# Patient Record
Sex: FEMALE | Race: WHITE | Employment: OTHER | URBAN - METROPOLITAN AREA
[De-identification: names, ages, dates, MRNs, and addresses within clinical notes are randomized per-mention and may not be internally consistent; named-entity substitution may affect disease eponyms.]

---

## 2017-01-16 ENCOUNTER — HOSPITAL ENCOUNTER (OUTPATIENT)
Dept: GENERAL RADIOLOGY | Facility: MEDICAL CENTER | Age: 57
Discharge: HOME | End: 2017-01-16
Attending: ORTHOPAEDIC SURGERY | Admitting: ORTHOPAEDIC SURGERY

## 2017-01-16 ENCOUNTER — OFFICE VISIT (OUTPATIENT)
Dept: ORTHOPEDIC SURGERY | Age: 57
End: 2017-01-16

## 2017-01-16 VITALS
BODY MASS INDEX: 25.83 KG/M2 | HEIGHT: 65 IN | SYSTOLIC BLOOD PRESSURE: 123 MMHG | DIASTOLIC BLOOD PRESSURE: 79 MMHG | WEIGHT: 155 LBS | HEART RATE: 76 BPM

## 2017-01-16 DIAGNOSIS — G89.29 CHRONIC PAIN OF RIGHT KNEE: ICD-10-CM

## 2017-01-16 DIAGNOSIS — M17.12 PRIMARY OSTEOARTHRITIS OF LEFT KNEE: ICD-10-CM

## 2017-01-16 DIAGNOSIS — G89.29 CHRONIC PAIN OF LEFT KNEE: ICD-10-CM

## 2017-01-16 DIAGNOSIS — M25.562 CHRONIC PAIN OF LEFT KNEE: ICD-10-CM

## 2017-01-16 DIAGNOSIS — M25.561 CHRONIC PAIN OF RIGHT KNEE: ICD-10-CM

## 2017-01-16 DIAGNOSIS — M25.562 CHRONIC PAIN OF LEFT KNEE: Primary | ICD-10-CM

## 2017-01-16 DIAGNOSIS — M17.11 PRIMARY OSTEOARTHRITIS OF RIGHT KNEE: ICD-10-CM

## 2017-01-16 DIAGNOSIS — G89.29 CHRONIC PAIN OF LEFT KNEE: Primary | ICD-10-CM

## 2017-01-16 DIAGNOSIS — Z98.890 S/P RIGHT KNEE ARTHROSCOPY: ICD-10-CM

## 2017-01-16 DIAGNOSIS — M22.41 CHONDROMALACIA PATELLAE OF RIGHT KNEE: ICD-10-CM

## 2017-01-16 DIAGNOSIS — M22.42 CHONDROMALACIA PATELLAE OF LEFT KNEE: ICD-10-CM

## 2017-01-16 PROCEDURE — 20610 DRAIN/INJ JOINT/BURSA W/O US: CPT | Performed by: PHYSICIAN ASSISTANT

## 2017-01-16 PROCEDURE — 99214 OFFICE O/P EST MOD 30 MIN: CPT | Performed by: PHYSICIAN ASSISTANT

## 2017-01-16 PROCEDURE — 73562 X-RAY EXAM OF KNEE 3: CPT | Performed by: PHYSICIAN ASSISTANT

## 2017-01-16 RX ORDER — PROPRANOLOL HYDROCHLORIDE 120 MG/1
120 CAPSULE, EXTENDED RELEASE ORAL NIGHTLY
COMMUNITY
End: 2019-04-12

## 2017-02-06 ENCOUNTER — OFFICE VISIT (OUTPATIENT)
Dept: ENDOCRINOLOGY | Age: 57
End: 2017-02-06

## 2017-02-06 VITALS
HEIGHT: 60 IN | HEART RATE: 75 BPM | RESPIRATION RATE: 14 BRPM | SYSTOLIC BLOOD PRESSURE: 113 MMHG | DIASTOLIC BLOOD PRESSURE: 81 MMHG | BODY MASS INDEX: 28.86 KG/M2 | WEIGHT: 147 LBS | OXYGEN SATURATION: 93 %

## 2017-02-06 DIAGNOSIS — E03.9 HYPOTHYROIDISM, UNSPECIFIED TYPE: Primary | ICD-10-CM

## 2017-02-06 PROCEDURE — 99213 OFFICE O/P EST LOW 20 MIN: CPT | Performed by: INTERNAL MEDICINE

## 2017-02-07 ENCOUNTER — HOSPITAL ENCOUNTER (OUTPATIENT)
Dept: GENERAL RADIOLOGY | Age: 57
Discharge: HOME | End: 2017-02-07
Attending: INTERNAL MEDICINE | Admitting: INTERNAL MEDICINE

## 2017-02-07 DIAGNOSIS — E03.9 HYPOTHYROIDISM, UNSPECIFIED TYPE: ICD-10-CM

## 2017-02-07 LAB
A/G RATIO: 1.8 (ref 1.1–2.2)
ALBUMIN SERPL-MCNC: 4.4 G/DL (ref 3.4–5)
ALP BLD-CCNC: 65 U/L (ref 40–129)
ALT SERPL-CCNC: 11 U/L (ref 10–40)
ANION GAP SERPL CALCULATED.3IONS-SCNC: 15 MMOL/L (ref 3–16)
AST SERPL-CCNC: 16 U/L (ref 15–37)
BILIRUB SERPL-MCNC: 0.3 MG/DL (ref 0–1)
BUN BLDV-MCNC: 16 MG/DL (ref 7–20)
CALCIUM SERPL-MCNC: 9.2 MG/DL (ref 8.3–10.6)
CHLORIDE BLD-SCNC: 100 MMOL/L (ref 99–110)
CO2: 22 MMOL/L (ref 21–32)
CREAT SERPL-MCNC: 1 MG/DL (ref 0.6–1.1)
GFR AFRICAN AMERICAN: >60
GFR NON-AFRICAN AMERICAN: 57
GLOBULIN: 2.4 G/DL
GLUCOSE BLD-MCNC: 88 MG/DL (ref 70–99)
HCT VFR BLD CALC: 40.8 % (ref 36–48)
HEMOGLOBIN: 13.6 G/DL (ref 12–16)
MCH RBC QN AUTO: 30 PG (ref 26–34)
MCHC RBC AUTO-ENTMCNC: 33.4 G/DL (ref 31–36)
MCV RBC AUTO: 90.1 FL (ref 80–100)
PDW BLD-RTO: 15.5 % (ref 12.4–15.4)
PLATELET # BLD: 183 K/UL (ref 135–450)
PMV BLD AUTO: 7.1 FL (ref 5–10.5)
POTASSIUM SERPL-SCNC: 4.1 MMOL/L (ref 3.5–5.1)
RBC # BLD: 4.53 M/UL (ref 4–5.2)
SODIUM BLD-SCNC: 137 MMOL/L (ref 136–145)
TOTAL PROTEIN: 6.8 G/DL (ref 6.4–8.2)
TSH SERPL DL<=0.05 MIU/L-ACNC: 5.28 UIU/ML (ref 0.27–4.2)
WBC # BLD: 6.5 K/UL (ref 4–11)

## 2017-02-07 RX ORDER — LEVOTHYROXINE SODIUM 137 UG/1
137 TABLET ORAL DAILY
Qty: 90 TABLET | Refills: 3 | Status: SHIPPED | OUTPATIENT
Start: 2017-02-07 | End: 2018-02-15 | Stop reason: SDUPTHER

## 2017-03-17 ENCOUNTER — OFFICE VISIT (OUTPATIENT)
Dept: ORTHOPEDIC SURGERY | Age: 57
End: 2017-03-17

## 2017-03-17 VITALS
SYSTOLIC BLOOD PRESSURE: 125 MMHG | BODY MASS INDEX: 25.83 KG/M2 | DIASTOLIC BLOOD PRESSURE: 78 MMHG | HEIGHT: 65 IN | WEIGHT: 155 LBS | HEART RATE: 82 BPM

## 2017-03-17 DIAGNOSIS — Z98.890 S/P RIGHT KNEE ARTHROSCOPY: ICD-10-CM

## 2017-03-17 DIAGNOSIS — M22.42 CHONDROMALACIA PATELLAE OF LEFT KNEE: ICD-10-CM

## 2017-03-17 DIAGNOSIS — M25.562 CHRONIC PAIN OF LEFT KNEE: Primary | ICD-10-CM

## 2017-03-17 DIAGNOSIS — M17.12 PRIMARY OSTEOARTHRITIS OF LEFT KNEE: ICD-10-CM

## 2017-03-17 DIAGNOSIS — M25.561 CHRONIC PAIN OF RIGHT KNEE: ICD-10-CM

## 2017-03-17 DIAGNOSIS — M22.41 CHONDROMALACIA PATELLAE OF RIGHT KNEE: ICD-10-CM

## 2017-03-17 DIAGNOSIS — G89.29 CHRONIC PAIN OF LEFT KNEE: Primary | ICD-10-CM

## 2017-03-17 DIAGNOSIS — G89.29 CHRONIC PAIN OF RIGHT KNEE: ICD-10-CM

## 2017-03-17 DIAGNOSIS — M17.11 PRIMARY OSTEOARTHRITIS OF RIGHT KNEE: ICD-10-CM

## 2017-03-17 PROCEDURE — 20610 DRAIN/INJ JOINT/BURSA W/O US: CPT | Performed by: PHYSICIAN ASSISTANT

## 2017-03-17 PROCEDURE — 99213 OFFICE O/P EST LOW 20 MIN: CPT | Performed by: PHYSICIAN ASSISTANT

## 2017-05-05 ENCOUNTER — OFFICE VISIT (OUTPATIENT)
Dept: ORTHOPEDIC SURGERY | Age: 57
End: 2017-05-05

## 2017-05-05 VITALS
SYSTOLIC BLOOD PRESSURE: 131 MMHG | BODY MASS INDEX: 25.83 KG/M2 | DIASTOLIC BLOOD PRESSURE: 77 MMHG | WEIGHT: 155 LBS | HEIGHT: 65 IN | HEART RATE: 68 BPM

## 2017-05-05 DIAGNOSIS — M22.42 CHONDROMALACIA PATELLAE OF LEFT KNEE: Primary | ICD-10-CM

## 2017-05-05 DIAGNOSIS — M17.12 PRIMARY OSTEOARTHRITIS OF LEFT KNEE: ICD-10-CM

## 2017-05-05 DIAGNOSIS — G89.29 CHRONIC PAIN OF RIGHT KNEE: ICD-10-CM

## 2017-05-05 DIAGNOSIS — G89.29 CHRONIC PAIN OF LEFT KNEE: ICD-10-CM

## 2017-05-05 DIAGNOSIS — M25.562 CHRONIC PAIN OF LEFT KNEE: ICD-10-CM

## 2017-05-05 DIAGNOSIS — M25.561 CHRONIC PAIN OF RIGHT KNEE: ICD-10-CM

## 2017-05-05 DIAGNOSIS — Z98.890 S/P RIGHT KNEE ARTHROSCOPY: ICD-10-CM

## 2017-05-05 DIAGNOSIS — M17.11 PRIMARY OSTEOARTHRITIS OF RIGHT KNEE: ICD-10-CM

## 2017-05-05 DIAGNOSIS — M22.41 CHONDROMALACIA PATELLAE OF RIGHT KNEE: ICD-10-CM

## 2017-05-05 PROCEDURE — 20610 DRAIN/INJ JOINT/BURSA W/O US: CPT | Performed by: PHYSICIAN ASSISTANT

## 2017-05-05 PROCEDURE — 99213 OFFICE O/P EST LOW 20 MIN: CPT | Performed by: PHYSICIAN ASSISTANT

## 2017-06-20 ENCOUNTER — TELEPHONE (OUTPATIENT)
Dept: ENDOCRINOLOGY | Age: 57
End: 2017-06-20

## 2017-06-20 RX ORDER — LEVOTHYROXINE SODIUM 0.12 MG/1
TABLET ORAL
Qty: 26 TABLET | Refills: 5 | Status: SHIPPED | OUTPATIENT
Start: 2017-06-20 | End: 2017-06-23

## 2017-06-29 ENCOUNTER — HOSPITAL ENCOUNTER (OUTPATIENT)
Dept: SURGERY | Age: 57
Discharge: HOME | End: 2017-06-29
Attending: INTERNAL MEDICINE | Admitting: INTERNAL MEDICINE

## 2017-06-29 VITALS
RESPIRATION RATE: 14 BRPM | WEIGHT: 162 LBS | SYSTOLIC BLOOD PRESSURE: 117 MMHG | HEART RATE: 73 BPM | TEMPERATURE: 98 F | HEIGHT: 60 IN | OXYGEN SATURATION: 99 % | BODY MASS INDEX: 31.8 KG/M2 | DIASTOLIC BLOOD PRESSURE: 67 MMHG

## 2017-06-29 DIAGNOSIS — R19.7 DIARRHEA: ICD-10-CM

## 2017-06-29 RX ORDER — SODIUM CHLORIDE, SODIUM LACTATE, POTASSIUM CHLORIDE, CALCIUM CHLORIDE 600; 310; 30; 20 MG/100ML; MG/100ML; MG/100ML; MG/100ML
INJECTION, SOLUTION INTRAVENOUS CONTINUOUS
Status: DISCONTINUED | OUTPATIENT
Start: 2017-06-29 | End: 2017-06-30 | Stop reason: HOSPADM

## 2017-06-29 RX ORDER — SODIUM CHLORIDE 0.9 % (FLUSH) 0.9 %
10 SYRINGE (ML) INJECTION EVERY 12 HOURS SCHEDULED
Status: DISCONTINUED | OUTPATIENT
Start: 2017-06-29 | End: 2017-06-30 | Stop reason: HOSPADM

## 2017-06-29 RX ORDER — MEPERIDINE HYDROCHLORIDE 50 MG/ML
12.5 INJECTION INTRAMUSCULAR; INTRAVENOUS; SUBCUTANEOUS EVERY 5 MIN PRN
Status: DISCONTINUED | OUTPATIENT
Start: 2017-06-29 | End: 2017-06-30 | Stop reason: HOSPADM

## 2017-06-29 RX ORDER — LABETALOL HYDROCHLORIDE 5 MG/ML
5 INJECTION, SOLUTION INTRAVENOUS EVERY 10 MIN PRN
Status: DISCONTINUED | OUTPATIENT
Start: 2017-06-29 | End: 2017-06-30 | Stop reason: HOSPADM

## 2017-06-29 RX ORDER — ONDANSETRON 2 MG/ML
4 INJECTION INTRAMUSCULAR; INTRAVENOUS PRN
Status: DISCONTINUED | OUTPATIENT
Start: 2017-06-29 | End: 2017-06-30 | Stop reason: HOSPADM

## 2017-06-29 RX ORDER — PROMETHAZINE HYDROCHLORIDE 25 MG/ML
6.25 INJECTION, SOLUTION INTRAMUSCULAR; INTRAVENOUS
Status: DISCONTINUED | OUTPATIENT
Start: 2017-06-29 | End: 2017-06-30 | Stop reason: HOSPADM

## 2017-06-29 RX ORDER — DIPHENHYDRAMINE HYDROCHLORIDE 50 MG/ML
12.5 INJECTION INTRAMUSCULAR; INTRAVENOUS
Status: ACTIVE | OUTPATIENT
Start: 2017-06-29 | End: 2017-06-29

## 2017-06-29 RX ORDER — MORPHINE SULFATE 2 MG/ML
2 INJECTION, SOLUTION INTRAMUSCULAR; INTRAVENOUS EVERY 5 MIN PRN
Status: DISCONTINUED | OUTPATIENT
Start: 2017-06-29 | End: 2017-06-30 | Stop reason: HOSPADM

## 2017-06-29 RX ORDER — LIDOCAINE HYDROCHLORIDE 10 MG/ML
0.1 INJECTION, SOLUTION INFILTRATION; PERINEURAL ONCE
Status: DISCONTINUED | OUTPATIENT
Start: 2017-06-29 | End: 2017-06-30 | Stop reason: HOSPADM

## 2017-06-29 RX ORDER — SODIUM CHLORIDE 0.9 % (FLUSH) 0.9 %
10 SYRINGE (ML) INJECTION PRN
Status: DISCONTINUED | OUTPATIENT
Start: 2017-06-29 | End: 2017-06-30 | Stop reason: HOSPADM

## 2017-06-29 RX ORDER — HYDRALAZINE HYDROCHLORIDE 20 MG/ML
5 INJECTION INTRAMUSCULAR; INTRAVENOUS EVERY 10 MIN PRN
Status: DISCONTINUED | OUTPATIENT
Start: 2017-06-29 | End: 2017-06-30 | Stop reason: HOSPADM

## 2017-06-29 RX ORDER — LIDOCAINE HYDROCHLORIDE 10 MG/ML
0.3 INJECTION, SOLUTION EPIDURAL; INFILTRATION; INTRACAUDAL; PERINEURAL
Status: ACTIVE | OUTPATIENT
Start: 2017-06-29 | End: 2017-06-29

## 2017-06-29 RX ORDER — MORPHINE SULFATE 2 MG/ML
1 INJECTION, SOLUTION INTRAMUSCULAR; INTRAVENOUS EVERY 5 MIN PRN
Status: DISCONTINUED | OUTPATIENT
Start: 2017-06-29 | End: 2017-06-30 | Stop reason: HOSPADM

## 2017-06-29 RX ORDER — OXYCODONE HYDROCHLORIDE AND ACETAMINOPHEN 5; 325 MG/1; MG/1
1 TABLET ORAL PRN
Status: ACTIVE | OUTPATIENT
Start: 2017-06-29 | End: 2017-06-29

## 2017-06-29 RX ORDER — OXYCODONE HYDROCHLORIDE AND ACETAMINOPHEN 5; 325 MG/1; MG/1
2 TABLET ORAL PRN
Status: ACTIVE | OUTPATIENT
Start: 2017-06-29 | End: 2017-06-29

## 2017-06-29 RX ADMIN — SODIUM CHLORIDE, SODIUM LACTATE, POTASSIUM CHLORIDE, CALCIUM CHLORIDE: 600; 310; 30; 20 INJECTION, SOLUTION INTRAVENOUS at 14:06

## 2017-06-29 ASSESSMENT — PAIN DESCRIPTION - LOCATION: LOCATION: ABDOMEN

## 2017-06-29 ASSESSMENT — PAIN SCALES - GENERAL
PAINLEVEL_OUTOF10: 0

## 2017-06-29 ASSESSMENT — PAIN - FUNCTIONAL ASSESSMENT: PAIN_FUNCTIONAL_ASSESSMENT: 0-10

## 2017-07-14 ENCOUNTER — OFFICE VISIT (OUTPATIENT)
Dept: ORTHOPEDIC SURGERY | Age: 57
End: 2017-07-14

## 2017-07-14 VITALS
HEART RATE: 70 BPM | DIASTOLIC BLOOD PRESSURE: 88 MMHG | HEIGHT: 60 IN | BODY MASS INDEX: 30.63 KG/M2 | WEIGHT: 156 LBS | SYSTOLIC BLOOD PRESSURE: 136 MMHG

## 2017-07-14 DIAGNOSIS — G89.29 CHRONIC PAIN OF RIGHT KNEE: ICD-10-CM

## 2017-07-14 DIAGNOSIS — M25.562 CHRONIC PAIN OF LEFT KNEE: Primary | ICD-10-CM

## 2017-07-14 DIAGNOSIS — M25.561 CHRONIC PAIN OF RIGHT KNEE: ICD-10-CM

## 2017-07-14 DIAGNOSIS — Z98.890 S/P RIGHT KNEE ARTHROSCOPY: ICD-10-CM

## 2017-07-14 DIAGNOSIS — M17.12 PRIMARY OSTEOARTHRITIS OF LEFT KNEE: ICD-10-CM

## 2017-07-14 DIAGNOSIS — M22.42 CHONDROMALACIA PATELLAE OF LEFT KNEE: ICD-10-CM

## 2017-07-14 DIAGNOSIS — M22.41 CHONDROMALACIA PATELLAE OF RIGHT KNEE: ICD-10-CM

## 2017-07-14 DIAGNOSIS — G89.29 CHRONIC PAIN OF LEFT KNEE: Primary | ICD-10-CM

## 2017-07-14 DIAGNOSIS — M17.11 PRIMARY OSTEOARTHRITIS OF RIGHT KNEE: ICD-10-CM

## 2017-07-14 PROCEDURE — 20610 DRAIN/INJ JOINT/BURSA W/O US: CPT | Performed by: PHYSICIAN ASSISTANT

## 2017-08-09 ENCOUNTER — OFFICE VISIT (OUTPATIENT)
Dept: ENDOCRINOLOGY | Age: 57
End: 2017-08-09

## 2017-08-09 ENCOUNTER — HOSPITAL ENCOUNTER (OUTPATIENT)
Dept: GENERAL RADIOLOGY | Age: 57
Discharge: HOME | End: 2017-08-09
Attending: INTERNAL MEDICINE | Admitting: INTERNAL MEDICINE

## 2017-08-09 VITALS
RESPIRATION RATE: 14 BRPM | HEIGHT: 60 IN | SYSTOLIC BLOOD PRESSURE: 121 MMHG | HEART RATE: 69 BPM | WEIGHT: 157 LBS | BODY MASS INDEX: 30.82 KG/M2 | OXYGEN SATURATION: 97 % | DIASTOLIC BLOOD PRESSURE: 83 MMHG

## 2017-08-09 DIAGNOSIS — E03.9 ACQUIRED HYPOTHYROIDISM: Primary | ICD-10-CM

## 2017-08-09 LAB
T4 FREE: 1.5 NG/DL (ref 0.9–1.8)
TSH SERPL DL<=0.05 MIU/L-ACNC: 0.3 UIU/ML (ref 0.27–4.2)

## 2017-08-09 PROCEDURE — 99214 OFFICE O/P EST MOD 30 MIN: CPT | Performed by: INTERNAL MEDICINE

## 2017-08-09 RX ORDER — LEVOTHYROXINE SODIUM 0.12 MG/1
125 TABLET ORAL DAILY
COMMUNITY
End: 2018-01-16

## 2017-08-25 ENCOUNTER — OFFICE VISIT (OUTPATIENT)
Dept: ORTHOPEDIC SURGERY | Age: 57
End: 2017-08-25

## 2017-08-25 VITALS
DIASTOLIC BLOOD PRESSURE: 78 MMHG | SYSTOLIC BLOOD PRESSURE: 113 MMHG | WEIGHT: 156 LBS | HEART RATE: 74 BPM | BODY MASS INDEX: 30.63 KG/M2 | HEIGHT: 60 IN

## 2017-08-25 DIAGNOSIS — M17.11 PRIMARY OSTEOARTHRITIS OF RIGHT KNEE: ICD-10-CM

## 2017-08-25 DIAGNOSIS — G89.29 CHRONIC PAIN OF LEFT KNEE: Primary | ICD-10-CM

## 2017-08-25 DIAGNOSIS — M25.561 CHRONIC PAIN OF RIGHT KNEE: ICD-10-CM

## 2017-08-25 DIAGNOSIS — M22.41 CHONDROMALACIA PATELLAE OF RIGHT KNEE: ICD-10-CM

## 2017-08-25 DIAGNOSIS — M22.42 CHONDROMALACIA PATELLAE OF LEFT KNEE: ICD-10-CM

## 2017-08-25 DIAGNOSIS — M25.562 CHRONIC PAIN OF LEFT KNEE: Primary | ICD-10-CM

## 2017-08-25 DIAGNOSIS — M17.12 PRIMARY OSTEOARTHRITIS OF LEFT KNEE: ICD-10-CM

## 2017-08-25 DIAGNOSIS — G89.29 CHRONIC PAIN OF RIGHT KNEE: ICD-10-CM

## 2017-08-25 DIAGNOSIS — Z98.890 S/P RIGHT KNEE ARTHROSCOPY: ICD-10-CM

## 2017-08-25 PROCEDURE — 20610 DRAIN/INJ JOINT/BURSA W/O US: CPT | Performed by: PHYSICIAN ASSISTANT

## 2017-08-25 PROCEDURE — 99999 PR OFFICE/OUTPT VISIT,PROCEDURE ONLY: CPT | Performed by: PHYSICIAN ASSISTANT

## 2017-08-25 RX ORDER — BUPROPION HYDROCHLORIDE 150 MG/1
150 TABLET ORAL EVERY MORNING
COMMUNITY
End: 2018-01-16

## 2017-10-27 ENCOUNTER — OFFICE VISIT (OUTPATIENT)
Dept: ORTHOPEDIC SURGERY | Age: 57
End: 2017-10-27

## 2017-10-27 VITALS
DIASTOLIC BLOOD PRESSURE: 72 MMHG | BODY MASS INDEX: 30.63 KG/M2 | SYSTOLIC BLOOD PRESSURE: 113 MMHG | HEIGHT: 60 IN | WEIGHT: 156 LBS | HEART RATE: 75 BPM

## 2017-10-27 DIAGNOSIS — M17.11 PRIMARY OSTEOARTHRITIS OF RIGHT KNEE: ICD-10-CM

## 2017-10-27 DIAGNOSIS — M22.42 CHONDROMALACIA PATELLAE OF LEFT KNEE: ICD-10-CM

## 2017-10-27 DIAGNOSIS — M25.561 CHRONIC PAIN OF RIGHT KNEE: ICD-10-CM

## 2017-10-27 DIAGNOSIS — G89.29 CHRONIC PAIN OF RIGHT KNEE: ICD-10-CM

## 2017-10-27 DIAGNOSIS — M17.12 PRIMARY OSTEOARTHRITIS OF LEFT KNEE: ICD-10-CM

## 2017-10-27 DIAGNOSIS — G89.29 CHRONIC PAIN OF LEFT KNEE: Primary | ICD-10-CM

## 2017-10-27 DIAGNOSIS — M25.562 CHRONIC PAIN OF LEFT KNEE: Primary | ICD-10-CM

## 2017-10-27 DIAGNOSIS — M22.41 CHONDROMALACIA PATELLAE OF RIGHT KNEE: ICD-10-CM

## 2017-10-27 DIAGNOSIS — Z98.890 S/P RIGHT KNEE ARTHROSCOPY: ICD-10-CM

## 2017-10-27 PROCEDURE — 20610 DRAIN/INJ JOINT/BURSA W/O US: CPT | Performed by: PHYSICIAN ASSISTANT

## 2017-10-27 NOTE — PATIENT INSTRUCTIONS
Alice Ross was instructed to apply ice to the injection area for 15 - 20 minutes several times a day to decrease pain and and swelling. Ice (\"ICE IS YOUR FRIEND\": try using a bag of frozen peas or corn) for 15  20 minutes 3 x day. Limit activities today. You may resume normal activities tomorrow if you have no pain. For severe pain:  If after hours, she is to go to Emergency Room. During office hours she must come in to the office. Alice Ross was instructed to call the office if there are any questions or concerns related to her condition. I have asked her to schedule a follow-up appointment for 6-8 weeks from now for re-evaluation and possible repeat injection. She is specifically instructed to contact the office between now & her scheduled appointment if she has concerns related to her condition. She is welcome to call for an appointment sooner if she has any additional concerns or questions. General Medication Instructions:  Any prescriptions must be used as directed. If you have any concerns, questions or require refills, please contact our office. If you experience any adverse reactions, stop the medication and call our office immediately. If you designate a preferred pharmacy, appropriate prescriptions will be sent to your preferred pharmacy for pickup to be use as directed. Patient Driving Instructions:  No driving if you are taking narcotic pain medications or muscle relaxers. PATIENT REMINDER:   Carry a list of your medications and allergies with you at all times. Call your pharmacy and our office at least 1 week in advance to refill prescriptions. Narcotic medications will not be refilled after hours or on weekends. ATTENTION    As of October 2, 2014, the [facility] ([facility]) has mandated that ALL PRESCRIPTION PAIN MEDICINE cannot be called into your pharmacy.     This includes:    Oxycodone (Percocet)  Hydrocodone (Vicodin, provided the following link that may be helpful for you if you would like more information on your Orthopaedic condition:    www. Orthoinfo. org         If you or someone you know struggles with weight issues and joint pain, please check out this important documentary:      \"Start Moving Start Living\" =  Http://startmovingstartliving.com       ([employer] video SMSL FULL SD)

## 2017-10-27 NOTE — LETTER
FAXED/SENT TO Dr Jairo Dickson MD  10/27/17, 12:06 PM        Kenan Bucio      Dear Dr Jairo Dickson MD,    Thank you very much for your referral of Ms. Vesta Salazar to me for evaluation and treatment. Attached below is my report and recommendations from Susan Espinosa most recent office visit. I appreciate your confidence in me and thank you for allowing me the opportunity to care for your patients. If I can be of any further assistance to you on this or any other patient, please do not hesitate to contact me. Sincerely,    Lalo Monet PA-C  Electronically signed by Lalo Monet PA-C on 10/27/2017 at 12:06 PM     Contact Information:  Olivia Roach,  &  Clinical Staff  [phone], [phone] [facility] [place]  Orthopaedic Surgery & Sports Medicine       [address] [facility] OFFICE  [address] [place] [address], 2nd Floor  [address] [facility] [place], [address] [place] [address] 30    [phone] Option 3   [phone] Option [phone] (fax)    [phone] (fax)       PATIENT: Vesta Salazar    61 y.o.  female  [facility]: 1960   MRN:  [id number]       Date of current encounter: 10/27/2017  This encounter is evaluated as a:        New Patient Visit     Established Patient Visit    Post-Op Visit      Consult: requested by          Worker's Comp       Patient's PCP is Dr. Jairo Dickson MD    Subjective:     Chief Complaint   Patient presents with    Knee Pain     ongoing evaluation and treatment of bilateral knees        HPI:  Vesta Salazar is a 61y.o. year old,  female complaining of bilateral knee pain. She states that since her last visit her knees have been relatively doing well. She has found a new walking partner and has been walking more.  They did start walking hills which has increased her knee pain. She plans on staying away from walking hills future. She would like an injection into both of her knees today. She rates her pain as a 0 out of 10 at rest and a 5 out of 10 with activity. She describes her pain as sharp in grinding. It occurs for minutes and is intermittent. Kneeling, squatting and prolonged walking aggravate her pain. She does feel that her knees are limiting her behavior some. Rest, ice and Tylenol help to relieve her pain. She does not have night pain. She does not have morning.     PAIN ASSESSMENT:   Pain Assessment  Location of Pain: Knee  Location Modifiers: Right, Left  Severity of Pain: 5  Quality of Pain: Sharp, Grinding  Duration of Pain: A few minutes  Frequency of Pain: Intermittent  Date Pain First Started:  (ongoing several years )  Aggravating Factors: Kneeling, Squatting, Walking  Limiting Behavior: Some  Relieving Factors: Rest, Ice, Other (Comment) (tylenol)  Result of Injury: No  Work-Related Injury: No  Are there other pain locations you wish to document?: No    Patient Active Problem List   Diagnosis    Headache    Hyperlipidemia    Cerebral artery occlusion with cerebral infarction ([address]    GERD (gastroesophageal reflux disease)    Hypothyroidism    Anxiety    Depression    H/O suicide attempt    Cerebral infarction ([address]    Disc disorder of cervical region    Fibromyalgia    Generalized osteoarthritis    Irritable bowel syndrome    Impingement syndrome of shoulder region    Patent foramen ovale    Spondyloarthropathy (HCC)    Menopause    History of total vaginal hysterectomy    Vaginal atrophy    S/P right knee arthroscopy, in 2012 by Dr. Markos Dueñas Primary osteoarthritis of left knee    Chondromalacia patellae of left knee    Decreased libido    Chondromalacia patellae of right knee    Primary osteoarthritis of right knee    Tear of medial meniscus of right knee    Knee effusion    Right knee pain    Left knee pain  Chronic pain of right knee    Chronic pain of left knee    Dyspareunia in female     Past Medical History:   Diagnosis Date    Anticoagulant long-term use     Anxiety     Arthritis     Bipolar disorder (HCC)     Cerebrovascular disease 2/2014    stroke    Depression     Fibromyalgia     Generalized osteoarthritis 3/6/2015    GERD (gastroesophageal reflux disease)     H/O suicide attempt     Headache(784.0)     Hyperlipidemia     Hypertension     Hypothyroidism     Irritable bowel syndrome     Obesity     Patent foramen ovale 2/27/2014    Unspecified cerebral artery occlusion with cerebral infarction      Past Surgical History:   Procedure Laterality Date    CHOLECYSTECTOMY  1990's    laparoscopic    COLONOSCOPY  2014    COLONOSCOPY  06/29/2017    random biopsies    HYSTERECTOMY  1980's    total vaginal hysterectomy, retains both ovaries, menorrhagia, fibroid tumors    KNEE CARTILAGE SURGERY Right     THYROIDECTOMY         Allergies:  Latex; Codeine; Ketorolac; Ketorolac tromethamine; Kiwi extract; Nsaids; Penicillins; and Tolmetin    Medications:  Prior to Visit Medications    Medication Sig Taking?  Authorizing Provider   buPROPion (WELLBUTRIN XL) 150 MG extended release tablet Take 150 mg by mouth every morning  Historical Provider, MD   levothyroxine (SYNTHROID) 125 MCG tablet Take 125 mcg by mouth Daily Take one tablet on Saturday and Sunday  Historical Provider, MD   lamoTRIgine (LAMICTAL) 150 MG tablet Take 150 mg by mouth daily  Historical Provider, MD   levothyroxine (SYNTHROID) 137 MCG tablet Take 1 tablet by mouth Daily  Patient taking differently: Take 137 mcg by mouth Daily Take one tablet Monday -Friday  Anupam Ruiz MD   propranolol (INDERAL LA) 120 MG extended release capsule Take 120 mg by mouth  Historical Provider, MD   DULoxetine (CYMBALTA) 60 MG capsule Take 2 capsules by mouth daily  Selma Basilio MD Cholecalciferol (VITAMIN D) 2000 UNITS CAPS capsule Take 1 capsule by mouth daily  Historical Provider, MD   clonazePAM (KLONOPIN) 0.5 MG tablet Take 1 tablet by mouth 2 times daily as needed  Haseeb Meza MD   omeprazole (PRILOSEC) 40 MG capsule TAKE 1 CAPSULE BY MOUTH DAILY  Haseeb Meza MD   topiramate (TOPAMAX) 100 MG tablet TAKE 1 TABLET BY MOUTH TWICE DAILY  Haseeb Meza MD   clopidogrel (PLAVIX) 75 MG tablet   Historical Provider, MD   fluticasone (FLONASE) 50 MCG/ACT nasal spray 2 sprays up each nostril daily. Haseeb Meza MD     Social History     Social History    Marital status:      Spouse name: N/A    Number of children: N/A    Years of education: N/A     Occupational History    retired      Social History Main Topics    Smoking status: Never Smoker    Smokeless tobacco: Never Used    Alcohol use 1.2 oz/week     2 Glasses of wine per week      Comment: socially    Drug use: No    Sexual activity: Not Currently     Birth control/ protection: Post-menopausal     Other Topics Concern    Not on file     Social History Narrative    No narrative on file     Family History   Problem Relation Age of Onset    Clotting Disorder Mother     Other Mother      lung disease    Cancer Father      colon, brain    Arthritis Father      polio    Heart Disease Father     Heart Disease Brother     Obesity Brother     Substance Abuse Brother        Review of Systems (ROS):    Performed. Rogerio Whipple's review of systems has been performed by intake and observation. The most recent ROS was scanned into the media tab of the chart on 7/14/2017. She has been instructed to contact her primary care physician regarding ROS issues if not already being addressed at this time. There are no recent changes.      Objective:   Physical Exam  Vital Signs:  /72   Pulse 75   Ht 5' (1.524 m)   Wt 156 lb (70.8 kg)   BMI 30.47 kg/m²      Constitution: Generally, Krystian Regalado is  alert,  appears stated age, and  in no distress. Her general body habitus is  Cachectic   Thin   Normal   Obese   Morbidly Obese    Head:  Normocephalic  Eyes:  Extra-occular muscles intact     Wears glasses  Left Ear:  External Ear normal   Right Ear:  External Ear normal   Nose:  Normal  Mouth:  Oral mucosa moist   No perioral lesions    Pulmonary:  Respirations unlabored and regular  Skin:  Warm  Well perfused     Psychiatric:    Good judgement and insight   Oriented to  person,  place, and  time. Mood appropriate for circumstances. Gait:   Gait is  Normal   Impaired slight limp  Assistive Device:  None   Knee Brace   Cane   Crutches    Walker    Wheelchair   Other     ORTHOPAEDIC KNEE EXAM: BILATERAL   Inspection:   Skin intact without abrasion or lacerations. Ecchymosis:   none   mild   moderate   severe   Atrophy:   none   mild   moderate   severe   Effusion:  none   mild   moderate   severe   Scar / Surgical incision(s): A-Scope Portals   Open Surgical Incision(s)    Alignment:   Normal   Varus  Valgus    Range of Motion:   Normal Knee ROM          Deferred: acute injury/post-surgery/pain    Limited ROM: 0-90° with crepitation    Palpation:    No Tenderness   Tenderness: Anterior and medial joint line, left greater than right  mild   moderate   severe    Patellofemoral Crepitation:   none   mild   moderate   severe    Motor Function:    No gross motor weakness   Motor weakness:   mild   moderate   severe     Neurologic:   Sensation to light touch intact    Coordination / proprioception intact    Circulation:   The limb is warm and well perfused   Capillary refill is intact.    Edema   none   mild   moderate   severe   Venous stasis changes   none   mild   moderate   severe    Bilateral Hip Exam:   Negative logroll     Data Reviewed:      No imaging studies were obtained today.      XRays:  (3 views: AP, lateral and patella views) of her right knee taken 1/16/17 showed mild degenerative changes in the patellofemoral compartment, moderate degenerative changes in the medial compartment and mild degenerative changes in the lateral compartment. There is joint space narrowing and osteophyte formation patellofemoral and medial compartment. There is neutral alignment.       (3 views: AP, lateral and patella views) of her left knee taken 1/16/17 showed very mild degenerative changes in the patellofemoral compartment and mild degenerative changes in the medial compartment. There is joint space narrowing and osteophyte formation medial compartment. There is neurtral alignment. PROCEDURE NOTE: BILATERAL KNEE INJECTIONS  10/27/2017 at 11:59 AM   Procedure: Injections  Verbal consent was obtained. Risks and benefits were explained. Questions were encouraged and answered. Timeout Verification Completed including:    Correct patient: Fiordaliza Yousif was identified    Correct procedure    Correct site & side    Correct equipment and supplies    Staff member: Deja Dewey PA-C     Assistant: Willie George     Injection Site: Superolateral  bilaterally    The injection sites were prepped with Chlora-Prep using aseptic technique and bilateral knee intra-articular injections were performed with ethyl chloride for anesthetic. Depomedrol 2 ml (40 mg/ml = 80 mg total) was injected into each knee. A sterile adhesive dressing was applied to each injection site. Post procedure:  Fiordaliza Yousif tolerated the treatment well. Instructions to patient:  Appropriate post injections instructions were given to Fiordaliza Yousif. Assessment (Medical Decision Making):     Fiordaliza Yousif is a 61y.o. year old female with the following diagnosis:    1. Chronic pain of left knee  DE ARTHROCENTESIS ASPIR&/INJ MAJOR JT/BURSA W/O US    DE METHYLPREDNISOLONE 40 MG INJ   2.  Primary osteoarthritis of left knee  DE ARTHROCENTESIS ASPIR&/INJ MAJOR JT/BURSA W/O US    DE METHYLPREDNISOLONE 40 MG INJ 3. Chondromalacia patellae of left knee  [place] ARTHROCENTESIS ASPIR&/INJ MAJOR JT/BURSA W/O US    [place] METHYLPREDNISOLONE 40 MG INJ   4. Chronic pain of right knee  [place] ARTHROCENTESIS ASPIR&/INJ MAJOR JT/BURSA W/O US    [place] METHYLPREDNISOLONE 40 MG INJ   5. Primary osteoarthritis of right knee  [place] ARTHROCENTESIS ASPIR&/INJ MAJOR JT/BURSA W/O US    [place] METHYLPREDNISOLONE 40 MG INJ   6. Chondromalacia patellae of right knee  [place] ARTHROCENTESIS ASPIR&/INJ MAJOR JT/BURSA W/O US    [place] METHYLPREDNISOLONE 40 MG INJ   7. S/P right knee arthroscopy, in 2012 by Dr. Laura Lindsay         Her overall course:         Responding adequately to ongoing treatment      Worsening despite conservative treatment      Unchanged despite conservative treatment    Plan (Medical Decision Making):      I discussed the diagnosis and the treatment options with Fiordaliza Yousif today. In Summary:  1). The various treatment options were outlined and discussed with Fiordaliza Yousif including:       Conservative care options:      physical therapy, ice, NSAIDs, bracing, and activity modification        The indications for therapeutic injections Steroids and Hyluronic Acid/Synvisc/Euflexxa/Supartz    2). After considering the various options discussed, Fiordaliza Yousif elected to pursue a course of treatment that includes the following:       MEDICATIONS/REFILLS prescribed today are listed below. No refills today. Physical Therapy/HEP Activities as tolerated        Script: 2 x per week x 4 weeks     Ice to affected area: 15-20 minutes 4 x day     Injection into both of her knees today     Return to Clinic/Follow - Up:  Fiordaliza Yousif was asked to make a follow-up appointment:     6-8 weeks if needed    Fiordaliza Yousif was instructed to call the office if her symptoms worsen or if new symptoms appear prior to the next scheduled visit.  She is specifically instructed to contact the office between now & her scheduled appointment if she has concerns related to her condition or if she needs assistance in scheduling the above tests. She is welcome to call for an appointment sooner if she has any additional concerns or questions. Patient Education Materials Provided:    Patient Instructions     Hali Pak was instructed to apply ice to the injection area for 15 - 20 minutes several times a day to decrease pain and and swelling. Ice (\"ICE IS YOUR FRIEND\": try using a bag of frozen peas or corn) for 15  20 minutes 3 x day. Limit activities today. You may resume normal activities tomorrow if you have no pain. For severe pain:  If after hours, she is to go to Emergency Room. During office hours she must come in to the office. Hali Pak was instructed to call the office if there are any questions or concerns related to her condition. I have asked her to schedule a follow-up appointment for 6-8 weeks from now for re-evaluation and possible repeat injection. She is specifically instructed to contact the office between now & her scheduled appointment if she has concerns related to her condition. She is welcome to call for an appointment sooner if she has any additional concerns or questions. General Medication Instructions:  Any prescriptions must be used as directed. If you have any concerns, questions or require refills, please contact our office. If you experience any adverse reactions, stop the medication and call our office immediately. If you designate a preferred pharmacy, appropriate prescriptions will be sent to your preferred pharmacy for pickup to be use as directed. Patient Driving Instructions:  No driving if you are taking narcotic pain medications or muscle relaxers. PATIENT REMINDER:   Carry a list of your medications and allergies with you at all times.   Call your pharmacy and our office at least 1 week in advance to refill Dispose of any unused medications properly. Do not flush the medications. Look for appropriate waste collection programs in your community and drug take back events. DO NOT drive while taking narcotic pain medication or muscle relaxers. FOR MORE INFORMATION, CHECK OUT THIS RESOURCE    For your convenience, Dr. Juliane Tineo has provided the following link that may be helpful for you if you would like more information on your Orthopaedic condition:    www. Orthoinfo. org         If you or someone you know struggles with weight issues and joint pain, please check out this important documentary:      \"Start Moving Start Living\" =  Http://[email or web address]       (YOUTUBE video SMSL FULL SD)                No orders of the defined types were placed in this encounter. Refills/New Prescriptions:  No orders of the defined types were placed in this encounter. Today's prescription medications will be e-scribed (when appropriate) to the Patient's Preferred Pharmacy:   [id number] [place], [address] [place] [address] [address]  [address]  Phone: [phone] Fax: [phone] [address] [facility], [address] [place] [address]  Phone: [phone] Fax: [phone]       Eh Taylor PA-C  Electronically signed by Eh Taylor PA-C on 10/27/2017 at 11:59 AM     Contact Information:  Caryl Waldrop, [address] [facility] Staff  [phone], [address]    This dictation was performed with a verbal recognition program (DRAGON) and it was checked for errors. It is possible that there are still dictated errors within this office note. If so, please bring any errors to my attention for an addendum. All efforts were made to ensure that this office note is accurate.      I have personally performed and/or participated in the history, physical exam and medical decision making and reviewed all pertinent clinical information unless otherwise noted.

## 2017-10-27 NOTE — PROGRESS NOTES
DEPO-MEDROL CORTISONE INJECTION  NDC# [address]-99  LOT# [id number]  EXP.  DATE: 4/2020  SITE: Bilateral knee

## 2017-10-27 NOTE — PROGRESS NOTES
Rodríguez Stahl PA-C  Orthopaedic Surgery & Sports Medicine      [x] [address] [facility]   [] [facility] OFF[facility]  [address]  [address] [address], [address] [address] [address]    [phone] Option 3   [phone] Option [phone] (fax)    [phone] (fax)       PATIENT: Jonathon Silver    61 y.o.  female  [facility] [place]: 1960   MRN:  [id number]       Date of current encounter: 10/27/2017  This encounter is evaluated as a:       [] New Patient Visit    [x] Established Patient Visit   [] Post-Op Visit     [] Consult: requested by         [] Worker's Comp       Patient's PCP is Dr. Shasha Castro MD    Subjective:     Chief Complaint   Patient presents with    Knee Pain     ongoing evaluation and treatment of bilateral knees        HPI:  Jonathon Silver is a 61y.o. year old,  female complaining of bilateral knee pain. She states that since her last visit her knees have been relatively doing well. She has found a new walking partner and has been walking more. They did start walking hills which has increased her knee pain. She plans on staying away from walking hills future. She would like an injection into both of her knees today. She rates her pain as a 0 out of 10 at rest and a 5 out of 10 with activity. She describes her pain as sharp in grinding. It occurs for minutes and is intermittent. Kneeling, squatting and prolonged walking aggravate her pain. She does feel that her knees are limiting her behavior some. Rest, ice and Tylenol help to relieve her pain. She does not have night pain. She does not have morning.     PAIN ASSESSMENT:   Pain Assessment  Location of Pain: Knee  Location Modifiers: Right, Left  Severity of Pain: 5  Quality of Pain: Sharp, Grinding  Duration of Pain: A few minutes  Frequency of Pain: Intermittent  Date Pain First Started:  (ongoing several years )  Aggravating Factors: Kneeling, Squatting, Walking  Limiting Behavior: Some  Relieving Factors: Rest, Ice, Other (Comment) (tylenol)  Result of Injury: No  Work-Related Injury: No  Are there other pain locations you wish to document?: No    Patient Active Problem List   Diagnosis    Headache    Hyperlipidemia    Cerebral artery occlusion with cerebral infarction ([address]    GERD (gastroesophageal reflux disease)    Hypothyroidism    Anxiety    Depression    H/O suicide attempt    Cerebral infarction ([address]    Disc disorder of cervical region    Fibromyalgia    Generalized osteoarthritis    Irritable bowel syndrome    Impingement syndrome of shoulder region    Patent foramen ovale    Spondyloarthropathy (HCC)    Menopause    History of total vaginal hysterectomy    Vaginal atrophy    S/P right knee arthroscopy, in 2012 by Dr. Elroy Mckenzie Primary osteoarthritis of left knee    Chondromalacia patellae of left knee    Decreased libido    Chondromalacia patellae of right knee    Primary osteoarthritis of right knee    Tear of medial meniscus of right knee    Knee effusion    Right knee pain    Left knee pain    Chronic pain of right knee    Chronic pain of left knee    Dyspareunia in female     Past Medical History:   Diagnosis Date    Anticoagulant long-term use     Anxiety     Arthritis     Bipolar disorder (HCC)     Cerebrovascular disease 2/2014    stroke    Depression     Fibromyalgia     Generalized osteoarthritis 3/6/2015    GERD (gastroesophageal reflux disease)     H/O suicide attempt     Headache(784.0)     Hyperlipidemia     Hypertension     Hypothyroidism     Irritable bowel syndrome     Obesity     Patent foramen ovale 2/27/2014    Unspecified cerebral artery occlusion with cerebral infarction      Past Surgical History:   Procedure Laterality Date    CHOLECYSTECTOMY  1990's    laparoscopic    COLONOSCOPY  2014    COLONOSCOPY  06/29/2017    random biopsies    HYSTERECTOMY  1980's    total vaginal hysterectomy, retains moderate  [] severe  [x] Atrophy:  [x] none  [] mild  [] moderate  [] severe  [x] Effusion: [x] none  [] mild  [] moderate  [] severe  [] Scar / Surgical incision(s): [] A-Scope Portals  [] Open Surgical Incision(s)    Alignment:  [x] Normal  [] Varus [] Valgus    Range of Motion:  [] Normal Knee ROM         [] Deferred: acute injury/post-surgery/pain   [x] Limited ROM: 0-90° with crepitation    Palpation:   [] No Tenderness  [x] Tenderness: Anterior and medial joint line, left greater than right [x] mild  [] moderate  [] severe   [x] Patellofemoral Crepitation:  [] none  [x] mild  [] moderate  [] severe    Motor Function:   [x] No gross motor weakness  [] Motor weakness:  [] mild  [] moderate  [] severe     Neurologic:  [x] Sensation to light touch intact   [x] Coordination / proprioception intact    Circulation:  [x] The limb is warm and well perfused  [x] Capillary refill is intact. [x] Edema  [x] none  [] mild  [] moderate  [] severe  [x] Venous stasis changes  [x] none  [] mild  [] moderate  [] severe    Bilateral Hip Exam:  [x] Negative logroll     Data Reviewed:      No imaging studies were obtained today.      XRays:  (3 views: AP, lateral and patella views) of her right knee taken 1/16/17 showed mild degenerative changes in the patellofemoral compartment, moderate degenerative changes in the medial compartment and mild degenerative changes in the lateral compartment. There is joint space narrowing and osteophyte formation patellofemoral and medial compartment. There is neutral alignment.       (3 views: AP, lateral and patella views) of her left knee taken 1/16/17 showed very mild degenerative changes in the patellofemoral compartment and mild degenerative changes in the medial compartment. There is joint space narrowing and osteophyte formation medial compartment. There is neurtral alignment.           PROCEDURE NOTE: BILATERAL KNEE INJECTIONS  10/27/2017 at 11:59 AM   Procedure: Injections  Verbal consent was obtained. Risks and benefits were explained. Questions were encouraged and answered. Timeout Verification Completed including:    Correct patient: Abdirahman Ray was identified    Correct procedure    Correct site & side    Correct equipment and supplies    Staff member: Darnell Goldberg PA-C     Assistant: Herman Parrish     Injection Site: Superolateral  bilaterally    The injection sites were prepped with Chlora-Prep using aseptic technique and bilateral knee intra-articular injections were performed with ethyl chloride for anesthetic. Depomedrol 2 ml (40 mg/ml = 80 mg total) was injected into each knee. A sterile adhesive dressing was applied to each injection site. Post procedure:  Abdirahman Ray tolerated the treatment well. Instructions to patient:  Appropriate post injections instructions were given to Abdirahman Ray. Assessment (Medical Decision Making):     Abdirahman Ray is a 61y.o. year old female with the following diagnosis:    1. Chronic pain of left knee  [place] ARTHROCENTESIS ASPIR&/INJ MAJOR JT/BURSA W/O US    [place] METHYLPREDNISOLONE 40 MG INJ   2. Primary osteoarthritis of left knee  [place] ARTHROCENTESIS ASPIR&/INJ MAJOR JT/BURSA W/O US    [place] METHYLPREDNISOLONE 40 MG INJ   3. Chondromalacia patellae of left knee  [place] ARTHROCENTESIS ASPIR&/INJ MAJOR JT/BURSA W/O US    [place] METHYLPREDNISOLONE 40 MG INJ   4. Chronic pain of right knee  [place] ARTHROCENTESIS ASPIR&/INJ MAJOR JT/BURSA W/O US    [place] METHYLPREDNISOLONE 40 MG INJ   5. Primary osteoarthritis of right knee  [place] ARTHROCENTESIS ASPIR&/INJ MAJOR JT/BURSA W/O US    [place] METHYLPREDNISOLONE 40 MG INJ   6. Chondromalacia patellae of right knee  [place] ARTHROCENTESIS ASPIR&/INJ MAJOR JT/BURSA W/O US    [place] METHYLPREDNISOLONE 40 MG INJ   7.  S/P right knee arthroscopy, in 2012 by Dr. Soledad Hays         Her overall course:       [x]  Responding adequately to ongoing treatment    []  Worsening despite conservative treatment    []  Unchanged despite conservative treatment    Plan (Medical Decision Making):      I discussed the diagnosis and the treatment options with Serenity Elias today. In Summary:  1). The various treatment options were outlined and discussed with Serenity Elias including:      [x] Conservative care options:      physical therapy, ice, NSAIDs, bracing, and activity modification       [x] The indications for therapeutic injections Steroids and Hyluronic Acid/Synvisc/Euflexxa/Supartz    2). After considering the various options discussed, Serenity Elias elected to pursue a course of treatment that includes the following:      [x] MEDICATIONS/REFILLS prescribed today are listed below. No refills today. [x] Physical Therapy/HEP Activities as tolerated       [] Script: 2 x per week x 4 weeks    [x] Ice to affected area: 15-20 minutes 4 x day    [x] Injection into both of her knees today     Return to Clinic/Follow - Up:  Serenity Elias was asked to make a follow-up appointment:    [x] 6-8 weeks if needed    Serenity Elias was instructed to call the office if her symptoms worsen or if new symptoms appear prior to the next scheduled visit. She is specifically instructed to contact the office between now & her scheduled appointment if she has concerns related to her condition or if she needs assistance in scheduling the above tests. She is welcome to call for an appointment sooner if she has any additional concerns or questions. Patient Education Materials Provided:    Patient Instructions     Serenity Elias was instructed to apply ice to the injection area for 15 - 20 minutes several times a day to decrease pain and and swelling. Ice (\"ICE IS YOUR FRIEND\": try using a bag of frozen peas or corn) for 15  20 minutes 3 x day. Limit activities today. You may resume normal activities tomorrow if you have no pain. For severe pain:  If after hours, she is to go to Emergency Room. During office hours she must come in to the office.   Serenity Elias was   [phone], Option 3         IMPORTANT NARCOTIC INFORMATION: PLEASE READ     [x] DO NOT share your prescription medication with anyone! Sharing is illegal.  The prescription dose is based on your age, weight, and health problems. Sharing your narcotic prescription can lead to accidental death of the individual for which the prescription was not prescribed. You may not know about his/her addiction problem. [x] Always use the same pharmacy when filling your narcotic prescriptions. [x] DO NOT mix your narcotic prescription with alcohol. Mixing the narcotic prescription medication with alcohol causes depressive effects including breathing problems, organ malfunction, and cardiac arrest.     [x] Always keep your narcotic medication in a locked, secured location. Keep your medication private. This is to avoid individuals from taking your medication without your knowledge. This medication is highly sought after and locking your prescriptions will protect you from being a target of crime. [x] DO NOT stock pile your medication. This also will protect you from being a target of crime. [x] Dispose of any unused medications properly. Do not flush the medications. Look for appropriate waste collection programs in your community and drug take back events. [x] DO NOT drive while taking narcotic pain medication or muscle relaxers. FOR MORE INFORMATION, CHECK OUT THIS RESOURCE    For your convenience, Dr. Patricia Burch has provided the following link that may be helpful for you if you would like more information on your Orthopaedic condition:    www. Orthoinfo. org         If you or someone you know struggles with weight issues and joint pain, please check out this important documentary:      \"Start Moving Start Living\" =  Http://[employer]NameMedia       (YOUTUBE video SMSL FULL SD)                No orders of the defined types were placed in this encounter.       Refills/New Prescriptions:  No orders of the defined types were placed in this encounter. Today's prescription medications will be e-scribed (when appropriate) to the Patient's Preferred Pharmacy:   [facility] [address] [place] [address]  Ayleen Germain  Phone: [phone] Fax: [phone] [address] [facility] [address], [address] [address]  Phone: [phone] Fax: [phone]       Deja Dewey PA-C  Electronically signed by Deja Dewey PA-C on 10/27/2017 at 11:59 AM     Contact Information:  Leonides Mobley, [facility] Staff  [phone], Option 3    This dictation was performed with a verbal recognition program (DRAGON) and it was checked for errors. It is possible that there are still dictated errors within this office note. If so, please bring any errors to my attention for an addendum. All efforts were made to ensure that this office note is accurate. I have personally performed and/or participated in the history, physical exam and medical decision making and reviewed all pertinent clinical information unless otherwise noted.

## 2017-12-06 ENCOUNTER — OFFICE VISIT (OUTPATIENT)
Dept: ENDOCRINOLOGY | Age: 57
End: 2017-12-06

## 2017-12-06 ENCOUNTER — HOSPITAL ENCOUNTER (OUTPATIENT)
Dept: GENERAL RADIOLOGY | Age: 57
Discharge: HOME | End: 2017-12-06
Attending: INTERNAL MEDICINE | Admitting: INTERNAL MEDICINE

## 2017-12-06 VITALS
RESPIRATION RATE: 14 BRPM | WEIGHT: 160.6 LBS | BODY MASS INDEX: 31.53 KG/M2 | SYSTOLIC BLOOD PRESSURE: 114 MMHG | HEART RATE: 72 BPM | HEIGHT: 60 IN | DIASTOLIC BLOOD PRESSURE: 81 MMHG | OXYGEN SATURATION: 99 %

## 2017-12-06 DIAGNOSIS — E03.9 ACQUIRED HYPOTHYROIDISM: ICD-10-CM

## 2017-12-06 DIAGNOSIS — E03.9 ACQUIRED HYPOTHYROIDISM: Primary | ICD-10-CM

## 2017-12-06 LAB
T4 FREE: 1.4 NG/DL (ref 0.9–1.8)
TSH SERPL DL<=0.05 MIU/L-ACNC: 3.15 UIU/ML (ref 0.27–4.2)

## 2017-12-06 PROCEDURE — 99213 OFFICE O/P EST LOW 20 MIN: CPT | Performed by: INTERNAL MEDICINE

## 2017-12-06 RX ORDER — DIVALPROEX SODIUM 125 MG/1
125 CAPSULE, COATED PELLETS ORAL DAILY
COMMUNITY
End: 2018-10-19 | Stop reason: ALTCHOICE

## 2017-12-06 NOTE — PROGRESS NOTES
Endocrinology  Suzanne Franz M.D. Phone: [phone]   FAX: [phone]       Vesta Salazar   YOB: 1960    Date of Visit:  12/6/2017    Allergies   Allergen Reactions    Latex Other (See Comments)     Turns skin really red    Codeine Hives    Ketorolac     Ketorolac Tromethamine     Kiwi Extract Itching    Nsaids Other (See Comments)     \"tear up my stomach\"    Penicillins Hives    Tolmetin      Outpatient Prescriptions Marked as Taking for the 12/6/17 encounter (Office Visit) with Sarah Marshall MD   Medication Sig Dispense Refill    divalproex (DEPAKOTE SPRINKLES) 125 MG capsule Take 125 mg by mouth 2 times daily      buPROPion (WELLBUTRIN XL) 150 MG extended release tablet Take 150 mg by mouth every morning      levothyroxine (SYNTHROID) 125 MCG tablet Take 125 mcg by mouth Daily Take one tablet on Saturday and Sunday      lamoTRIgine (LAMICTAL) 150 MG tablet Take 150 mg by mouth daily      levothyroxine (SYNTHROID) 137 MCG tablet Take 1 tablet by mouth Daily (Patient taking differently: Take 137 mcg by mouth Daily Take one tablet Monday -Friday) 90 tablet 3    propranolol (INDERAL LA) 120 MG extended release capsule Take 120 mg by mouth      DULoxetine (CYMBALTA) 60 MG capsule Take 2 capsules by mouth daily 120 capsule 3    Cholecalciferol (VITAMIN D) 2000 UNITS CAPS capsule Take 1 capsule by mouth daily      clonazePAM (KLONOPIN) 0.5 MG tablet Take 1 tablet by mouth 2 times daily as needed 60 tablet 0    omeprazole (PRILOSEC) 40 MG capsule TAKE 1 CAPSULE BY MOUTH DAILY 90 capsule 3    topiramate (TOPAMAX) 100 MG tablet TAKE 1 TABLET BY MOUTH TWICE DAILY 180 tablet 1    clopidogrel (PLAVIX) 75 MG tablet   6    fluticasone (FLONASE) 50 MCG/ACT nasal spray 2 sprays up each nostril daily.  3 Bottle 3         Vitals:    12/06/17 1026   BP: 114/81   Site: Left Arm   Position: Sitting   Cuff Size: Medium Adult   Pulse: 72   Resp: 14   SpO2: 99%   Weight: 160 lb 9.6 oz (72.8 kg)   Height: 5' (1.524 m)     Body mass index is 31.37 kg/m². Wt Readings from Last 3 Encounters:   12/06/17 160 lb 9.6 oz (72.8 kg)   10/27/17 156 lb (70.8 kg)   08/25/17 156 lb (70.8 kg)     BP Readings from Last 3 Encounters:   12/06/17 114/81   10/27/17 113/72   08/25/17 113/78        Past Medical History:   Diagnosis Date    Anticoagulant long-term use     Anxiety     Arthritis     Bipolar disorder (HCC)     Cerebrovascular disease 2/2014    stroke    Depression     Fibromyalgia     Generalized osteoarthritis 3/6/2015    GERD (gastroesophageal reflux disease)     H/O suicide attempt     Headache(784.0)     Hyperlipidemia     Hypertension     Hypothyroidism     Irritable bowel syndrome     Obesity     Patent foramen ovale 2/27/2014    Unspecified cerebral artery occlusion with cerebral infarction      Past Surgical History:   Procedure Laterality Date    CHOLECYSTECTOMY  1990's    laparoscopic    COLONOSCOPY  2014    COLONOSCOPY  06/29/2017    random biopsies    HYSTERECTOMY  1980's    total vaginal hysterectomy, retains both ovaries, menorrhagia, fibroid tumors    KNEE CARTILAGE SURGERY Right     THYROIDECTOMY       Family History   Problem Relation Age of Onset    Clotting Disorder Mother     Other Mother      lung disease    Cancer Father      colon, brain    Arthritis Father      polio    Heart Disease Father     Heart Disease Brother     Obesity Brother     Substance Abuse Brother      History   Smoking Status    Never Smoker   Smokeless Tobacco    Never Used      History   Alcohol Use    1.2 oz/week    2 Glasses of wine per week     Comment: akiko Coffman is a 61 y.o. female who is here for a follow-up visit for management of thyroid disease. PCP   Maximus Stringer MD     She has a PMH of hypertension, hyperlipidemia, GERD, anxiety, depression, fibromyalgia,obesity, OA, Stroke. Diagnosed with Hypothyroidism in 2005.    She had thyroidectomy for goiter and thyroid nodule in 2005. It was benign on path as per patient. Did not tolerate [place] thyroid. She is having diarrhea. GI is doing work-up. Current regimen : Levothyroxine 137 mcg M-F and  125 mcg on Saturday and Sunday  Takes it first thing in the morning on empty stomach- yes  Interfering medications including calcium, vitamin D , iron tablets, Proton pump inhibitors: no      FH of hypothyroidism: no  FH of thyroid cancer: no      She c/o fatigue, weight gain. C/o dry skin , hair loss. Review of Systems - Negative except flushing, fatigue, dry skin , lips. Physical Exam   Constitutional: She is oriented to person, place, and time. She appears well-developed. No distress. HENT:   Mouth/Throat: Oropharynx is clear and moist.   Eyes: EOM are normal.   Neck: No thyromegaly (Thyroid surgically absent) present. Cardiovascular: Normal rate and normal heart sounds. Pulmonary/Chest: Effort normal. No respiratory distress. She has no wheezes. Abdominal: Soft. Bowel sounds are normal. There is no tenderness. Musculoskeletal: She exhibits no edema. Neurological: She is alert and oriented to person, place, and time. Skin: Skin is warm and dry. She is not diaphoretic. Psychiatric: Her behavior is normal. Thought content normal.      No visits with results within 3 Month(s) from this visit. Latest known visit with results is:   Hospital Outpatient Visit on 08/09/2017   Component Date Value Ref Range Status    TSH 08/09/2017 0.30  0.27 - 4.20 uIU/mL Final    T4 Free 08/09/2017 1.5  0.9 - 1.8 ng/dL Final           Assessment/Plan    1. 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 She was having diarrhea. Labs by her GI showed a TSH of 0.316 in 06/17. Dose was adjusted. On  Levothyroxine 137 mcg M-F and  125 mcg on Saturday and Sunday. She is feeling more tired and fatigued. Has dry skin.      Continues to have diarrhea. Repeat labs today. 2. HTN/HLP/GERD as per Dr. Mena Vaca. 3. Stroke Follows with neurology at [facility]. On plavix. Had temporal lobe biopsy. Was on steroids. Now on depakote. 4. OA Follows with ortho. 5. Chronic Diarrhea.  As per GI

## 2017-12-15 ENCOUNTER — OFFICE VISIT (OUTPATIENT)
Dept: ORTHOPEDIC SURGERY | Age: 57
End: 2017-12-15

## 2017-12-15 ENCOUNTER — HOSPITAL ENCOUNTER (OUTPATIENT)
Dept: OTHER | Age: 57
Discharge: HOME | End: 2017-12-15
Attending: PHYSICIAN ASSISTANT | Admitting: PHYSICIAN ASSISTANT

## 2017-12-15 VITALS
HEART RATE: 74 BPM | BODY MASS INDEX: 30.63 KG/M2 | WEIGHT: 156 LBS | DIASTOLIC BLOOD PRESSURE: 93 MMHG | HEIGHT: 60 IN | SYSTOLIC BLOOD PRESSURE: 144 MMHG

## 2017-12-15 DIAGNOSIS — M17.12 PRIMARY OSTEOARTHRITIS OF LEFT KNEE: ICD-10-CM

## 2017-12-15 DIAGNOSIS — M25.561 CHRONIC PAIN OF RIGHT KNEE: ICD-10-CM

## 2017-12-15 DIAGNOSIS — M23.92 INTERNAL DERANGEMENT OF LEFT KNEE: ICD-10-CM

## 2017-12-15 DIAGNOSIS — G89.29 CHRONIC PAIN OF LEFT KNEE: ICD-10-CM

## 2017-12-15 DIAGNOSIS — M25.562 CHRONIC PAIN OF LEFT KNEE: ICD-10-CM

## 2017-12-15 DIAGNOSIS — M25.562 CHRONIC PAIN OF LEFT KNEE: Primary | ICD-10-CM

## 2017-12-15 DIAGNOSIS — M17.11 PRIMARY OSTEOARTHRITIS OF RIGHT KNEE: ICD-10-CM

## 2017-12-15 DIAGNOSIS — G89.29 CHRONIC PAIN OF LEFT KNEE: Primary | ICD-10-CM

## 2017-12-15 DIAGNOSIS — M22.41 CHONDROMALACIA PATELLAE OF RIGHT KNEE: ICD-10-CM

## 2017-12-15 DIAGNOSIS — Z98.890 S/P RIGHT KNEE ARTHROSCOPY: ICD-10-CM

## 2017-12-15 DIAGNOSIS — M22.42 CHONDROMALACIA PATELLAE OF LEFT KNEE: ICD-10-CM

## 2017-12-15 DIAGNOSIS — G89.29 CHRONIC PAIN OF RIGHT KNEE: ICD-10-CM
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
## 2017-12-15 PROCEDURE — 73562 X-RAY EXAM OF KNEE 3: CPT | Performed by: PHYSICIAN ASSISTANT

## 2017-12-15 PROCEDURE — 20610 DRAIN/INJ JOINT/BURSA W/O US: CPT | Performed by: PHYSICIAN ASSISTANT

## 2017-12-15 PROCEDURE — 99214 OFFICE O/P EST MOD 30 MIN: CPT | Performed by: PHYSICIAN ASSISTANT

## 2017-12-15 NOTE — PATIENT INSTRUCTIONS
Fiordaliza Yousif was instructed to apply ice to the injection area for 15 - 20 minutes several times a day to decrease pain and and swelling. Ice (\"ICE IS YOUR FRIEND\": try using a bag of frozen peas or corn) for 15  20 minutes 3 x day. Limit activities today. You may resume normal activities tomorrow if you have no pain. For severe pain:  If after hours, she is to go to Emergency Room. During office hours she must come in to the office. Fiordaliza Yousif was instructed to call the office if there are any questions or concerns related to her condition. I have asked her to schedule a follow-up appointment for 6-8 weeks from now for re-evaluation and possible repeat injection. She is specifically instructed to contact the office between now & her scheduled appointment if she has concerns related to her condition. She is welcome to call for an appointment sooner if she has any additional concerns or questions. General Medication Instructions:  Any prescriptions must be used as directed. If you have any concerns, questions or require refills, please contact our office. If you experience any adverse reactions, stop the medication and call our office immediately. If you designate a preferred pharmacy, appropriate prescriptions will be sent to your preferred pharmacy for pickup to be use as directed. Patient Driving Instructions:  No driving if you are taking narcotic pain medications or muscle relaxers. PATIENT REMINDER:   Carry a list of your medications and allergies with you at all times. Call your pharmacy and our office at least 1 week in advance to refill prescriptions. Narcotic medications will not be refilled after hours or on weekends. ATTENTION    As of October 2, 2014, the [facility]) has mandated that ALL PRESCRIPTION PAIN MEDICINE cannot be called into your pharmacy.     This includes:    Oxycodone (Percocet)  Hydrocodone (Vicodin, Norco)  Tramadol (Ultram)  Other    These medications must have prescriptions which are written and signed by your provider. This means that you must call ahead and come in to the office to  the paper prescription and take it to your pharmacy. We are sorry for any inconvenience but this is now the law. Patrick Huston PA-C  Physician Assistant, Orthopaedic Surgery    Contact Information:  Sally Nicole,  & Clinical Staff  [phone], [address]         IMPORTANT NARCOTIC INFORMATION: PLEASE READ     [x] DO NOT share your prescription medication with anyone! Sharing is illegal.  The prescription dose is based on your age, weight, and health problems. Sharing your narcotic prescription can lead to accidental death of the individual for which the prescription was not prescribed. You may not know about his/her addiction problem. [x] Always use the same pharmacy when filling your narcotic prescriptions. [x] DO NOT mix your narcotic prescription with alcohol. Mixing the narcotic prescription medication with alcohol causes depressive effects including breathing problems, organ malfunction, and cardiac arrest.     [x] Always keep your narcotic medication in a locked, secured location. Keep your medication private. This is to avoid individuals from taking your medication without your knowledge. This medication is highly sought after and locking your prescriptions will protect you from being a target of crime. [x] DO NOT stock pile your medication. This also will protect you from being a target of crime. [x] Dispose of any unused medications properly. Do not flush the medications. Look for appropriate waste collection programs in your community and drug take back events. [x] DO NOT drive while taking narcotic pain medication or muscle relaxers.          FOR MORE INFORMATION, CHECK OUT THIS RESOURCE    For your convenience, Dr. Mary Hutson has provided the following link that may be helpful for you if you would like more information on your Orthopaedic condition:    www. Orthoinfo. org         If you or someone you know struggles with weight issues and joint pain, please check out this important documentary:      \"Start Moving Start Living\" =  Http://[email or web address] Coveteurving.[email or web address]       (YOUTUBE video SMSL FULL SD)           FALL PREVENTION:  SIMPLE TIPS    Vitamin D3:  Over-the-counter supplement of Vitamin D3, (at least 2,000 IU daily). Vitamin D3 is widely available without a prescription at pharmacies and buying clubs ([facility], [facility]) and on-line at sites such as Amazon.com. It comes in a variety of formulations (tablets, gelcaps, liquid) and doses (1,000 IU, 2,000 IU, 4,000 IU, 5,000 IU and even higher). The right dose for most people is 2,000 IU per day but higher doses are sometimes needed. You can take your vitamin D3 at any time of the day, with or without food, with or without calcium. Because vitamin D is long acting, if you miss your vitamin D on one day you can double up the dose on a later day. Exercise: Low impact exercise programs such as Rangel Chi. Chair Raise Exercise. Yoga. Adult fitness classes at the  or community center. Physical Therapy: Formal physical therapy program to strengthen your lower extremities, improve your balance and gait. Home Assessment: Remove clutter and tripping hazards: loose/throw rugs, cords or cables. Install or placement of railings, grab bars around steps/stairs and in the bathroom (toilet, bath tub, sink). Improve lighting. Foot Wear:  Stable, well fitting. Avoid loose straps or ties, slippery soles. Vision/Eye Check Up: Have your vision checked yearly. Resources:  www.cdc.gov/injury/STEADI    1). STEADI: Stay Independent Self Risk Assessment    2). CDC Handouts:  How to prevent falls, Home Safety Fall Prevention         If you or someone you know struggles with weight issues and joint pain, please check out this important documentary:      \"Start Moving Start Living\" =  Http://startmovingstCroquetteLandliving.com       (YOUTUBE video SMSL FULL SD)

## 2017-12-15 NOTE — PROGRESS NOTES
Benjie Gowers PA-C  Orthopaedic Surgery & Sports Medicine      [x] [address] [facility]   [] [facility] OFFICE  [address]  [address] [address], [address] [place], [address]    [phone] [address]   [phone] Option [phone] (fax)    [phone] (fax)       PATIENT: Funmilayo Valenzuela    61 y.o.  female  [facility]: 1960   MRN:  [id number]       Date of current encounter: 12/15/2017  This encounter is evaluated as a:       [] New Patient Visit    [x] Established Patient Visit   [] Post-Op Visit     [] Consult: requested by         [] Worker's Comp       Patient's PCP is Dr. Juan F Fernandez MD    Subjective:     Chief Complaint   Patient presents with    Knee Pain     ongoing evaluation and treatment of bilateral knees        HPI:  Funmilayo Valenzuela is a 61y.o. year old,  female complaining of bilateral knee pain. The injection she received on her last visit did help. On Thanksgiving she fell up the stairs directly onto both of her knees. This did increase her pain. She has been icing and resting. She rates her pain as a 0 out of 10 at rest and a 10 out of 10 with activity. She describes her pain as sharp, dull, aching, grinding, popping and cracking. It occurs daily and is persistent and constant. I'll activity aggravates her knee pain. She does feel that her knees limit her behavior. Rest and ice help to relieve her pain. She does have night pain. She does have morning stiffness. PAIN ASSESSMENT:   Pain Assessment  Location of Pain: Knee  Location Modifiers: Right, Left  Severity of Pain: 10  Quality of Pain: Sharp, Dull, Aching, Grinding, Cracking, Popping  Duration of Pain: Persistent  Frequency of Pain: Constant  Date Pain First Started: 11/23/17  Aggravating Factors:  Other (Comment) (all)  Limiting Behavior: Yes  Relieving Factors: Rest, Ice  Result of Injury: Yes  Work-Related Injury: No  Are there other pain locations you wish tromethamine; Kiwi extract; Nsaids; Penicillins; and Tolmetin    Medications:  Prior to Visit Medications    Medication Sig Taking? Authorizing Provider   divalproex (DEPAKOTE SPRINKLES) 125 MG capsule Take 125 mg by mouth 2 times daily  Historical Provider, MD   buPROPion (WELLBUTRIN XL) 150 MG extended release tablet Take 150 mg by mouth every morning  Historical Provider, MD   levothyroxine (SYNTHROID) 125 MCG tablet Take 125 mcg by mouth Daily Take one tablet on Saturday and Sunday  Historical Provider, MD   lamoTRIgine (LAMICTAL) 150 MG tablet Take 150 mg by mouth daily  Historical Provider, MD   levothyroxine (SYNTHROID) 137 MCG tablet Take 1 tablet by mouth Daily  Patient taking differently: Take 137 mcg by mouth Daily Take one tablet Monday -Friday  Anupam Cox MD   propranolol (INDERAL LA) 120 MG extended release capsule Take 120 mg by mouth  Historical Provider, MD   DULoxetine (CYMBALTA) 60 MG capsule Take 2 capsules by mouth daily  Char Cuevas MD   Cholecalciferol (VITAMIN D) 2000 UNITS CAPS capsule Take 1 capsule by mouth daily  Historical Provider, MD   clonazePAM (KLONOPIN) 0.5 MG tablet Take 1 tablet by mouth 2 times daily as needed  Char Cuevas MD   omeprazole (PRILOSEC) 40 MG capsule TAKE 1 CAPSULE BY MOUTH DAILY  Char Cuevas MD   topiramate (TOPAMAX) 100 MG tablet TAKE 1 TABLET BY MOUTH TWICE DAILY  Char Cuevas MD   clopidogrel (PLAVIX) 75 MG tablet   Historical Provider, MD   fluticasone (FLONASE) 50 MCG/ACT nasal spray 2 sprays up each nostril daily.   Char Cuevas MD     Social History     Social History    Marital status:      Spouse name: N/A    Number of children: N/A    Years of education: N/A     Occupational History    retired      Social History Main Topics    Smoking status: Never Smoker    Smokeless tobacco: Never Used    Alcohol use 1.2 oz/week     2 Glasses of wine per week      Comment: socially    Drug use: No    Sexual activity: Not Currently     Birth control/ protection: Post-menopausal     Other Topics Concern    Not on file     Social History Narrative    No narrative on file     Family History   Problem Relation Age of Onset    Clotting Disorder Mother     Other Mother      lung disease    Cancer Father      colon, brain    Arthritis Father      polio    Heart Disease Father     Heart Disease Brother     Obesity Brother     Substance Abuse Brother        Review of Systems (ROS):    [x]Performed. August Whipple's review of systems has been performed by intake and observation. The most recent ROS was scanned into the media tab of the chart on 7/14/2017. She has been instructed to contact her primary care physician regarding ROS issues if not already being addressed at this time. There are recent changes which are listed in the HPI    Objective:   Physical Exam  Vital Signs:  BP (!) 144/93   Pulse 74   Ht 5' (1.524 m)   Wt 156 lb (70.8 kg)   BMI 30.47 kg/m²     Constitution:  Generally, Jonathon Silver is [x] alert, [x] appears stated age, and [x] in no distress. Her general body habitus is [] Cachectic  [] Thin  [] Normal  [x] Obese  [] Morbidly Obese    Head: [x] Normocephalic  Eyes: [x] Extra-occular muscles intact   [x]  Wears glasses  Left Ear: [x] External Ear normal   Right Ear: [x] External Ear normal   Nose: [x] Normal  Mouth: [x] Oral mucosa moist  [x] No perioral lesions    Pulmonary: [x] Respirations unlabored and regular  Skin: [x] Warm [x] Well perfused     Psychiatric:   [x] Good judgement and insight  [x] Oriented to [x] person, [x] place, and [x] time. [x] Mood appropriate for circumstances. Gait:   Gait is [] Normal  [x] Impaired limping  Assistive Device: [x] None  [] Knee Brace  [] Richard Cousin  [] Crutches   [] [place]   [] Wheelchair  [] Other     ORTHOPAEDIC KNEE EXAM: BILATERAL   Inspection:  [x] Right knee: pea sized scab infrapatellar. Left shin: 2 band-aids covering \"scrapes. \" There is no drainage or dislocation. (3 views: AP, lateral and patella views) of her left knee taken today 12/15/17 in the office and reviewed by me personally showed mild degenerative changes in the patellofemoral compartment, moderate to severe degenerative changes in the medial compartment and mild degenerative changes in the lateral compartment. There is joint space narrowing and osteophyte formation in the medial compartment. There is neutral alignment. There are no signs of acute fracture or dislocation. [x]  X-ray images were reviewed by me personally today 12/15/2017. PROCEDURE NOTE: BILATERAL KNEE INJECTIONS  12/15/2017 at 11:43 AM   Procedure: Injections  Verbal consent was obtained. Risks and benefits were explained. Questions were encouraged and answered. Timeout Verification Completed including:    Correct patient: Lauren Esipnoza was identified    Correct procedure    Correct site & side    Correct equipment and supplies    Staff member: Vee Rae PA-C     Assistant: Mariusz Yuan     Injection Site: Superolateral  bilaterally    The injection sites were prepped with Chlora-Prep using aseptic technique and bilateral knee intra-articular injections were performed with ethyl chloride for anesthetic. Depomedrol 2 ml (40 mg/ml = 80 mg total) was injected into each knee. A sterile adhesive dressing was applied to each injection site. Post procedure:  Lauren Espinoza tolerated the treatment well. Instructions to patient:  Appropriate post injections instructions were given to Lauren Espinoza. Assessment (Medical Decision Making):     Lauren Espinoza is a 61y.o. year old female with the following diagnosis:    1. Chronic pain of left knee  XR KNEE LEFT (3 VIEWS)    ID ARTHROCENTESIS ASPIR&/INJ MAJOR JT/BURSA W/O US    ID METHYLPREDNISOLONE 40 MG INJ   2. Primary osteoarthritis of left knee  ID ARTHROCENTESIS ASPIR&/INJ MAJOR JT/BURSA W/O US    ID METHYLPREDNISOLONE 40 MG INJ   3.  Chondromalacia patellae of left knee  OH ARTHROCENTESIS ASPIR&/INJ MAJOR JT/BURSA W/O US    OH METHYLPREDNISOLONE 40 MG INJ   4. Chronic pain of right knee  XR KNEE RIGHT (3 VIEWS)    OH ARTHROCENTESIS ASPIR&/INJ MAJOR JT/BURSA W/O US    OH METHYLPREDNISOLONE 40 MG INJ   5. Primary osteoarthritis of right knee  OH ARTHROCENTESIS ASPIR&/INJ MAJOR JT/BURSA W/O US    OH METHYLPREDNISOLONE 40 MG INJ   6. Chondromalacia patellae of right knee  OH ARTHROCENTESIS ASPIR&/INJ MAJOR JT/BURSA W/O US    OH METHYLPREDNISOLONE 40 MG INJ   7. S/P right knee arthroscopy, in 2012 by Dr. Betsy Manjarrez         Her overall course: Worsening since her fall on Thanksgiving. We did discuss the possibility of a meniscus tear in her left knee. An MRI would be diagnostic. She would like to try conservative treatment first (injections and physical therapy) since she has had success with this in the past.     Plan (Medical Decision Making):      I discussed the diagnosis and the treatment options with Anuja Parmar today. In Summary:  1). The various treatment options were outlined and discussed with Anuja Parmar including:      [x] Conservative care options:      physical therapy, ice, bracing, and activity modification       [x] The indications for therapeutic injections Steroids and Hyluronic Acid/Synvisc/Euflexxa/Supartz    2). After considering the various options discussed, Anuja Parmar elected to pursue a course of treatment that includes the following:      [x] MEDICATIONS/REFILLS prescribed today are listed below. No refills today. She cannot take NSAIDS. [x] Physical Therapy/HEP Formal physical therapy for both of her knees      [x] Script: 2 x per week x 4 weeks    [x] Ice to affected area: 15-20 minutes 4 x day    [x] Injection into both of her knees today    [x]  I did recommend she use a walker or cane until she can walk without a limp. Patient is agreeable.      Return to Clinic/Follow - Up:  Anuja Parmar was asked to make a follow-up appointment:    [x] 6-8 weeks if needed. If the pain in her left knee worsens or fails to improve after 3 weeks of physical therapy and after this injection, we will consider a MRI. Eben Guy was instructed to call the office if her symptoms worsen or if new symptoms appear prior to the next scheduled visit. She is specifically instructed to contact the office between now & her scheduled appointment if she has concerns related to her condition or if she needs assistance in scheduling the above tests. She is welcome to call for an appointment sooner if she has any additional concerns or questions. Patient Education Materials Provided:    Patient Instructions     Eben Guy was instructed to apply ice to the injection area for 15 - 20 minutes several times a day to decrease pain and and swelling. Ice (\"ICE IS YOUR FRIEND\": try using a bag of frozen peas or corn) for 15  20 minutes 3 x day. Limit activities today. You may resume normal activities tomorrow if you have no pain. For severe pain:  If after hours, she is to go to Emergency Room. During office hours she must come in to the office. Eben Guy was instructed to call the office if there are any questions or concerns related to her condition. I have asked her to schedule a follow-up appointment for 6-8 weeks from now for re-evaluation and possible repeat injection. She is specifically instructed to contact the office between now & her scheduled appointment if she has concerns related to her condition. She is welcome to call for an appointment sooner if she has any additional concerns or questions. General Medication Instructions:  Any prescriptions must be used as directed. If you have any concerns, questions or require refills, please contact our office. If you experience any adverse reactions, stop the medication and call our office immediately.     If you designate a preferred pharmacy, appropriate prescriptions will be sent to your preferred pharmacy for pickup to be use as directed. Patient Driving Instructions:  No driving if you are taking narcotic pain medications or muscle relaxers. PATIENT REMINDER:   Carry a list of your medications and allergies with you at all times. Call your pharmacy and our office at least 1 week in advance to refill prescriptions. Narcotic medications will not be refilled after hours or on weekends. ATTENTION    As of October 2, 2014, the [place] [address] ([address] [facility]) has mandated that ALL PRESCRIPTION PAIN MEDICINE cannot be called into your pharmacy. This includes:    Oxycodone (Percocet)  Hydrocodone (Vicodin, Norco)  Tramadol (Ultram)  Other    These medications must have prescriptions which are written and signed by your provider. This means that you must call ahead and come in to the office to  the paper prescription and take it to your pharmacy. We are sorry for any inconvenience but this is now the law. Cecilia Howard PA-C  Physician Assistant, Orthopaedic Surgery    Contact Information:  Federico Rice,  & Clinical Staff  [phone], [address]         IMPORTANT NARCOTIC INFORMATION: PLEASE READ     [x] DO NOT share your prescription medication with anyone! Sharing is illegal.  The prescription dose is based on your age, weight, and health problems. Sharing your narcotic prescription can lead to accidental death of the individual for which the prescription was not prescribed. You may not know about his/her addiction problem. [x] Always use the same pharmacy when filling your narcotic prescriptions. [x] DO NOT mix your narcotic prescription with alcohol. Mixing the narcotic prescription medication with alcohol causes depressive effects including breathing problems, organ malfunction, and cardiac arrest.     [x] Always keep your narcotic medication in a locked, secured location. Keep your medication private.   This is to avoid individuals from taking your medication without your knowledge. This medication is highly sought after and locking your prescriptions will protect you from being a target of crime. [x] DO NOT stock pile your medication. This also will protect you from being a target of crime. [x] Dispose of any unused medications properly. Do not flush the medications. Look for appropriate waste collection programs in your community and drug take back events. [x] DO NOT drive while taking narcotic pain medication or muscle relaxers. FOR MORE INFORMATION, CHECK OUT THIS RESOURCE    For your convenience, Dr. Aly Blanc has provided the following link that may be helpful for you if you would like more information on your Orthopaedic condition:    www. Orthoinfo. org         If you or someone you know struggles with weight issues and joint pain, please check out this important documentary:      \"Start Moving Start Living\" =  Http://startmovingS.N. Safe&Softwareving.[employer]       (CoreworksUBE video SMSL FULL SD)           FALL PREVENTION:  SIMPLE TIPS    Vitamin D3:  Over-the-counter supplement of Vitamin D3, (at least 2,000 IU daily). Vitamin D3 is widely available without a prescription at pharmacies and buying clubs ([facility], [facility], [facility]) and on-line at sites such as Amazon.com. It comes in a variety of formulations (tablets, gelcaps, liquid) and doses (1,000 IU, 2,000 IU, 4,000 IU, 5,000 IU and even higher). The right dose for most people is 2,000 IU per day but higher doses are sometimes needed. You can take your vitamin D3 at any time of the day, with or without food, with or without calcium. Because vitamin D is long acting, if you miss your vitamin D on one day you can double up the dose on a later day. Exercise: Low impact exercise programs such as Rangel Chi. Chair Raise Exercise. Yoga. Adult fitness classes at the  or community center.     Physical Therapy: Formal physical therapy program to strengthen your lower extremities, improve your balance and gait. Home Assessment: Remove clutter and tripping hazards: loose/throw rugs, cords or cables. Install or placement of railings, grab bars around steps/stairs and in the bathroom (toilet, bath tub, sink). Improve lighting. Foot Wear:  Stable, well fitting. Avoid loose straps or ties, slippery soles. Vision/Eye Check Up: Have your vision checked yearly. Resources:  www.cdc.gov/injury/STEADI    1). STEADI: Stay Independent Self Risk Assessment    2). CDC Handouts: How to prevent falls, Home Safety Fall Prevention         If you or someone you know struggles with weight issues and joint pain, please check out this important documentary:      \"Start Moving Start Living\" =  Http://FanLib.Soundstache       (Kendra Mai video SMSL FULL SD)              Orders Placed This Encounter   Procedures    XR KNEE LEFT (3 VIEWS)     Standing Status:   Future     Standing Expiration Date:   12/15/2018     Order Specific Question:   Reason for exam:     Answer:   pain    XR KNEE RIGHT (3 VIEWS)     Standing Status:   Future     Standing Expiration Date:   12/15/2018     Order Specific Question:   Reason for exam:     Answer:   pain       Refills/New Prescriptions:  No orders of the defined types were placed in this encounter.     Today's prescription medications will be e-scribed (when appropriate) to the Patient's Preferred Pharmacy:   [facility] [id number] [place] [address]  [address]  Phone: [phone] Fax: [phone]    [facility] [address] [facility] [address], [address] [address]  Phone: [phone] Fax: [phone]       Vee Rae PA-C  Electronically signed by Vee Rae PA-C on 12/15/2017 at 11:43 AM     Contact Information:  Jeff Fried,  &  Clinical Staff  [phone], Option

## 2017-12-15 NOTE — LETTER
rates her pain as a 0 out of 10 at rest and a 10 out of 10 with activity. She describes her pain as sharp, dull, aching, grinding, popping and cracking. It occurs daily and is persistent and constant. I'll activity aggravates her knee pain. She does feel that her knees limit her behavior. Rest and ice help to relieve her pain. She does have night pain. She does have morning stiffness. PAIN ASSESSMENT:   Pain Assessment  Location of Pain: Knee  Location Modifiers: Right, Left  Severity of Pain: 10  Quality of Pain: Sharp, Dull, Aching, Grinding, Cracking, Popping  Duration of Pain: Persistent  Frequency of Pain: Constant  Date Pain First Started: 11/23/17  Aggravating Factors:  Other (Comment) (all)  Limiting Behavior: Yes  Relieving Factors: Rest, Ice  Result of Injury: Yes  Work-Related Injury: No  Are there other pain locations you wish to document?: No    Patient Active Problem List   Diagnosis    Headache    Hyperlipidemia    [facility]erebral [facility] occlusion [facility] infarction [facility]    GERD (gastroesophageal reflux disease)    Hypothyroidism    Anxiety    Depression    H/O suicide attempt    Cerebral infarction ([facility]    Disc disorder of cervical region    Fibromyalgia    Generalized osteoarthritis    Irritable bowel syndrome    Impingement syndrome of shoulder region    Patent foramen ovale    Spondyloarthropathy (HCC)    Menopause    History of total vaginal hysterectomy    Vaginal atrophy    S/P right knee arthroscopy, in 2012 by Dr. Carrington Hardy Primary osteoarthritis of left knee    Chondromalacia patellae of left knee    Decreased libido    Chondromalacia patellae of right knee    Primary osteoarthritis of right knee    Tear of medial meniscus of right knee    Knee effusion    Right knee pain    Left knee pain    Chronic pain of right knee    Chronic pain of left knee    Dyspareunia in female     Past Medical History:   Diagnosis Date    Anticoagulant long-term use  Anxiety     Arthritis     [facility] disorder ([facility] [address] [facility]     Cerebrovascular disease 2/2014    stroke    Depression     Fibromyalgia     Generalized osteoarthritis 3/6/2015    GERD (gastroesophageal reflux disease)     H/O suicide attempt     Headache(784.0)     Hyperlipidemia     Hypertension     Hypothyroidism     Irritable bowel syndrome     Obesity     Patent foramen ovale 2/27/2014    Unspecified cerebral artery occlusion with cerebral infarction      Past Surgical History:   Procedure Laterality Date    CHOLECYSTECTOMY  1990's    laparoscopic    COLONOSCOPY  2014    COLONOSCOPY  06/29/2017    random biopsies    HYSTERECTOMY  1980's    total vaginal hysterectomy, retains both ovaries, menorrhagia, fibroid tumors    KNEE CARTILAGE SURGERY Right     THYROIDECTOMY         Allergies:  Latex; Codeine; Ketorolac; Ketorolac tromethamine; Kiwi extract; Nsaids; Penicillins; and Tolmetin    Medications:  Prior to Visit Medications    Medication Sig Taking?  Authorizing Provider   divalproex (DEPAKOTE SPRINKLES) 125 MG capsule Take 125 mg by mouth 2 times daily  Historical Provider, MD   buPROPion (WELLBUTRIN XL) 150 MG extended release tablet Take 150 mg by mouth every morning  Historical Provider, MD   levothyroxine (SYNTHROID) 125 MCG tablet Take 125 mcg by mouth Daily Take one tablet on Saturday and Sunday  Historical Provider, MD   lamoTRIgine (LAMICTAL) 150 MG tablet Take 150 mg by mouth daily  Historical Provider, MD   levothyroxine (SYNTHROID) 137 MCG tablet Take 1 tablet by mouth Daily  Patient taking differently: Take 137 mcg by mouth Daily Take one tablet Monday -Friday  Anupam Escamilla MD   propranolol (INDERAL LA) 120 MG extended release capsule Take 120 mg by mouth  Historical Provider, MD   DULoxetine (CYMBALTA) 60 MG capsule Take 2 capsules by mouth daily  Azam Salcedo MD   Cholecalciferol (VITAMIN D) 2000 UNITS CAPS capsule Take 1 capsule by mouth daily  Historical Provider, MD Her general body habitus is  Cachectic   Thin   Normal   Obese   Morbidly Obese    Head:  Normocephalic  Eyes:  Extra-occular muscles intact     Wears glasses  Left Ear:  External Ear normal   Right Ear:  External Ear normal   Nose:  Normal  Mouth:  Oral mucosa moist   No perioral lesions    Pulmonary:  Respirations unlabored and regular  Skin:  Warm  Well perfused     Psychiatric:    Good judgement and insight   Oriented to  person,  place, and  time. Mood appropriate for circumstances. Gait:   Gait is  Normal   Impaired limping  Assistive Device:  None   Knee Brace   Cane   Crutches    Walker    Wheelchair   Other     ORTHOPAEDIC KNEE EXAM: BILATERAL   Inspection:   Right knee: pea sized scab infrapatellar. Left shin: 2 band-aids covering \"scrapes. \" There is no drainage or signs of infection. Ecchymosis:   none   mild   moderate   severe   Atrophy:   none   mild   moderate   severe   Effusion:  none   mild   moderate   severe   Scar / Surgical incision(s): A-Scope Portals   Open Surgical Incision(s)    Alignment:   Normal   Varus  Valgus    Range of Motion:   Normal Knee ROM          Deferred: acute injury/post-surgery/pain    Limited ROM: 0-90° with crepitation    Palpation:    No Tenderness   Tenderness: Right knee anterior and medial joint line. Left knee anterior, medial joint line and posterior  mild   moderate   severe    Patellofemoral Crepitation:   none   mild   moderate   severe   Baker's Cyst:   none   small   medium   large   Peng's Sign:  negative   positive     Provocative Tests:     Positive Tests:   Meniscus Testing:    Medial Oriana's Test (MMT) left knee    Lateral Oriana's Test (LMT)     Motor Function:    No gross motor weakness   Motor weakness:   mild   moderate   severe     Neurologic:   Sensation to light touch intact    Coordination / proprioception intact    Circulation:   The limb is warm and well perfused   Capillary refill is intact. Edema   none   mild   moderate   severe   Venous stasis changes   none   mild   moderate   severe    Bilateral Hip Exam:   Negative logroll     Data Reviewed:     XRays:  (3 views: AP, lateral and patella views) of her right knee taken today 12/15/17 in the office and reviewed by me personally showed mild to moderate degenerative changes in the patellofemoral compartment, moderate to severe degenerative changes in the medial compartment and mild degenerative changes in the lateral compartment. There is joint space narrowing and osteophyte formation in the patellofemoral and medial compartments. There is neutral alignment. There are no signs of acute fracture or dislocation. (3 views: AP, lateral and patella views) of her left knee taken today 12/15/17 in the office and reviewed by me personally showed mild degenerative changes in the patellofemoral compartment, moderate to severe degenerative changes in the medial compartment and mild degenerative changes in the lateral compartment. There is joint space narrowing and osteophyte formation in the medial compartment. There is neutral alignment. There are no signs of acute fracture or dislocation. X-ray images were reviewed by me personally today 12/15/2017. PROCEDURE NOTE: BILATERAL KNEE INJECTIONS  12/15/2017 at 11:43 AM   Procedure: Injections  Verbal consent was obtained. Risks and benefits were explained. Questions were encouraged and answered.       Timeout Verification Completed including:    Correct patient: Fiordaliza Yousif was identified    Correct procedure    Correct site & side    Correct equipment and supplies    Staff member: Deja Dewey PA-C     Assistant: Kee Yuan     Injection Site: Superolateral  bilaterally    The injection sites were prepped with Chlora-Prep using aseptic technique and bilateral knee intra-articular injections were performed with ethyl chloride for anesthetic. Depomedrol 2 ml (40 mg/ml = 80 mg total) was injected into each knee. A sterile adhesive dressing was applied to each injection site. Post procedure:  Brendan Matos tolerated the treatment well. Instructions to patient:  Appropriate post injections instructions were given to Brendan Matos. Assessment (Medical Decision Making):     Brendan Matos is a 61y.o. year old female with the following diagnosis:    1. Chronic pain of left knee  XR KNEE LEFT (3 VIEWS)    IN ARTHROCENTESIS ASPIR&/INJ MAJOR JT/BURSA W/O US    IN METHYLPREDNISOLONE 40 MG INJ   2. Primary osteoarthritis of left knee  IN ARTHROCENTESIS ASPIR&/INJ MAJOR JT/BURSA W/O US    IN METHYLPREDNISOLONE 40 MG INJ   3. Chondromalacia patellae of left knee  IN ARTHROCENTESIS ASPIR&/INJ MAJOR JT/BURSA W/O US    IN METHYLPREDNISOLONE 40 MG INJ   4. Chronic pain of right knee  XR KNEE RIGHT (3 VIEWS)    IN ARTHROCENTESIS ASPIR&/INJ MAJOR JT/BURSA W/O US    IN METHYLPREDNISOLONE 40 MG INJ   5. Primary osteoarthritis of right knee  IN ARTHROCENTESIS ASPIR&/INJ MAJOR JT/BURSA W/O US    IN METHYLPREDNISOLONE 40 MG INJ   6. Chondromalacia patellae of right knee  IN ARTHROCENTESIS ASPIR&/INJ MAJOR JT/BURSA W/O US    IN METHYLPREDNISOLONE 40 MG INJ   7. S/P right knee arthroscopy, in 2012 by Dr. Billy Law         Her overall course: Worsening since her fall on Thanksgiving. We did discuss the possibility of a meniscus tear in her left knee. An MRI would be diagnostic. She would like to try conservative treatment first (injections and physical therapy) since she has had success with this in the past.     Plan (Medical Decision Making):      I discussed the diagnosis and the treatment options with Brendan Matos today. In Summary:  1).  The various treatment options were outlined and discussed with Brendan Matos including:       Conservative care options:      physical therapy, ice, bracing, and activity modification if there are any questions or concerns related to her condition. I have asked her to schedule a follow-up appointment for 6-8 weeks from now for re-evaluation and possible repeat injection. She is specifically instructed to contact the office between now & her scheduled appointment if she has concerns related to her condition. She is welcome to call for an appointment sooner if she has any additional concerns or questions. General Medication Instructions:  Any prescriptions must be used as directed. If you have any concerns, questions or require refills, please contact our office. If you experience any adverse reactions, stop the medication and call our office immediately. If you designate a preferred pharmacy, appropriate prescriptions will be sent to your preferred pharmacy for pickup to be use as directed. Patient Driving Instructions:  No driving if you are taking narcotic pain medications or muscle relaxers. PATIENT REMINDER:   Carry a list of your medications and allergies with you at all times. Call your pharmacy and our office at least 1 week in advance to refill prescriptions. Narcotic medications will not be refilled after hours or on weekends. ATTENTION    As of October 2, 2014, the [facility] ([address]) has mandated that ALL PRESCRIPTION PAIN MEDICINE cannot be called into your pharmacy. This includes:    Oxycodone (Percocet)  Hydrocodone (Vicodin, Norco)  Tramadol (Ultram)  Other    These medications must have prescriptions which are written and signed by your provider. This means that you must call ahead and come in to the office to  the paper prescription and take it to your pharmacy. We are sorry for any inconvenience but this is now the law.     Yissel Ware PA-C  Physician Assistant, Orthopaedic Surgery    Contact Information:  Avery Felix,  & Clinical Staff  [phone], Option 3 Order Specific Question:   Reason for exam:     Answer:   pain       Refills/New Prescriptions:  No orders of the defined types were placed in this encounter. Today's prescription medications will be e-scribed (when appropriate) to the Patient's Preferred Pharmacy:   [facility] [address] - [id number]  Ayleen Germain  Phone: [phone] Fax: [phone]    [facility], [address] [address]  [address] [place] [address] [address] [place] [address]  Phone: [phone] Fax: [phone]       Fernanda Turner PA-C  Electronically signed by Fernanda Turner PA-C on 12/15/2017 at 11:43 AM     Contact Information:  Tommie Oconnell,  &  Clinical Staff  [phone], Option 3    This dictation was performed with a verbal recognition program (DRAGON) and it was checked for errors. It is possible that there are still dictated errors within this office note. If so, please bring any errors to my attention for an addendum. All efforts were made to ensure that this office note is accurate. I have personally performed and/or participated in the history, physical exam and medical decision making and reviewed all pertinent clinical information unless otherwise noted.

## 2018-01-22 RX ORDER — SODIUM CHLORIDE, SODIUM LACTATE, POTASSIUM CHLORIDE, CALCIUM CHLORIDE 600; 310; 30; 20 MG/100ML; MG/100ML; MG/100ML; MG/100ML
INJECTION, SOLUTION INTRAVENOUS CONTINUOUS
Status: CANCELLED | OUTPATIENT
Start: 2018-01-22

## 2018-01-22 NOTE — ANESTHESIA PRE-OP
libido R68.82    Chondromalacia patellae of right knee M22.41    Primary osteoarthritis of right knee M17.11    Tear of medial meniscus of right knee S83.241A    Knee effusion M25.469    Right knee pain M25.561    Left knee pain M25.562    Chronic pain of right knee M25.561, G89.29    Chronic pain of left knee M25.562, G89.29    Dyspareunia in female N94.10       Past Medical History:        Diagnosis Date    Anticoagulant long-term use     Anxiety     Arthritis     B[facility] disorder ([facility] [address] [facility]     Cerebrovascular disease 2/2014    stroke    Depression     Fibromyalgia     Generalized osteoarthritis 3/6/2015    GERD (gastroesophageal reflux disease)     H/O suicide attempt     Headache(784.0)     Hyperlipidemia     Hypertension     Hypothyroidism     Irritable bowel syndrome     Obesity     Patent foramen ovale 2/27/2014    Unspecified cerebral artery occlusion with cerebral infarction        Past Surgical History:        Procedure Laterality Date    CHOLECYSTECTOMY  1990's    laparoscopic    COLONOSCOPY  2014    COLONOSCOPY  06/29/2017    random biopsies    HYSTERECTOMY  1980's    total vaginal hysterectomy, retains both ovaries, menorrhagia, fibroid tumors    KNEE CARTILAGE SURGERY Right     THYROIDECTOMY         Social History:    Social History   Substance Use Topics    Smoking status: Never Smoker    Smokeless tobacco: Never Used    Alcohol use 1.2 oz/week     2 Glasses of wine per week      Comment: socially                                Counseling given: Not Answered      Vital Signs (Current): There were no vitals filed for this visit.                                            BP Readings from Last 3 Encounters:   12/15/17 (!) 144/93   12/06/17 114/81   10/27/17 113/72       NPO Status:                                                                                 BMI:   Wt Readings from Last 3 Encounters:   01/16/18 160 lb (72.6 kg)   12/15/17 156 lb (70.8 kg)   12/06/17 160 13.6 02/07/2017 11:38 AM    HCT 40.8 02/07/2017 11:38 AM     02/07/2017 11:38 AM     RENAL  Lab Results   Component Value Date/Time     02/07/2017 11:38 AM    K 4.1 02/07/2017 11:38 AM     02/07/2017 11:38 AM    CO2 22 02/07/2017 11:38 AM    BUN 16 02/07/2017 11:38 AM    CREATININE 1.0 02/07/2017 11:38 AM    GLUCOSE 88 02/07/2017 11:38 AM     COAGS  Lab Results   Component Value Date/Time    PROTIME 13.3 (H) 11/15/2014 06:33 AM    INR 2.7 08/21/2015 11:21 AM           Jeovanny Arreguin MD   1/22/2018

## 2018-01-23 ENCOUNTER — HOSPITAL ENCOUNTER (OUTPATIENT)
Dept: SURGERY | Age: 58
Discharge: HOME | End: 2018-01-23
Admitting: INTERNAL MEDICINE

## 2018-01-23 VITALS
SYSTOLIC BLOOD PRESSURE: 118 MMHG | DIASTOLIC BLOOD PRESSURE: 70 MMHG | HEIGHT: 60 IN | TEMPERATURE: 98 F | OXYGEN SATURATION: 97 % | WEIGHT: 160 LBS | RESPIRATION RATE: 16 BRPM | HEART RATE: 65 BPM | BODY MASS INDEX: 31.41 KG/M2

## 2018-01-23 DIAGNOSIS — Z83.71 FAMILY HISTORY OF COLONIC POLYPS: ICD-10-CM

## 2018-01-23 RX ORDER — OXYCODONE HYDROCHLORIDE AND ACETAMINOPHEN 5; 325 MG/1; MG/1
2 TABLET ORAL PRN
Status: ACTIVE | OUTPATIENT
Start: 2018-01-23 | End: 2018-01-23

## 2018-01-23 RX ORDER — LIDOCAINE HYDROCHLORIDE 10 MG/ML
1 INJECTION, SOLUTION EPIDURAL; INFILTRATION; INTRACAUDAL; PERINEURAL ONCE
Status: DISCONTINUED | OUTPATIENT
Start: 2018-01-23 | End: 2018-01-24 | Stop reason: HOSPADM

## 2018-01-23 RX ORDER — LABETALOL HYDROCHLORIDE 5 MG/ML
5 INJECTION, SOLUTION INTRAVENOUS EVERY 10 MIN PRN
Status: DISCONTINUED | OUTPATIENT
Start: 2018-01-23 | End: 2018-01-24 | Stop reason: HOSPADM

## 2018-01-23 RX ORDER — ONDANSETRON 2 MG/ML
4 INJECTION INTRAMUSCULAR; INTRAVENOUS PRN
Status: DISCONTINUED | OUTPATIENT
Start: 2018-01-23 | End: 2018-01-24 | Stop reason: HOSPADM

## 2018-01-23 RX ORDER — MORPHINE SULFATE 2 MG/ML
2 INJECTION, SOLUTION INTRAMUSCULAR; INTRAVENOUS EVERY 5 MIN PRN
Status: DISCONTINUED | OUTPATIENT
Start: 2018-01-23 | End: 2018-01-24 | Stop reason: HOSPADM

## 2018-01-23 RX ORDER — MORPHINE SULFATE 2 MG/ML
1 INJECTION, SOLUTION INTRAMUSCULAR; INTRAVENOUS EVERY 5 MIN PRN
Status: DISCONTINUED | OUTPATIENT
Start: 2018-01-23 | End: 2018-01-24 | Stop reason: HOSPADM

## 2018-01-23 RX ORDER — PROMETHAZINE HYDROCHLORIDE 25 MG/ML
6.25 INJECTION, SOLUTION INTRAMUSCULAR; INTRAVENOUS
Status: DISCONTINUED | OUTPATIENT
Start: 2018-01-23 | End: 2018-01-24 | Stop reason: HOSPADM

## 2018-01-23 RX ORDER — MEPERIDINE HYDROCHLORIDE 50 MG/ML
12.5 INJECTION INTRAMUSCULAR; INTRAVENOUS; SUBCUTANEOUS EVERY 5 MIN PRN
Status: DISCONTINUED | OUTPATIENT
Start: 2018-01-23 | End: 2018-01-24 | Stop reason: HOSPADM

## 2018-01-23 RX ORDER — OXYCODONE HYDROCHLORIDE AND ACETAMINOPHEN 5; 325 MG/1; MG/1
1 TABLET ORAL PRN
Status: ACTIVE | OUTPATIENT
Start: 2018-01-23 | End: 2018-01-23

## 2018-01-23 RX ORDER — DIPHENHYDRAMINE HYDROCHLORIDE 50 MG/ML
12.5 INJECTION INTRAMUSCULAR; INTRAVENOUS
Status: ACTIVE | OUTPATIENT
Start: 2018-01-23 | End: 2018-01-23

## 2018-01-23 RX ORDER — HYDRALAZINE HYDROCHLORIDE 20 MG/ML
5 INJECTION INTRAMUSCULAR; INTRAVENOUS EVERY 10 MIN PRN
Status: DISCONTINUED | OUTPATIENT
Start: 2018-01-23 | End: 2018-01-24 | Stop reason: HOSPADM

## 2018-01-23 RX ORDER — SODIUM CHLORIDE, SODIUM LACTATE, POTASSIUM CHLORIDE, CALCIUM CHLORIDE 600; 310; 30; 20 MG/100ML; MG/100ML; MG/100ML; MG/100ML
1000 INJECTION, SOLUTION INTRAVENOUS ONCE
Status: COMPLETED | OUTPATIENT
Start: 2018-01-23 | End: 2018-01-23

## 2018-01-23 RX ADMIN — SODIUM CHLORIDE, SODIUM LACTATE, POTASSIUM CHLORIDE, CALCIUM CHLORIDE 1000 ML: 600; 310; 30; 20 INJECTION, SOLUTION INTRAVENOUS at 08:08

## 2018-01-23 ASSESSMENT — PAIN SCALES - GENERAL
PAINLEVEL_OUTOF10: 0

## 2018-01-23 ASSESSMENT — PAIN - FUNCTIONAL ASSESSMENT: PAIN_FUNCTIONAL_ASSESSMENT: 0-10

## 2018-01-23 ASSESSMENT — PAIN DESCRIPTION - DESCRIPTORS: DESCRIPTORS: ACHING

## 2018-01-23 NOTE — ANESTHESIA POST-OP
Postoperative Anesthesia Note    Name:    Rodrigo Taylor  MRN:      [id number]    Patient Vitals for the past 12 hrs:   BP Temp Temp src Pulse Resp SpO2 Height Weight   01/23/18 0924 118/70 - - 65 16 97 % - -   01/23/18 0909 117/75 - - 68 16 98 % - -   01/23/18 0854 110/74 - - 72 16 95 % - -   01/23/18 0800 (!) 104/49 98 °F (36.7 °C) Temporal 71 18 98 % 5' (1.524 m) 160 lb (72.6 kg)        LABS:    CBC  Lab Results   Component Value Date/Time    WBC 6.5 02/07/2017 11:38 AM    HGB 13.6 02/07/2017 11:38 AM    HCT 40.8 02/07/2017 11:38 AM     02/07/2017 11:38 AM     RENAL  Lab Results   Component Value Date/Time     02/07/2017 11:38 AM    K 4.1 02/07/2017 11:38 AM     02/07/2017 11:38 AM    CO2 22 02/07/2017 11:38 AM    BUN 16 02/07/2017 11:38 AM    CREATININE 1.0 02/07/2017 11:38 AM    GLUCOSE 88 02/07/2017 11:38 AM     COAGS  Lab Results   Component Value Date/Time    PROTIME 13.3 (H) 11/15/2014 06:33 AM    INR 2.7 08/21/2015 11:21 AM       Intake & Output: In: 500 [I.V.:500]  Out: -     Nausea & Vomiting:  No    Level of Consciousness:  Awake    Pain Assessment:  Adequate analgesia    Anesthesia Complications:  No apparent anesthetic complications    SUMMARY      Vital signs stable  OK to discharge from Stage I post anesthesia care.   Care transferred from Anesthesiology department on discharge from perioperative area

## 2018-01-23 NOTE — PROCEDURES
advanced from the  anus to the cecum. Appendiceal orifice and ileocecal valve were  visualized. Cecum was photographed. Colonoscope was then slowly  withdrawn. Each colonic segment was evaluated carefully. Care was taken  to inspect the proximal aspects of the IC valve, haustral folds as well  flexures. In the cecum, a 1 cm sessile polyp was removed in piecemeal  fashion with cold snare. Ileocecal valve was intubated. Terminal ileum  appeared grossly normal.  As the colonoscope was withdrawn, random biopsies  were obtained of the ascending and transverse to screen for microscopic  colitis. Retroflexed view of the rectum was unremarkable. Colonoscope was  then withdrawn. The patient's procedure was terminated, well tolerated, no  immediate complications. The prep was adequate. POSTPROCEDURE DIAGNOSES:  Polyp, hemorrhoids. I will contact this patient with pathology results. If this is an  adenomatous polyp, a 3 year followup will be recommended. If hyperplastic,  5 years should suffice. I will continue to follow her closely with regards  to management of diarrhea.         Karthik Haro MD    D: 01/23/2018 8:56:32       T: 01/23/2018 8:58:03     [email or web address]  Job#: [id number]     Doc#: [id number]    CC:  Malaika Pichardo MD

## 2018-01-23 NOTE — PLAN OF CARE
ENDOSCOPY  PRE, INTRA, & POST PROCEDURAL  CARE PLAN    HEMODYNAMIC STATUS  INTERDISCIPLINARY   Goal: Hemodynamic Status to Baseline / Discharge Criteria Met  Interventions     1. Obtain Patient  Medical /  Surgical History     1 Assess & Review Allergies Prior to Endo & All  Meds (PRN)     2. Assess / Monitor Vital Signs / LOC (PRN). Intra Procedure VS/ LOC  Q5 Mins  & As Per Policy Until Discharge. 3. Obtain Baseline Jatin & Post Procedural  Jatin Per   Policy     4. Check Monitors, Alarms On     5. Patient Re Assessed by Physician     6. Monitor  I&O Post Procedure   NURSING   SAFETY & PSYCHO SOCIAL  INTERDISCIPLINARY   Goal: Patient Returns to Baseline Activity/ Meets Discharge Criteria  Interventions     1. Greet Patient with ID Badge/ Picture in View (PRN)     2. Be Available & Sensitive to Patients Needs (PRN)     3. Communicate referral to Pastoral Care as Appropriate (PRN)     4. Provide Age Specific Measures (PRN)     4. Admission Data Base Reviewed     5. Administer Meds Per Orders (PRN)     5. Maintain Baseline Activity  Or Activity as Ordered Per Physician     NUTRITION   INTERDISCIPLINARY   Goal: Patient to baseline/ Improved Nutrition  Interventions     1. Assess NPO Status / Notify Physician if Necessary (PRN)     2. NPO per Physician Orders     3. Assess Nutritional Status (PRN)     4. Assess Ability to Swallow as Indicated     LAB & DIAGNOSTICS   Goal: Additional Tests per Physicians   Orders  Interventions     1. Lab & Diagnostics per Physician Orders (PRN)     2.  Obtain  Urine / Serum HCG & FSBS On All Diabetic Patients  Per Policy & (PRN)     3. Assess Lab Time Out  for  Patient Safety as Needed (PRN)   RESPIRATORY  INTERDISCIPLINARY   Goal: Airway   Patency, SAO2   Saturation, Cough mechanism Maintained   Interventions     1. Evaluate Bilateral Breath Sounds  Baseline, (PRN), & Prior to Discharge     2. Weight & Height Noted ( PRN)     3.   Assess Baseline SPo2 (PRN)

## 2018-01-23 NOTE — H&P
Pre-sedation Assessment    History and Physical / Pre-Sedation Assessment  Patient:  Juan A Rainey   :   1960     Intended Procedure: CLS      HPI: FHx  Nurses notes reviewed and agreed. Medications reviewed  Allergies: Allergies   Allergen Reactions    Latex Other (See Comments)     Turns skin really red    Codeine Hives    Ketorolac     Ketorolac Tromethamine     Kiwi Extract Itching    Nsaids Other (See Comments)     \"tear up my stomach\"    Penicillins Hives    Tolmetin            Physical Exam:  Vital Signs: BP (!) 104/49   Pulse 71   Temp 98 °F (36.7 °C) (Temporal)   Resp 18   Ht 5' (1.524 m)   Wt 160 lb (72.6 kg)   SpO2 98%   BMI 31.25 kg/m²  Body mass index is 31.25 kg/m². Airway:Normal  Cardiac:Normal  Pulmonary:Normal  Abdomen:Normal  Specific to procedure:       Pre-Procedure Assessment/Plan:  ASA 2 - Patient with mild systemic disease with no functional limitations    Level of Sedation Plan:Deep sedation    Post Procedure plan: Return to same level of care    I assessed the patient and find that the patient is in satisfactory condition to proceed with the planned procedure and sedation plan. I have explained the risk, benefits, and alternatives to the procedure. The patient understands and agrees to proceed.   Yes    Eber Ceballos  8:29 AM 2018

## 2018-01-26 ENCOUNTER — OFFICE VISIT (OUTPATIENT)
Dept: ORTHOPEDIC SURGERY | Age: 58
End: 2018-01-26

## 2018-01-26 VITALS
SYSTOLIC BLOOD PRESSURE: 128 MMHG | DIASTOLIC BLOOD PRESSURE: 81 MMHG | BODY MASS INDEX: 31.42 KG/M2 | WEIGHT: 160.05 LBS | HEART RATE: 74 BPM | HEIGHT: 60 IN

## 2018-01-26 DIAGNOSIS — G89.29 CHRONIC PAIN OF RIGHT KNEE: ICD-10-CM

## 2018-01-26 DIAGNOSIS — M25.562 CHRONIC PAIN OF LEFT KNEE: Primary | ICD-10-CM

## 2018-01-26 DIAGNOSIS — M22.42 CHONDROMALACIA PATELLAE OF LEFT KNEE: ICD-10-CM

## 2018-01-26 DIAGNOSIS — G89.29 CHRONIC PAIN OF LEFT KNEE: Primary | ICD-10-CM

## 2018-01-26 DIAGNOSIS — M17.12 PRIMARY OSTEOARTHRITIS OF LEFT KNEE: ICD-10-CM

## 2018-01-26 DIAGNOSIS — Z98.890 S/P RIGHT KNEE ARTHROSCOPY: ICD-10-CM

## 2018-01-26 DIAGNOSIS — M25.561 CHRONIC PAIN OF RIGHT KNEE: ICD-10-CM

## 2018-01-26 DIAGNOSIS — M17.11 PRIMARY OSTEOARTHRITIS OF RIGHT KNEE: ICD-10-CM

## 2018-01-26 DIAGNOSIS — M22.41 CHONDROMALACIA PATELLAE OF RIGHT KNEE: ICD-10-CM
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
## 2018-01-26 PROCEDURE — 20610 DRAIN/INJ JOINT/BURSA W/O US: CPT | Performed by: PHYSICIAN ASSISTANT

## 2018-01-26 PROCEDURE — 99999 PR OFFICE/OUTPT VISIT,PROCEDURE ONLY: CPT | Performed by: PHYSICIAN ASSISTANT

## 2018-01-26 NOTE — PROGRESS NOTES
Right     THYROIDECTOMY         Allergies:  Latex; Codeine; Ketorolac; Ketorolac tromethamine; Kiwi extract; Nsaids; Penicillins; and Tolmetin    Medications:  Prior to Visit Medications    Medication Sig Taking? Authorizing Provider   divalproex (DEPAKOTE SPRINKLES) 125 MG capsule Take 125 mg by mouth 2 times daily  Historical Provider, MD   lamoTRIgine (LAMICTAL) 150 MG tablet Take 150 mg by mouth daily  Historical Provider, MD   levothyroxine (SYNTHROID) 137 MCG tablet Take 1 tablet by mouth Daily  Patient taking differently: Take 137 mcg by mouth Daily Take one tablet Monday -Friday  Anupam Hunter MD   propranolol (INDERAL LA) 120 MG extended release capsule Take 120 mg by mouth  Historical Provider, MD   DULoxetine (CYMBALTA) 60 MG capsule Take 2 capsules by mouth daily  Rosana Del Cid MD   Cholecalciferol (VITAMIN D) 2000 UNITS CAPS capsule Take 1 capsule by mouth daily  Historical Provider, MD   clonazePAM (KLONOPIN) 0.5 MG tablet Take 1 tablet by mouth 2 times daily as needed  Rosana Del Cid MD   omeprazole (PRILOSEC) 40 MG capsule TAKE 1 CAPSULE BY MOUTH DAILY  Rosana Del Cid MD   topiramate (TOPAMAX) 100 MG tablet TAKE 1 TABLET BY MOUTH TWICE DAILY  Rosana Del Cid MD   clopidogrel (PLAVIX) 75 MG tablet   Historical Provider, MD   fluticasone (FLONASE) 50 MCG/ACT nasal spray 2 sprays up each nostril daily.   Rosana Del Cid MD     Social History     Social History    Marital status:      Spouse name: N/A    Number of children: N/A    Years of education: N/A     Occupational History    retired      Social History Main Topics    Smoking status: Never Smoker    Smokeless tobacco: Never Used    Alcohol use 1.2 oz/week     2 Glasses of wine per week      Comment: socially    Drug use: No    Sexual activity: Not Currently     Birth control/ protection: Post-menopausal     Other Topics Concern    Not on file     Social History Narrative    No narrative on file     Family History   Problem Relation Age of Onset    Clotting Disorder Mother     Other Mother      lung disease    Cancer Father      colon, brain    Arthritis Father      polio    Heart Disease Father     Heart Disease Brother     Obesity Brother     Substance Abuse Brother        Review of Systems (ROS):    [x]Performed. Nelson Whipple's review of systems has been performed by intake and observation. The most recent ROS was scanned into the media tab of the chart on 7/14/2017. She has been instructed to contact her primary care physician regarding ROS issues if not already being addressed at this time. There are no recent changes. Objective:   Physical Exam  Vital Signs:  /81   Pulse 74   Ht 5' (1.524 m)   Wt 160 lb 0.9 oz (72.6 kg)   BMI 31.26 kg/m²     Constitution:  Generally, Juan A Rainey is [x] alert, [x] appears stated age, and [x] in no distress. Her general body habitus is [] Cachectic  [] Thin  [] Normal  [x] Obese  [] Morbidly Obese    Head: [x] Normocephalic  Eyes: [x] Extra-occular muscles intact   [x]  Wears glasses  Left Ear: [x] External Ear normal   Right Ear: [x] External Ear normal   Nose: [x] Normal  Mouth: [x] Oral mucosa moist  [x] No perioral lesions    Pulmonary: [x] Respirations unlabored and regular  Skin: [x] Warm [x] Well perfused     Psychiatric:   [x] Good judgement and insight  [x] Oriented to [x] person, [x] place, and [x] time. [x] Mood appropriate for circumstances. Gait:   Gait is [] Normal  [x] Impaired slight limp  Assistive Device: [x] None  [] Knee Brace  [] Little De La Garza  [] Crutches   [] Jocelynn Common   [] Wheelchair  [] Other     ORTHOPAEDIC KNEE EXAM: BILATERAL   Inspection:  [x] Skin intact without abrasion or lacerations.   [x] Ecchymosis:  [x] none  [] mild  [] moderate  [] severe  [] Atrophy:  [x] none  [] mild  [] moderate  [] severe  [x] Effusion: [x] none  [] mild  [] moderate  [] severe  [] Scar / Surgical incision(s): [] A-Scope Portals  [] Open Surgical if new symptoms appear prior to the next scheduled visit. She is specifically instructed to contact the office between now & her scheduled appointment if she has concerns related to her condition or if she needs assistance in scheduling the above tests. She is welcome to call for an appointment sooner if she has any additional concerns or questions. Patient Education Materials Provided:    Patient Instructions     Danette Jones was instructed to apply ice to the injection area for 15 - 20 minutes several times a day to decrease pain and and swelling. Ice (\"ICE IS YOUR FRIEND\": try using a bag of frozen peas or corn) for 15  20 minutes 3 x day. Limit activities today. You may resume normal activities tomorrow if you have no pain. For severe pain:  If after hours, she is to go to Emergency Room. During office hours she must come in to the office. Danette Jones was instructed to call the office if there are any questions or concerns related to her condition. I have asked her to schedule a follow-up appointment for 6-8 weeks from now for re-evaluation and possible repeat injection. She is specifically instructed to contact the office between now & her scheduled appointment if she has concerns related to her condition. She is welcome to call for an appointment sooner if she has any additional concerns or questions. General Medication Instructions:  Any prescriptions must be used as directed. If you have any concerns, questions or require refills, please contact our office. If you experience any adverse reactions, stop the medication and call our office immediately. If you designate a preferred pharmacy, appropriate prescriptions will be sent to your preferred pharmacy for pickup to be use as directed. Patient Driving Instructions:  No driving if you are taking narcotic pain medications or muscle relaxers.     PATIENT REMINDER:   Carry a list of your medications and allergies with you

## 2018-01-26 NOTE — PATIENT INSTRUCTIONS
Norco)  Tramadol (Ultram)  Other    These medications must have prescriptions which are written and signed by your provider. This means that you must call ahead and come in to the office to  the paper prescription and take it to your pharmacy. We are sorry for any inconvenience but this is now the law. Josh Alford PA-C  Physician Assistant, Orthopaedic Surgery    Contact Information:  Calin Gomezgennaro,  & Clinical Staff  [phone], Option 3         IMPORTANT NARCOTIC INFORMATION: PLEASE READ     [x] DO NOT share your prescription medication with anyone! Sharing is illegal.  The prescription dose is based on your age, weight, and health problems. Sharing your narcotic prescription can lead to accidental death of the individual for which the prescription was not prescribed. You may not know about his/her addiction problem. [x] Always use the same pharmacy when filling your narcotic prescriptions. [x] DO NOT mix your narcotic prescription with alcohol. Mixing the narcotic prescription medication with alcohol causes depressive effects including breathing problems, organ malfunction, and cardiac arrest.     [x] Always keep your narcotic medication in a locked, secured location. Keep your medication private. This is to avoid individuals from taking your medication without your knowledge. This medication is highly sought after and locking your prescriptions will protect you from being a target of crime. [x] DO NOT stock pile your medication. This also will protect you from being a target of crime. [x] Dispose of any unused medications properly. Do not flush the medications. Look for appropriate waste collection programs in your community and drug take back events. [x] DO NOT drive while taking narcotic pain medication or muscle relaxers.          FOR MORE INFORMATION, CHECK OUT THIS RESOURCE    For your convenience, Dr. Louisa Shea has provided the following link

## 2018-02-15 RX ORDER — LEVOTHYROXINE SODIUM 137 UG/1
137 TABLET ORAL DAILY
Qty: 90 TABLET | Refills: 2 | Status: SHIPPED | OUTPATIENT
Start: 2018-02-15 | End: 2018-10-20 | Stop reason: SDUPTHER

## 2018-03-19 ENCOUNTER — OFFICE VISIT (OUTPATIENT)
Dept: ORTHOPEDIC SURGERY | Age: 58
End: 2018-03-19

## 2018-03-19 VITALS
DIASTOLIC BLOOD PRESSURE: 82 MMHG | HEIGHT: 60 IN | HEART RATE: 72 BPM | SYSTOLIC BLOOD PRESSURE: 128 MMHG | WEIGHT: 160.05 LBS | BODY MASS INDEX: 31.42 KG/M2

## 2018-03-19 DIAGNOSIS — M17.0 PRIMARY OSTEOARTHRITIS OF BOTH KNEES: Primary | ICD-10-CM

## 2018-03-19 PROCEDURE — 99214 OFFICE O/P EST MOD 30 MIN: CPT | Performed by: ORTHOPAEDIC SURGERY

## 2018-03-19 NOTE — PROGRESS NOTES
bilaterally. Negative Homans testing bilaterally. · Skin:  There are no rashes, ulcerations or lesions. · Gait & station: Normal gait today. · Additional Examinations:        Lower Back: Examination of the lower back does not show any tenderness, deformity or injury. Range of motion is unremarkable. There is no gross instability. There are no rashes, ulcerations or lesions. Strength and tone are normal.      Diagnostic Testing:      Previous x-rays were reviewed and reveal tricompartmental arthritic changes to both knees more advanced on the right with medial joint space narrowing and osteophyte formation. Medial joint space narrowing also noted on the left knee. Orders   No orders of the defined types were placed in this encounter. Assessment / Treatment Plan:     1. Bilateral knee osteoarthritis    2. Left knee medial meniscus tear    I discussed with the patient the nature of osteoarthritis of the knee. We talked about treatment of arthritis and the various options that are involved with this. The patient understands that the treatments can vary from essentially doing nothing to a total joint replacement arthroplasty for arthritis. I then went on to describe the utilization of glucosamine and chondroitin sulfate as a joint nutrition product. We talked about the fact that this is essentially a joint vitamin with typically minimal side effects. We also talked about utilization of prescription over-the-counter anti-inflammatory medications as the next option. We also discussed the possibility of brace wear or orthotic wear if the patient has significant varus alignment. We then went on to discuss the possibility of Visco supplementation with hyaluronate products. We talked about the typical course of this type of treatment and the fact that often times in the treatment for significant arthritis, this is successful less than half the time.  We also talked about the corticosteroid injections and the fact that this can give a brief window of relief, but does not cure the problem; in fact, the pain often has a rebound effect in 6-10 weeks after the steroid has worn off. We also discussed arthroscopy surgery in attempts to debride the joint, but the fact that this is relatively unreliable treatment in the face of significant arthritis. It can occasionally be used, particularly if there is significant meniscus pathology. Lastly we discussed total joint replacement arthroplasty as the final and definitive step in treatment of arthritis. Patient realizes the magnitude of this type of treatment as well as having voiced a general understanding to the duration of the prosthesis. The patient voiced understanding to these continuum of treatment options. Given the patient's continued pain on the left knee and mechanical type findings, we would like to obtain a MRI of the left knee to rule out medial meniscus tear. We discussed that we could consider arthroscopy to this knee however, she does realize we would simply be delaying a total joint replacement and hopefully setting her up to respond better to injection. I have personally performed and/or participated in the history, exam and medical decision making and agree with all pertinent clinical information. I have also reviewed and agree with the past medical, family and social history unless otherwise noted. This dictation was performed with a verbal recognition program (DRAGON) and it was checked for errors. It is possible that there are still dictated errors within this office note. If so, please bring any errors to my attention for an addendum. All efforts were made to ensure that this office note is accurate.           Nhan Montoya MD

## 2018-04-09 ENCOUNTER — TELEPHONE (OUTPATIENT)
Dept: ORTHOPEDIC SURGERY | Age: 58
End: 2018-04-09
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
## 2018-04-16 ENCOUNTER — OFFICE VISIT (OUTPATIENT)
Dept: ORTHOPEDIC SURGERY | Age: 58
End: 2018-04-16

## 2018-04-16 VITALS
BODY MASS INDEX: 31.42 KG/M2 | WEIGHT: 160.05 LBS | HEIGHT: 60 IN | HEART RATE: 84 BPM | SYSTOLIC BLOOD PRESSURE: 122 MMHG | DIASTOLIC BLOOD PRESSURE: 73 MMHG

## 2018-04-16 DIAGNOSIS — M17.0 PRIMARY OSTEOARTHRITIS OF BOTH KNEES: ICD-10-CM

## 2018-04-16 DIAGNOSIS — M17.12 PRIMARY OSTEOARTHRITIS OF LEFT KNEE: ICD-10-CM

## 2018-04-16 DIAGNOSIS — M25.562 LEFT KNEE PAIN, UNSPECIFIED CHRONICITY: Primary | ICD-10-CM

## 2018-04-16 PROCEDURE — 99214 OFFICE O/P EST MOD 30 MIN: CPT | Performed by: ORTHOPAEDIC SURGERY

## 2018-04-16 PROCEDURE — L1830 KO IMMOB CANVAS LONG PRE OTS: HCPCS | Performed by: ORTHOPAEDIC SURGERY

## 2018-04-16 RX ORDER — TRAMADOL HYDROCHLORIDE 50 MG/1
50 TABLET ORAL EVERY 6 HOURS PRN
Qty: 20 TABLET | Refills: 0 | Status: SHIPPED | OUTPATIENT
Start: 2018-04-16 | End: 2018-04-21

## 2018-05-04 DIAGNOSIS — M17.12 PRIMARY OSTEOARTHRITIS OF LEFT KNEE: Primary | ICD-10-CM

## 2018-05-04 RX ORDER — TRAMADOL HYDROCHLORIDE 50 MG/1
50 TABLET ORAL EVERY 6 HOURS PRN
Qty: 28 TABLET | Refills: 0 | Status: SHIPPED | OUTPATIENT
Start: 2018-05-04 | End: 2018-05-11

## 2018-05-04 RX ORDER — TIZANIDINE 4 MG/1
4 TABLET ORAL DAILY
Qty: 40 TABLET | Refills: 0 | Status: SHIPPED | OUTPATIENT
Start: 2018-05-04 | End: 2018-06-21 | Stop reason: SDUPTHER

## 2018-05-25 DIAGNOSIS — M17.11 PRIMARY OSTEOARTHRITIS OF RIGHT KNEE: Primary | ICD-10-CM

## 2018-05-30 RX ORDER — TRAMADOL HYDROCHLORIDE 50 MG/1
50 TABLET ORAL EVERY 6 HOURS PRN
Qty: 28 TABLET | Refills: 0 | Status: SHIPPED | OUTPATIENT
Start: 2018-05-30 | End: 2018-06-21 | Stop reason: SDUPTHER

## 2018-06-14 ENCOUNTER — TELEPHONE (OUTPATIENT)
Dept: CASE MANAGEMENT | Age: 58
End: 2018-06-14

## 2018-06-18 ENCOUNTER — TELEPHONE (OUTPATIENT)
Dept: ORTHOPEDIC SURGERY | Age: 58
End: 2018-06-18

## 2018-06-19 ENCOUNTER — OFFICE VISIT (OUTPATIENT)
Dept: FAMILY MEDICINE CLINIC | Age: 58
End: 2018-06-19

## 2018-06-19 ENCOUNTER — TELEPHONE (OUTPATIENT)
Dept: ORTHOPEDIC SURGERY | Age: 58
End: 2018-06-19

## 2018-06-19 ENCOUNTER — HOSPITAL ENCOUNTER (OUTPATIENT)
Dept: OTHER | Age: 58
Discharge: HOME | End: 2018-06-19
Attending: ORTHOPAEDIC SURGERY | Admitting: ORTHOPAEDIC SURGERY

## 2018-06-19 VITALS
TEMPERATURE: 98.1 F | BODY MASS INDEX: 31.8 KG/M2 | HEIGHT: 60 IN | HEART RATE: 70 BPM | SYSTOLIC BLOOD PRESSURE: 120 MMHG | OXYGEN SATURATION: 98 % | DIASTOLIC BLOOD PRESSURE: 74 MMHG | RESPIRATION RATE: 18 BRPM | WEIGHT: 162 LBS

## 2018-06-19 DIAGNOSIS — Z01.818 PRE-OP EVALUATION: Primary | ICD-10-CM

## 2018-06-19 LAB
ABO/RH: NORMAL
ALBUMIN SERPL-MCNC: 4.2 G/DL (ref 3.4–5)
ANION GAP SERPL CALCULATED.3IONS-SCNC: 16 MMOL/L (ref 3–16)
ANTIBODY SCREEN: NORMAL
APTT: 33.9 SEC (ref 26–36)
BACTERIA: ABNORMAL /HPF
BASOPHILS ABSOLUTE: 0 K/UL (ref 0–0.2)
BASOPHILS RELATIVE PERCENT: 0.6 %
BILIRUBIN URINE: NEGATIVE
BLOOD, URINE: NEGATIVE
BUN BLDV-MCNC: 15 MG/DL (ref 7–20)
CALCIUM SERPL-MCNC: 9.6 MG/DL (ref 8.3–10.6)
CHLORIDE BLD-SCNC: 99 MMOL/L (ref 99–110)
CLARITY: CLEAR
CO2: 24 MMOL/L (ref 21–32)
COLOR: YELLOW
CREAT SERPL-MCNC: 0.9 MG/DL (ref 0.6–1.1)
EKG ATRIAL RATE: 69 BPM
EKG DIAGNOSIS: NORMAL
EKG P AXIS: 16 DEGREES
EKG P-R INTERVAL: 122 MS
EKG Q-T INTERVAL: 424 MS
EKG QRS DURATION: 86 MS
EKG QTC CALCULATION (BAZETT): 454 MS
EKG R AXIS: 25 DEGREES
EKG T AXIS: 36 DEGREES
EKG VENTRICULAR RATE: 69 BPM
EOSINOPHILS ABSOLUTE: 0.1 K/UL (ref 0–0.6)
EOSINOPHILS RELATIVE PERCENT: 1.5 %
EPITHELIAL CELLS, UA: ABNORMAL /HPF
GFR AFRICAN AMERICAN: >60
GFR NON-AFRICAN AMERICAN: >60
GLUCOSE BLD-MCNC: 87 MG/DL (ref 70–99)
GLUCOSE URINE: NEGATIVE MG/DL
HCT VFR BLD CALC: 42.2 % (ref 36–48)
HEMOGLOBIN: 14.3 G/DL (ref 12–16)
INR BLD: 0.89 (ref 0.86–1.14)
KETONES, URINE: NEGATIVE MG/DL
LEUKOCYTE ESTERASE, URINE: ABNORMAL
LYMPHOCYTES ABSOLUTE: 2.2 K/UL (ref 1–5.1)
LYMPHOCYTES RELATIVE PERCENT: 31.6 %
MCH RBC QN AUTO: 30.6 PG (ref 26–34)
MCHC RBC AUTO-ENTMCNC: 34 G/DL (ref 31–36)
MCV RBC AUTO: 90 FL (ref 80–100)
MICROSCOPIC EXAMINATION: YES
MONOCYTES ABSOLUTE: 0.4 K/UL (ref 0–1.3)
MONOCYTES RELATIVE PERCENT: 5.8 %
NEUTROPHILS ABSOLUTE: 4.1 K/UL (ref 1.7–7.7)
NEUTROPHILS RELATIVE PERCENT: 60.5 %
NITRITE, URINE: NEGATIVE
PDW BLD-RTO: 14.4 % (ref 12.4–15.4)
PH UA: 7
PLATELET # BLD: 171 K/UL (ref 135–450)
PMV BLD AUTO: 7.3 FL (ref 5–10.5)
POTASSIUM SERPL-SCNC: 4.4 MMOL/L (ref 3.5–5.1)
PROTEIN UA: NEGATIVE MG/DL
PROTHROMBIN TIME: 10.2 SEC (ref 9.8–13)
RBC # BLD: 4.68 M/UL (ref 4–5.2)
RBC UA: ABNORMAL /HPF (ref 0–2)
RENAL EPITHELIAL, UA: ABNORMAL /HPF
SODIUM BLD-SCNC: 139 MMOL/L (ref 136–145)
SPECIFIC GRAVITY UA: 1.01
TRANSFERRIN: 248 MG/DL (ref 200–360)
URINE TYPE: ABNORMAL
UROBILINOGEN, URINE: 0.2 E.U./DL
WBC # BLD: 6.8 K/UL (ref 4–11)
WBC UA: ABNORMAL /HPF (ref 0–5)

## 2018-06-19 PROCEDURE — 93010 ELECTROCARDIOGRAM REPORT: CPT | Performed by: INTERNAL MEDICINE

## 2018-06-19 PROCEDURE — 99242 OFF/OP CONSLTJ NEW/EST SF 20: CPT | Performed by: NURSE PRACTITIONER

## 2018-06-19 ASSESSMENT — PATIENT HEALTH QUESTIONNAIRE - PHQ9
SUM OF ALL RESPONSES TO PHQ9 QUESTIONS 1 & 2: 0
1. LITTLE INTEREST OR PLEASURE IN DOING THINGS: 0
SUM OF ALL RESPONSES TO PHQ QUESTIONS 1-9: 0
2. FEELING DOWN, DEPRESSED OR HOPELESS: 0

## 2018-06-20 LAB — URINE CULTURE, ROUTINE: NORMAL

## 2018-06-21 DIAGNOSIS — M17.11 PRIMARY OSTEOARTHRITIS OF RIGHT KNEE: ICD-10-CM

## 2018-06-21 RX ORDER — TIZANIDINE 4 MG/1
4 TABLET ORAL DAILY
Qty: 40 TABLET | Refills: 0 | Status: SHIPPED | OUTPATIENT
Start: 2018-06-21 | End: 2018-07-13 | Stop reason: SDUPTHER

## 2018-06-22 RX ORDER — TRAMADOL HYDROCHLORIDE 50 MG/1
50 TABLET ORAL EVERY 6 HOURS PRN
Qty: 24 TABLET | Refills: 0 | Status: ON HOLD | OUTPATIENT
Start: 2018-06-22 | End: 2018-07-01 | Stop reason: HOSPADM

## 2018-06-28 PROBLEM — Z96.652 S/P TKR (TOTAL KNEE REPLACEMENT) USING CEMENT, LEFT: Status: ACTIVE | Noted: 2018-06-28

## 2018-06-28 PROBLEM — Z96.652 STATUS POST TOTAL LEFT KNEE REPLACEMENT: Status: ACTIVE | Noted: 2018-06-28

## 2018-07-03 ENCOUNTER — TELEPHONE (OUTPATIENT)
Dept: ORTHOPEDIC SURGERY | Age: 58
End: 2018-07-03

## 2018-07-11 ENCOUNTER — TELEPHONE (OUTPATIENT)
Dept: ORTHOPEDIC SURGERY | Age: 58
End: 2018-07-11

## 2018-07-13 ENCOUNTER — OFFICE VISIT (OUTPATIENT)
Dept: ORTHOPEDIC SURGERY | Age: 58
End: 2018-07-13

## 2018-07-13 VITALS
WEIGHT: 162 LBS | DIASTOLIC BLOOD PRESSURE: 72 MMHG | SYSTOLIC BLOOD PRESSURE: 111 MMHG | HEART RATE: 80 BPM | HEIGHT: 60 IN | BODY MASS INDEX: 31.8 KG/M2

## 2018-07-13 DIAGNOSIS — Z96.652 S/P TKR (TOTAL KNEE REPLACEMENT) USING CEMENT, LEFT: Primary | ICD-10-CM

## 2018-07-13 DIAGNOSIS — M25.562 PAIN IN JOINT OF LEFT KNEE: ICD-10-CM

## 2018-07-13 DIAGNOSIS — M17.12 PRIMARY OSTEOARTHRITIS OF LEFT KNEE: ICD-10-CM

## 2018-07-13 PROCEDURE — 99024 POSTOP FOLLOW-UP VISIT: CPT | Performed by: PHYSICIAN ASSISTANT

## 2018-07-13 RX ORDER — TIZANIDINE 4 MG/1
4 TABLET ORAL EVERY 8 HOURS PRN
Qty: 30 TABLET | Refills: 1 | Status: SHIPPED | OUTPATIENT
Start: 2018-07-13 | End: 2018-09-20 | Stop reason: SDUPTHER

## 2018-07-13 RX ORDER — HYDROCODONE BITARTRATE AND ACETAMINOPHEN 5; 325 MG/1; MG/1
1 TABLET ORAL EVERY 6 HOURS PRN
Qty: 28 TABLET | Refills: 0 | Status: SHIPPED | OUTPATIENT
Start: 2018-07-13 | End: 2018-08-06 | Stop reason: SDUPTHER

## 2018-07-13 NOTE — PROGRESS NOTES
Status post left total knee: 6/28/2018    History of present illness: The patient returns today after LEFT total knee replacement performed on 6/28/2018. Pain control has been satisfactory with oral medications. There have been no fevers or chills. She is using very minimal pain medication, primarily just Tylenol now. She has been working with home physical therapy. Pain Assessment  Location of Pain: Knee  Location Modifiers: Left  Severity of Pain: 5  Quality of Pain: Aching, Sharp  Duration of Pain: Persistent  Frequency of Pain: Constant  Aggravating Factors: Walking, Standing  Limiting Behavior: Some  Relieving Factors: Rest, Other (Comment)]    Physical examination: The incision is clean, dry, and intact with no drainage or signs of infection. Range of motion is 2 degrees of extension to 95 degrees of flexion. There is expected swelling with no evidence of DVT and a negative Homans sign. Neurovascular exam is intact. Radiographs: 3 X-rays taken today including AP, lateral, and skyline views of the LEFT knee show excellent alignment of the prosthesis. No evidence of septic or aseptic loosening or periprosthetic fractures. Assessment/plan: We would like to begin outpatient physical therapy to restore range of motion and strength. We discussed antibiotic prophylaxis for dental procedures for the 1st year postoperatively. The patient was given local wound care instructions. We will followup in 3-4 weeks for reevaluation.

## 2018-07-16 ENCOUNTER — HOSPITAL ENCOUNTER (OUTPATIENT)
Dept: PHYSICAL THERAPY | Age: 58
Setting detail: THERAPIES SERIES
Discharge: HOME | End: 2018-07-16
Payer: COMMERCIAL

## 2018-07-16 PROCEDURE — 97016 VASOPNEUMATIC DEVICE THERAPY: CPT

## 2018-07-16 PROCEDURE — 97110 THERAPEUTIC EXERCISES: CPT

## 2018-07-16 PROCEDURE — G8979 MOBILITY GOAL STATUS: HCPCS

## 2018-07-16 PROCEDURE — G8978 MOBILITY CURRENT STATUS: HCPCS

## 2018-07-16 PROCEDURE — G0283 ELEC STIM OTHER THAN WOUND: HCPCS

## 2018-07-16 PROCEDURE — 97140 MANUAL THERAPY 1/> REGIONS: CPT

## 2018-07-16 PROCEDURE — 97161 PT EVAL LOW COMPLEX 20 MIN: CPT

## 2018-07-16 NOTE — FLOWSHEET NOTE
[facility], [address] [place] [address]  Phone: [phone]  Fax [phone]    Physical Therapy Daily Treatment Note  Date:  2018    Patient Name:  Markie Man    :  1960  MRN: [id number]  Restrictions/Precautions:  HTN, Anxiety, Depression, H/O Suicide Attempt, Cerebrovascular DX, OA, Migraines, IBS, Obesity, HLD, OA  Medical/Treatment Diagnosis Information:  Diagnosis: S55.884 S/P Left TKR (DOS: 2018),  M25.562 Left Knee Pain,  M17.12 Left Knee OA  Treatment Diagnosis: M25.562 Left Knee Pain, M25.462 Left Knee Edema,  M25.662 Left Knee Stiffness,  M62.81 Left LE Weakness, R26.2 Diffiuclty Walking. Insurance/Certification information:  PT Insurance Information: [employer] (BMN, No Auth)  Physician Information:  Referring Practitioner: Dr. Serina Borden of care signed (Y/N): due 10-8-2018    Date of Patient follow up with Physician:     G-Code (if applicable):      Date G-Code Applied:    PT G-Codes  Functional Assessment Tool Used: LEFS  Score: 68%  Functional Limitation: Mobility: Walking and moving around  Mobility: Walking and Moving Around Current Status (): At least 60 percent but less than 80 percent impaired, limited or restricted  Mobility: Walking and Moving Around Goal Status (): At least 20 percent but less than 40 percent impaired, limited or restricted    Progress Note: [x]  Yes  []  No  Next due by: Visit #10       Latex Allergy:  [x]NO      []YES  Preferred Language for Healthcare:   [x]English       []other:    Visit # Insurance Allowable Requires auth   1 2xwk for 12 weeks    [x]no        []yes:       Pain level:  4-6/10  Left knee     SUBJECTIVE:  See eval    OBJECTIVE: See eval  2.5 weeks post-op  Pt was instructed to purchase compression stocking for edema control.   Observation:   Test measurements:      RESTRICTIONS/PRECAUTIONS: Left Knee TKR w/ cement (DOS 2018)

## 2018-07-16 NOTE — PLAN OF CARE
approximately one week ago. Has polar cold pack @ home. No compression stocking. Relevant Medical History:  Post-op Left Knee X-ray (7-): X-rays taken today including AP, lateral, and skyline views of the LEFT knee show excellent alignment of the prosthesis. No evidence of septic or aseptic loosening or periprosthetic fractures. Functional Disability Index:PT G-Codes  Functional Assessment Tool Used: LEFS  Score: 68%  Functional Limitation: Mobility: Walking and moving around  Mobility: Walking and Moving Around Current Status (): At least 60 percent but less than 80 percent impaired, limited or restricted  Mobility: Walking and Moving Around Goal Status (): At least 20 percent but less than 40 percent impaired, limited or restricted    Pain Scale: 4-6/10  Easing factors: Polar Pac, rest, elevation  Provocative factors: transferring, bed mobility, rolling, standing, walking, getting in and out of car, squatting, kneeling, driving,    Type: [x]Constant   []Intermittent  []Radiating []Localized []other:     Numbness/Tingling: lateral knee    Occupation/School: Retired, enjoys gardening, walking neighborhood. Living Status/Prior Level of Function: Lives with spouse in ranch style home with 2 steps @ entry. Independent with ADLs and IADLs.     OBJECTIVE:     ROM LEFT RIGHT   HIP Flex     HIP Abd     HIP Ext     HIP IR     HIP ER     Knee ext -5° 0    Knee Flex 91° 140   Ankle PF     Ankle DF     Ankle In     Ankle Ev     Strength  LEFT RIGHT   HIP Flexors 3- 5   HIP Abductors 3- 5   HIP Ext 3+ 5   Hip ER     Knee EXT (quad) 3+ 5   Knee Flex (HS) 3+ 5   Ankle DF     Ankle PF     Ankle Inv     Ankle EV          Circumference  Mid apex  7 cm prox     42 cm  36 cm  35 cm   38 cm  41 cm  33.5 cm     Reflexes/Sensation:    [x]Dermatomes/Myotomes intact, other than lateral knee   []Reflexes equal and normal bilaterally   []Other:    Joint mobility:    []Normal    [x]Hypo   []Hyper    Palpation: []Age  []Sex              [x]Motivation/Lack of Motivation                        [x]Co-Morbidities              []Cognitive Function, education/learning barriers              []Environmental, home barriers              []profession/work barriers  []past PT/medical experience  []other:  Justification:     Falls Risk Assessment (30 days):   [x] Falls Risk assessed and no intervention required. [] Falls Risk assessed and Patient requires intervention due to being higher risk   TUG score (>12s at risk): With SC = 10 sec. [x] Falls education provided, including remove throw rugs, stand and stabilize before initiating walk, use walk when fatigued and @ night to use rest room. G-Codes:  PT G-Codes  Functional Assessment Tool Used: LEFS  Score: 68%  Functional Limitation: Mobility: Walking and moving around  Mobility: Walking and Moving Around Current Status (): At least 60 percent but less than 80 percent impaired, limited or restricted  Mobility: Walking and Moving Around Goal Status ():  At least 20 percent but less than 40 percent impaired, limited or restricted    ASSESSMENT:   Functional Impairments:     [x]Noted lumbar/proximal hip/LE joint hypomobility   [x]Decreased LE functional ROM   [x]Decreased core/proximal hip strength and neuromuscular control   [x]Decreased LE functional strength   []Reduced balance/proprioceptive control   []other:      Functional Activity Limitations (from functional questionnaire and intake)   [x]Reduced ability to tolerate prolonged functional positions   [x]Reduced ability or difficulty with changes of positions or transfers between positions   [x]Reduced ability to maintain good posture and demonstrate good body mechanics with sitting, bending, and lifting   []Reduced ability to sleep   [x] Reduced ability or tolerance with driving and/or computer work   [x]Reduced ability to perform lifting, carrying tasks   [x]Reduced ability to squat   [x]Reduced ability to forward bend   [x]Reduced ability to ambulate prolonged functional periods/distances/surfaces   [x]Reduced ability to ascend/descend stairs   [x]Reduced ability to run, hop, cut or jump   []other:    Participation Restrictions   [x]Reduced participation in self care activities   [x]Reduced participation in home management activities   [x]Reduced participation in work activities   [x]Reduced participation in social activities. [x]Reduced participation in sport/recreation activities. Classification :    [x]Signs/symptoms consistent with post-surgical status including decreased ROM, strength and function.    []Signs/symptoms consistent with joint sprain/strain   []Signs/symptoms consistent with patella-femoral syndrome   []Signs/symptoms consistent with knee OA/hip OA   []Signs/symptoms consistent with internal derangement of knee/Hip   []Signs/symptoms consistent with functional hip weakness/NMR control      []Signs/symptoms consistent with tendinitis/tendinosis    []signs/symptoms consistent with pathology which may benefit from Dry needling      []other:      Prognosis/Rehab Potential:      []Excellent   [x]Good    []Fair   []Poor    Tolerance of evaluation/treatment:    []Excellent   [x]Good    []Fair   []Poor  Physical Therapy Evaluation Complexity Justification  [x] A history of present problem with:  [] no personal factors and/or comorbidities that impact the plan of care;  []1-2 personal factors and/or comorbidities that impact the plan of care  [x]3 personal factors and/or comorbidities that impact the plan of care  [x] An examination of body systems using standardized tests and measures addressing any of the following: body structures and functions (impairments), activity limitations, and/or participation restrictions;:  [x] a total of 1-2 or more elements   [] a total of 3 or more elements   [] a total of 4 or more elements   [x] A clinical presentation with:  [x] stable and/or uncomplicated characteristics   [] evolving clinical presentation with changing characteristics  [] unstable and unpredictable characteristics;   [x] Clinical decision making of [x] low, [] moderate, [] high complexity using standardized patient assessment instrument and/or measurable assessment of functional outcome. [x] EVAL (LOW) 11937 (typically 20 minutes face-to-face)  [] EVAL (MOD) 04675 (typically 30 minutes face-to-face)  [] EVAL (HIGH) 37203 (typically 45 minutes face-to-face)  [] RE-EVAL       PLAN:   Frequency/Duration:  2 days per week for  12Weeks:  Interventions:  [x]  Therapeutic exercise including: strength training, ROM, for Lower extremity and core   [x]  NMR activation and proprioception for LE, Glutes and Core   [x]  Manual therapy as indicated for LE, Hip and spine to include: Dry Needling/IASTM, STM, PROM, Gr I-IV mobilizations, manipulation. [x] Modalities as needed that may include: thermal agents, E-stim, Biofeedback, US, iontophoresis as indicated  [x] Patient education on joint protection, postural re-education, activity modification, progression of HEP. HEP instruction: Pt given written HEP (see scanned forms). GOALS:  Patient stated goal: Walk with good gait pattern in community w/o AD. Therapist goals for Patient:   Short Term Goals: To be achieved in: 2 weeks  1. Independent in HEP and progression per patient tolerance, in order to prevent re-injury. 2. Patient will have a decrease in pain to facilitate improvement in movement, function, and ADLs as indicated by Functional Deficits. Long Term Goals: To be achieved in: 12 weeks  1. Disability index score of 35 % or less for the LEFS to assist with reaching prior level of function. 2. Patient will demonstrate increased AROM to 0-120 left knee extension to flexion to allow for proper joint functioning as indicated by patients Functional Deficits.    3. Patient will demonstrate an increase in Strength to good proximal hip strength

## 2018-07-19 ENCOUNTER — HOSPITAL ENCOUNTER (OUTPATIENT)
Dept: PHYSICAL THERAPY | Age: 58
Setting detail: THERAPIES SERIES
Discharge: HOME | End: 2018-07-19
Payer: COMMERCIAL

## 2018-07-19 PROCEDURE — G0283 ELEC STIM OTHER THAN WOUND: HCPCS | Performed by: PHYSICAL THERAPIST

## 2018-07-19 PROCEDURE — 97016 VASOPNEUMATIC DEVICE THERAPY: CPT | Performed by: PHYSICAL THERAPIST

## 2018-07-19 PROCEDURE — 97110 THERAPEUTIC EXERCISES: CPT | Performed by: PHYSICAL THERAPIST

## 2018-07-19 PROCEDURE — 97112 NEUROMUSCULAR REEDUCATION: CPT | Performed by: PHYSICAL THERAPIST

## 2018-07-19 PROCEDURE — 97140 MANUAL THERAPY 1/> REGIONS: CPT | Performed by: PHYSICAL THERAPIST

## 2018-07-19 NOTE — FLOWSHEET NOTE
flexion to allow for proper joint functioning as indicated by patients Functional Deficits. 3. Patient will demonstrate an increase in Strength to good proximal hip strength and control, within 5lb HHD in LE to allow for proper functional mobility as indicated by patients Functional Deficits. 4. Patient will return to ascending and descending a flight of stairs with reciprocal gait and  Touch on handrail without increased symptoms or restriction. 5. Walk with good gait pattern in community w/o AD. Progression Towards Functional goals:  [] Patient is progressing as expected towards functional goals listed. [] Progression is slowed due to complexities listed. [] Progression has been slowed due to co-morbidities. [x] Plan just implemented, too soon to assess goals progression  [] Other:     ASSESSMENT:  Increased L knee flex after MT. Pt requires lots of verbal cues for proper form with gait with SC.       Treatment/Activity Tolerance:  [x] Patient tolerated treatment well [] Patient limited by fatique  [x] Patient limited by pain  [] Patient limited by other medical complications  [] Other:     Prognosis: [x] Good [] Fair  [] Poor    Patient Requires Follow-up: [x] Yes  [] No    PLAN: Cont to work on knee flex ROM and gait training with SC   [x] Continue per plan of care [] Alter current plan (see comments)  [] Plan of care initiated [] Hold pending MD visit [] Discharge    Electronically signed by: Tali Wiggins, [address]

## 2018-07-20 ENCOUNTER — TELEPHONE (OUTPATIENT)
Dept: ORTHOPEDIC SURGERY | Age: 58
End: 2018-07-20

## 2018-07-20 NOTE — TELEPHONE ENCOUNTER
Nurse Navigator called patient for one final follow up:  Pt doing fine, everything going really well. Just had second PT session yesterday. ROM is 113. Pt has no questions or concerns. Signed off from pt. Instructed pt to call RN or [place] office with any issues or concerns.     Anila Jacinto  Orthopedic Nurse Navigator  Phone number: [phone]

## 2018-07-23 ENCOUNTER — APPOINTMENT (OUTPATIENT)
Dept: PHYSICAL THERAPY | Age: 58
End: 2018-07-23
Payer: COMMERCIAL

## 2018-07-24 ENCOUNTER — HOSPITAL ENCOUNTER (OUTPATIENT)
Dept: PHYSICAL THERAPY | Age: 58
Setting detail: THERAPIES SERIES
Discharge: HOME | End: 2018-07-24
Payer: COMMERCIAL

## 2018-07-24 PROCEDURE — G0283 ELEC STIM OTHER THAN WOUND: HCPCS

## 2018-07-24 PROCEDURE — 97110 THERAPEUTIC EXERCISES: CPT

## 2018-07-24 PROCEDURE — 97016 VASOPNEUMATIC DEVICE THERAPY: CPT

## 2018-07-24 PROCEDURE — 97140 MANUAL THERAPY 1/> REGIONS: CPT

## 2018-07-24 PROCEDURE — 97112 NEUROMUSCULAR REEDUCATION: CPT

## 2018-07-24 NOTE — FLOWSHEET NOTE
control with ambulation re-education including up and down stairs     Home Exercise Program:    [x] (08544) Reviewed/Progressed HEP activities related to strengthening, flexibility, endurance, ROM of core, proximal hip and LE for functional self-care, mobility, lifting and ambulation/stair navigation   [] (62988)Reviewed/Progressed HEP activities related to improving balance, coordination, kinesthetic sense, posture, motor skill, proprioception of core, proximal hip and LE for self care, mobility, lifting, and ambulation/stair navigation      Manual Treatments:  PROM / STM / Oscillations-Mobs:  G-I, II, III, IV (PA's, Inf., Post.)  [] (15511) Provided manual therapy to mobilize LE, proximal hip and/or LS spine soft tissue/joints for the purpose of modulating pain, promoting relaxation,  increasing ROM, reducing/eliminating soft tissue swelling/inflammation/restriction, improving soft tissue extensibility and allowing for proper ROM for normal function with self care, mobility, lifting and ambulation. Modalities:  HV ES/ Gameready Vaso x15 min left knee    Charges:  Timed Code Treatment Minutes: 65   Total Treatment Minutes: 80     [] EVAL (LOW) 60783 (typically 20 minutes face-to-face)  [] EVAL (MOD) 56777 (typically 30 minutes face-to-face)  [] EVAL (HIGH) 81814 (typically 45 minutes face-to-face)  [] RE-EVAL     [x] TL(91772) x  2   [] IONTO  [x] NMR (18119) x  1   [x] VASO  [x] Manual (18531) x  1    [] Other:  [] TA x       [] Mech Traction (32864)  [] ES(attended) (60011)      [x] ES (un) (36380):     GOALS:    Short Term Goals: To be achieved in: 2 weeks  1. Independent in HEP and progression per patient tolerance, in order to prevent re-injury. 2. Patient will have a decrease in pain to facilitate improvement in movement, function, and ADLs as indicated by Functional Deficits. Long Term Goals: To be achieved in: 12 weeks  1.  Disability index score of 35 % or less for the LEFS to assist with reaching

## 2018-07-25 ENCOUNTER — APPOINTMENT (OUTPATIENT)
Dept: PHYSICAL THERAPY | Age: 58
End: 2018-07-25
Payer: COMMERCIAL

## 2018-08-06 ENCOUNTER — OFFICE VISIT (OUTPATIENT)
Dept: ORTHOPEDIC SURGERY | Age: 58
End: 2018-08-06

## 2018-08-06 ENCOUNTER — HOSPITAL ENCOUNTER (OUTPATIENT)
Dept: PHYSICAL THERAPY | Age: 58
Setting detail: THERAPIES SERIES
Discharge: HOME | End: 2018-08-06
Payer: COMMERCIAL

## 2018-08-06 ENCOUNTER — APPOINTMENT (OUTPATIENT)
Dept: PHYSICAL THERAPY | Age: 58
End: 2018-08-06
Payer: COMMERCIAL

## 2018-08-06 VITALS — WEIGHT: 162 LBS | HEIGHT: 60 IN | BODY MASS INDEX: 31.8 KG/M2

## 2018-08-06 DIAGNOSIS — M17.12 PRIMARY OSTEOARTHRITIS OF LEFT KNEE: ICD-10-CM

## 2018-08-06 DIAGNOSIS — Z96.652 S/P TKR (TOTAL KNEE REPLACEMENT) USING CEMENT, LEFT: ICD-10-CM

## 2018-08-06 DIAGNOSIS — M25.562 PAIN IN JOINT OF LEFT KNEE: ICD-10-CM

## 2018-08-06 PROCEDURE — 99024 POSTOP FOLLOW-UP VISIT: CPT | Performed by: PHYSICIAN ASSISTANT

## 2018-08-06 PROCEDURE — G0283 ELEC STIM OTHER THAN WOUND: HCPCS

## 2018-08-06 PROCEDURE — 97110 THERAPEUTIC EXERCISES: CPT

## 2018-08-06 PROCEDURE — 97016 VASOPNEUMATIC DEVICE THERAPY: CPT

## 2018-08-06 PROCEDURE — 97140 MANUAL THERAPY 1/> REGIONS: CPT

## 2018-08-06 PROCEDURE — 97112 NEUROMUSCULAR REEDUCATION: CPT

## 2018-08-06 RX ORDER — HYDROCODONE BITARTRATE AND ACETAMINOPHEN 5; 325 MG/1; MG/1
1 TABLET ORAL EVERY 8 HOURS PRN
Qty: 21 TABLET | Refills: 0 | Status: SHIPPED | OUTPATIENT
Start: 2018-08-06 | End: 2018-09-05 | Stop reason: SDUPTHER

## 2018-08-06 NOTE — FLOWSHEET NOTE
[facility], [address] [place] [address]  Phone: [phone]  Fax [phone]    Physical Therapy Daily Treatment Note  Date:  2018    Patient Name:  Eden Ashton    :  1960  MRN: [id number]  Restrictions/Precautions:  HTN, Anxiety, Depression, H/O Suicide Attempt, Cerebrovascular DX, OA, Migraines, IBS, Obesity, HLD, OA  Medical/Treatment Diagnosis Information:  Diagnosis: E27.991 S/P Left TKR (DOS: 2018),  M25.562 Left Knee Pain,  M17.12 Left Knee OA  Treatment Diagnosis: M25.562 Left Knee Pain, M25.462 Left Knee Edema,  M25.662 Left Knee Stiffness,  M62.81 Left LE Weakness, R26.2 Diffiuclty Walking. Insurance/Certification information:  PT Insurance Information: Payam (BMN, No Auth)  Physician Information:  Referring Practitioner: Dr. Palomo Blount of care signed (Y/N): due 10-8-2018    Date of Patient follow up with Physician:     G-Code (if applicable):      Date G-Code Applied:         Progress Note: []  Yes  [x]  No  Next due by: Visit #10       Latex Allergy:  [x]NO      []YES  Preferred Language for Healthcare:   [x]English       []other:    Visit # Insurance Allowable Requires auth   4 2xwk for 12 weeks    [x]no        []yes:       Pain level:  7/10  Left knee     SUBJECTIVE: Pt states that she just had f/u appointment w/ Dr. Shadi Garnica and that he was very pleased with her progress. Pt states she has not been to PT in the last 2 weeks because she was out of town on vacation. Tried to do some of her HEP while she was away. Did bring her Polar Pac with her and used it daily. Pt states that he left knee has become more painful, stiff, and swollen while she's been away. OBJECTIVE:  6  weeks post-op / last PT rx 2 weeks ago w/ AROM left knee flexion @ 117°. Pt 15 minutes late for appointment. Pt encouraged to increase RX frequency and HEP frequency. Observation: Quad set 3/5. Still not able to perform a SLR. hip, and core control in self care, mobility, lifting, ambulation and eccentric single leg control. NMR and Therapeutic Activities:    [] (39340 or 30272) Provided verbal/tactile cueing for activities related to improving balance, coordination, kinesthetic sense, posture, motor skill, proprioception and motor activation to allow for proper function of core, proximal hip and LE with self care and ADLs  [] (37308) Gait Re-education- Provided training and instruction to the patient for proper LE, core and proximal hip recruitment and positioning and eccentric body weight control with ambulation re-education including up and down stairs     Home Exercise Program:    [x] (78261) Reviewed/Progressed HEP activities related to strengthening, flexibility, endurance, ROM of core, proximal hip and LE for functional self-care, mobility, lifting and ambulation/stair navigation   [] (03722)Reviewed/Progressed HEP activities related to improving balance, coordination, kinesthetic sense, posture, motor skill, proprioception of core, proximal hip and LE for self care, mobility, lifting, and ambulation/stair navigation      Manual Treatments:  PROM / STM / Oscillations-Mobs:  G-I, II, III, IV (PA's, Inf., Post.)  [] (94863) Provided manual therapy to mobilize LE, proximal hip and/or LS spine soft tissue/joints for the purpose of modulating pain, promoting relaxation,  increasing ROM, reducing/eliminating soft tissue swelling/inflammation/restriction, improving soft tissue extensibility and allowing for proper ROM for normal function with self care, mobility, lifting and ambulation.      Modalities:  HV ES/ Gameready Vaso x15 min left knee    Charges:  Timed Code Treatment Minutes: 65   Total Treatment Minutes: 80     [] EVAL (LOW) 63635 (typically 20 minutes face-to-face)  [] EVAL (MOD) 11652 (typically 30 minutes face-to-face)  [] EVAL (HIGH) 02150 (typically 45 minutes face-to-face)  [] RE-EVAL     [x] GQ(87999) x  2   []

## 2018-08-08 ENCOUNTER — APPOINTMENT (OUTPATIENT)
Dept: PHYSICAL THERAPY | Age: 58
End: 2018-08-08
Payer: COMMERCIAL

## 2018-08-10 ENCOUNTER — HOSPITAL ENCOUNTER (OUTPATIENT)
Dept: PHYSICAL THERAPY | Age: 58
Setting detail: THERAPIES SERIES
Discharge: HOME | End: 2018-08-10
Payer: COMMERCIAL

## 2018-08-10 PROCEDURE — G0283 ELEC STIM OTHER THAN WOUND: HCPCS

## 2018-08-10 PROCEDURE — 97140 MANUAL THERAPY 1/> REGIONS: CPT

## 2018-08-10 PROCEDURE — 97110 THERAPEUTIC EXERCISES: CPT

## 2018-08-10 PROCEDURE — 97112 NEUROMUSCULAR REEDUCATION: CPT

## 2018-08-10 PROCEDURE — 97016 VASOPNEUMATIC DEVICE THERAPY: CPT

## 2018-08-10 NOTE — FLOWSHEET NOTE
Orthovitals:   Pt did not return to therapy during weeks 4 & 5 post-op. 8 weeks = 8-         Exercises/Interventions:     Therapeutic Ex Sets/sec Reps Notes   [place]ike [place]ocks to full revol 5 min     Gastroc Stretch on Incline H30x3     Sidestepping @ Half Wall YTB above Knees 2 laps     Seated HS Stretch w/ full knee Ext in chairs H30x3     Standing HR 2x10                 SB Knee Flex Stretch w/ strap H10x10  To 118 hep   SB Bridge H5 2x10  hep   Prone Q set w/ shin into ball H10x10  +hep 8-6   Prone Knee Flex Stretch w/ Strap H10x10     Prone SAH 2# 2x10     SAQ w/ ankle ball squeeze 2#  H5 2x10  hep   SL Hip Abd 0# 2x10     LAQ 1# 2x10           ATC See note  Started 8-   Supine psoas stretch with knee flex with belt  2x10      Manual Intervention      Gentle manual Knee flex ext ROM, HS, and IT Band stretching; STM to quad  20 min  To 117°                                 NMR re-education      GT over black bolsters & w/o AD 8 min  Focus on L knee flex and ext    LSU 4\" 2x10     Air Discs Stride balance @ bar H3-5 2x10     FSU up and over 2\" 2x10     Posterior Disc Slides @ bar 2x10         Therapeutic Exercise and NMR EXR  [x] (13669) Provided verbal/tactile cueing for activities related to strengthening, flexibility, endurance, ROM for improvements in LE, proximal hip, and core control with self care, mobility, lifting, ambulation.  [] (90261) Provided verbal/tactile cueing for activities related to improving balance, coordination, kinesthetic sense, posture, motor skill, proprioception  to assist with LE, proximal hip, and core control in self care, mobility, lifting, ambulation and eccentric single leg control.      NMR and Therapeutic Activities:    [] (45511 or 41583) Provided verbal/tactile cueing for activities related to improving balance, coordination, kinesthetic sense, posture, motor skill, proprioception and motor activation to allow for proper function of core, proximal hip and LE with self care and ADLs  [] (11978) Gait Re-education- Provided training and instruction to the patient for proper LE, core and proximal hip recruitment and positioning and eccentric body weight control with ambulation re-education including up and down stairs     Home Exercise Program:    [x] (99490) Reviewed/Progressed HEP activities related to strengthening, flexibility, endurance, ROM of core, proximal hip and LE for functional self-care, mobility, lifting and ambulation/stair navigation   [] (21463)Reviewed/Progressed HEP activities related to improving balance, coordination, kinesthetic sense, posture, motor skill, proprioception of core, proximal hip and LE for self care, mobility, lifting, and ambulation/stair navigation      Manual Treatments:  PROM / STM / Oscillations-Mobs:  G-I, II, III, IV (PA's, Inf., Post.)  [] (92502) Provided manual therapy to mobilize LE, proximal hip and/or LS spine soft tissue/joints for the purpose of modulating pain, promoting relaxation,  increasing ROM, reducing/eliminating soft tissue swelling/inflammation/restriction, improving soft tissue extensibility and allowing for proper ROM for normal function with self care, mobility, lifting and ambulation. Modalities:  HV ES/ Gameready Vaso x15 min left knee    Charges:  Timed Code Treatment Minutes: 65   Total Treatment Minutes: 80     [] EVAL (LOW) 01879 (typically 20 minutes face-to-face)  [] EVAL (MOD) 11162 (typically 30 minutes face-to-face)  [] EVAL (HIGH) 65500 (typically 45 minutes face-to-face)  [] RE-EVAL     [x] YV(93494) x  2   [] IONTO  [x] NMR (32538) x  1   [x] VASO  [x] Manual (55747) x  1    [] Other:  [] TA x       [] Mech Traction (53962)  [] ES(attended) (64914)      [x] ES (un) (39100):     GOALS:    Short Term Goals: To be achieved in: 2 weeks  1.  Independent in HEP and progression per

## 2018-08-10 NOTE — FLOWSHEET NOTE
[facility], [address] [place]  Phone: [phone]  Fax [phone]      ATHLETIC TRAINING [address]  Date:  8/10/2018    Patient Name:  Hyacinth Balbuena    :  1960  MRN: [id number]  Restrictions/Precautions:    Medical/Treatment Diagnosis Information:  ·  L TKR, L knee pain, OA  ·  L knee pain, edema, stiffness, LLE weakness, difficulty walking  Physician Information:   Dr. Liseth Gao Post-op  8 wks  12 wks 16 wks 20 wks   24 wks                            Activity Log                                                  DOS/DOI:                                                    Date:  8/10/18   ATC communication    Bike    Elliptical    Treadmill    Airdyne        Gastroc stretch    Soleus stretch    Hamstring stretch    ITB stretch    Hip Flexor stretch    Quad stretch    Adductor stretch        Weight Shifting sp                              fp                              tp    Lateral walking (with/w/o TB)        Balance: PEP/Lluvia board                   SLS          Star excursion load/explode          Extremity reach UE/LE        Leg Press Charlie. 60# 2x10                     Ecc.                      Inv. Calf Press Charlie. Ecc.                        Inv.        NUHA   Flex               ABd 30# R/L 2x10              ADd              TKE 30# 20x5\"              Ext        Steps Up               Up and Over               Down               Lateral               Rotation        Squats  mini                  wall                 BOSU         Lunges:  Lunge to Balance                   Balance to Lunge                   Walking        Knee Extension Bilat. Ecc.                               Inv. Hamstring Curls Bilat. Ecc.                               Inv.        Soleus Press Bilat.

## 2018-08-14 ENCOUNTER — HOSPITAL ENCOUNTER (OUTPATIENT)
Dept: PHYSICAL THERAPY | Age: 58
Setting detail: THERAPIES SERIES
Discharge: HOME | End: 2018-08-14
Payer: COMMERCIAL

## 2018-08-14 PROCEDURE — 97140 MANUAL THERAPY 1/> REGIONS: CPT

## 2018-08-14 PROCEDURE — G0283 ELEC STIM OTHER THAN WOUND: HCPCS

## 2018-08-14 PROCEDURE — 97110 THERAPEUTIC EXERCISES: CPT

## 2018-08-14 PROCEDURE — 97016 VASOPNEUMATIC DEVICE THERAPY: CPT

## 2018-08-14 PROCEDURE — 97112 NEUROMUSCULAR REEDUCATION: CPT

## 2018-08-14 NOTE — FLOWSHEET NOTE
rx.    RESTRICTIONS/PRECAUTIONS: Left Knee TKR w/ cement (DOS 6-); hx of CVA                                                              Orthovitals:   Pt did not return to therapy during weeks 4 & 5 post-op. 8 weeks = 8-         Exercises/Interventions:     Therapeutic Ex Sets/sec Reps Notes   [place] to full revol 5 min     Gastroc Stretch on Incline H30x3     Sidestepping @ Half Wall YTB above Knees 2 laps     Seated HS Stretch w/ full knee Ext in chairs H30x3     Standing HR 2x10     SLR w/ Q set 3x5     SB Bridge H5 2x10  hep   Prone Q set w/ shin into ball H10x10  +hep 8-6   Prone Knee Flex Stretch w/ Strap H10x10     Prone SAH 2# 2x10     SAQ w/ ankle ball squeeze Mat's edge 2#  H5 2x10  hep   SL Hip Abd 0# 2x10     LAQ 2# 2x10           ATC Could not stay for ATC today  Started 8-   Supine psoas stretch with knee flex with belt  2x10      Manual Intervention      Gentle manual Knee flex ext ROM, HS, and IT Band stretching; STM to quad  20 min  To 139°                                 NMR re-education      GT w/o AD in clinic 6 min     Airec WS EC x20 each     LSU w/ hip abd 4\" 2x10     Air Discs Stride balance @ bar H3-5 2x10     FSU up and over 4\" 2x10     Posterior Disc Slides @ bar 2x10         Therapeutic Exercise and NMR EXR  [x] (39604) Provided verbal/tactile cueing for activities related to strengthening, flexibility, endurance, ROM for improvements in LE, proximal hip, and core control with self care, mobility, lifting, ambulation.  [] (99451) Provided verbal/tactile cueing for activities related to improving balance, coordination, kinesthetic sense, posture, motor skill, proprioception  to assist with LE, proximal hip, and core control in self care, mobility, lifting, ambulation and eccentric single leg control.      NMR and Therapeutic Activities:    [] (74319 or 31266) Provided verbal/tactile

## 2018-08-16 ENCOUNTER — APPOINTMENT (OUTPATIENT)
Dept: PHYSICAL THERAPY | Age: 58
End: 2018-08-16
Payer: COMMERCIAL

## 2018-08-17 ENCOUNTER — HOSPITAL ENCOUNTER (OUTPATIENT)
Dept: PHYSICAL THERAPY | Age: 58
Setting detail: THERAPIES SERIES
Discharge: HOME | End: 2018-08-17
Payer: COMMERCIAL

## 2018-08-17 PROCEDURE — G0283 ELEC STIM OTHER THAN WOUND: HCPCS

## 2018-08-17 PROCEDURE — 97140 MANUAL THERAPY 1/> REGIONS: CPT

## 2018-08-17 PROCEDURE — 97112 NEUROMUSCULAR REEDUCATION: CPT

## 2018-08-17 PROCEDURE — 97016 VASOPNEUMATIC DEVICE THERAPY: CPT

## 2018-08-17 PROCEDURE — 97110 THERAPEUTIC EXERCISES: CPT

## 2018-08-17 NOTE — FLOWSHEET NOTE
[facility], [address] [place] [address]  Phone: [phone]  Fax [phone]    Physical Therapy Daily Treatment Note  Date:  2018    Patient Name:  Miesha More    :  1960  MRN: [id number]  Restrictions/Precautions:  HTN, Anxiety, Depression, H/O Suicide Attempt, Cerebrovascular DX, OA, Migraines, IBS, Obesity, HLD, OA  Medical/Treatment Diagnosis Information:  Diagnosis: P54.271 S/P Left TKR (DOS: 2018),  M25.562 Left Knee Pain,  M17.12 Left Knee OA  Treatment Diagnosis: M25.562 Left Knee Pain, M25.462 Left Knee Edema,  M25.662 Left Knee Stiffness,  M62.81 Left LE Weakness, R26.2 Diffiuclty Walking. Insurance/Certification information:  PT Insurance Information: Payam (BMN, No Auth)  Physician Information:  Referring Practitioner: Dr. Rojo Organ of care signed (Y/N): due 10-8-2018    Date of Patient follow up with Physician:     G-Code (if applicable):      Date G-Code Applied:         Progress Note: []  Yes  [x]  No  Next due by: Visit #10       Latex Allergy:  [x]NO      []YES  Preferred Language for Healthcare:   [x]English       []other:    Visit # Insurance Allowable Requires auth   7 2xwk for 12 weeks    [x]no        []yes:       Pain level: 3-4 /10  Left knee     SUBJECTIVE: Left knee is feeling better, but still sore. OBJECTIVE:  7.5  weeks post-op / 8 week sNV. Observation: Pt responds well to progressive pressure STM and gentle JM/Stretching to make AROM gains of her left knee. Able to achieve 133° after manual techniques today. Still has some TTP over distal IT Band Insertion. Pt instructed to apply CP to this area BID. Pt required assist for SLR today. Resumed ATC with good tolerance.   Test measurements: 0-133°    RESTRICTIONS/PRECAUTIONS: Left Knee TKR w/ cement (DOS 2018); hx of CVA                                                              Orthovitals:   Pt did not return to therapy during weeks 4 & 5 post-op. 8 weeks = 8-         Exercises/Interventions:     Therapeutic Ex Sets/sec Reps Notes   Bike 5 min     Gastroc Stretch on Incline H30x3     Sidestepping @ Half Wall YTB above Knees 2 laps     Seated HS Stretch w/ full knee Ext in chairs H30x3     Standing HR 2x10     SLR w/ Q set 3x5 w/assist    SB Bridge H5 2x10  hep   Prone Q set w/ shin into ball H10x10  +hep 8-6   Prone Knee Flex Stretch w/ Strap H10x10     Prone SAH 3# 2x10     SAQ w/ ankle ball squeeze Mat's edge 3#  H5 2x10  hep   SL Hip Abd 0# 2x10  +hep 8-   Supine IT Band Stretch w/ strap H15 x5  +hep 98-             ATC See note  Started 8-   Supine psoas stretch with knee flex with belt  2x10      Manual Intervention      Gentle manual Knee flex ext ROM, HS, and IT Band stretching; STM to quad  20 min  To 139°                                 NMR re-education      Stair Training  5 min     Airec WS EC x20 each     LSU w/ hip abd 4\" 2x10     Air Discs Stride balance @ bar H3-5 2x10     FSU up and over 4\" 2x10     Posterior Disc Slides @ bar 2x10         Therapeutic Exercise and NMR EXR  [x] (83585) Provided verbal/tactile cueing for activities related to strengthening, flexibility, endurance, ROM for improvements in LE, proximal hip, and core control with self care, mobility, lifting, ambulation.  [] (98839) Provided verbal/tactile cueing for activities related to improving balance, coordination, kinesthetic sense, posture, motor skill, proprioception  to assist with LE, proximal hip, and core control in self care, mobility, lifting, ambulation and eccentric single leg control.      NMR and Therapeutic Activities:    [] (25255 or 08294) Provided verbal/tactile cueing for activities related to improving balance, coordination, kinesthetic sense, posture, motor skill, proprioception and motor activation to allow for proper function of core, proximal hip and LE with self care and ADLs  [] (35500) Gait Re-education- Provided training and instruction to the patient for proper LE, core and proximal hip recruitment and positioning and eccentric body weight control with ambulation re-education including up and down stairs     Home Exercise Program:    [x] (40651) Reviewed/Progressed HEP activities related to strengthening, flexibility, endurance, ROM of core, proximal hip and LE for functional self-care, mobility, lifting and ambulation/stair navigation   [] (29622)Reviewed/Progressed HEP activities related to improving balance, coordination, kinesthetic sense, posture, motor skill, proprioception of core, proximal hip and LE for self care, mobility, lifting, and ambulation/stair navigation      Manual Treatments:  PROM / STM / Oscillations-Mobs:  G-I, II, III, IV (PA's, Inf., Post.)  [] (23430) Provided manual therapy to mobilize LE, proximal hip and/or LS spine soft tissue/joints for the purpose of modulating pain, promoting relaxation,  increasing ROM, reducing/eliminating soft tissue swelling/inflammation/restriction, improving soft tissue extensibility and allowing for proper ROM for normal function with self care, mobility, lifting and ambulation. Modalities:  HV ES/ Gameready Vaso x15 min left knee    Charges:  Timed Code Treatment Minutes: 60   Total Treatment Minutes: 75     [] EVAL (LOW) 24481 (typically 20 minutes face-to-face)  [] EVAL (MOD) 16190 (typically 30 minutes face-to-face)  [] EVAL (HIGH) 90465 (typically 45 minutes face-to-face)  [] RE-EVAL     [x] NM(02176) x  2   [] IONTO  [x] NMR (20517) x  1   [x] VASO  [x] Manual (78625) x  1    [] Other:  [] TA x       [] Mech Traction (63188)  [] ES(attended) (50296)      [x] ES (un) (67990):     GOALS:    Short Term Goals: To be achieved in: 2 weeks  1. Independent in HEP and progression per patient tolerance, in order to prevent re-injury. Met  2.  Patient will have a decrease in pain to facilitate improvement in movement, function, and ADLs as indicated by Functional Deficits. Met    Long Term Goals: To be achieved in: 12 weeks  1. Disability index score of 35 % or less for the LEFS to assist with reaching prior level of function. 2. Patient will demonstrate increased AROM to 0-120 left knee extension to flexion to allow for proper joint functioning as indicated by patients Functional Deficits. Met 8-6-2018  3. Patient will demonstrate an increase in Strength to good proximal hip strength and control, within 5lb HHD in LE to allow for proper functional mobility as indicated by patients Functional Deficits. Improving  4. Patient will return to ascending and descending a flight of stairs with reciprocal gait and  Touch on handrail without increased symptoms or restriction. Met for ascending stairs 8-  5. Walk with good gait pattern in community w/o AD. Approaching     Progression Towards Functional goals:  [x] Patient is progressing as expected towards functional goals listed. [] Progression is slowed due to complexities listed. [] Progression has been slowed due to co-morbidities. [] Plan just implemented, too soon to assess goals progression  [] Other:     ASSESSMENT:  Still having difficulty w/ SLR.     Treatment/Activity Tolerance:  [x] Patient tolerated treatment well [] Patient limited by fatique  [x] Patient limited by pain  [] Patient limited by other medical complications  [] Other:     Prognosis: [x] Good [] Fair  [] Poor    Patient Requires Follow-up: [x] Yes  [] No    PLAN:   [x] Continue per plan of care [] Alter current plan (see comments)  [] Plan of care initiated [] Hold pending MD visit [] Discharge    Electronically signed by: Robby Patel, [address]

## 2018-08-17 NOTE — FLOWSHEET NOTE
Hamstring Curls Bilat. Ecc.                                Inv.          Soleus Press Bilat. Ecc.                            Inv.                                Ladders                 Square                Jump/Hop  Low                       Med.                       High                                                               Modality ES/GR 15' ES/GR 15'   Initials                             CL[place] EP   Time spent with PT assistant

## 2018-08-20 ENCOUNTER — HOSPITAL ENCOUNTER (OUTPATIENT)
Dept: PHYSICAL THERAPY | Age: 58
Setting detail: THERAPIES SERIES
Discharge: HOME | End: 2018-08-20
Payer: COMMERCIAL

## 2018-08-20 PROCEDURE — 97112 NEUROMUSCULAR REEDUCATION: CPT

## 2018-08-20 PROCEDURE — 97016 VASOPNEUMATIC DEVICE THERAPY: CPT

## 2018-08-20 PROCEDURE — 97140 MANUAL THERAPY 1/> REGIONS: CPT

## 2018-08-20 PROCEDURE — 97110 THERAPEUTIC EXERCISES: CPT

## 2018-08-20 PROCEDURE — G0283 ELEC STIM OTHER THAN WOUND: HCPCS

## 2018-08-20 NOTE — FLOWSHEET NOTE
[facility], [address] [place]  Phone: [phone]  Fax [phone]    Physical Therapy Daily Treatment Note  Date:  2018    Patient Name:  Camilla Khan    :  1960  MRN: [id number]  Restrictions/Precautions:  HTN, Anxiety, Depression, H/O Suicide Attempt, Cerebrovascular DX, OA, Migraines, IBS, Obesity, HLD, OA  Medical/Treatment Diagnosis Information:  Diagnosis: L93.186 S/P Left TKR (DOS: 2018),  M25.562 Left Knee Pain,  M17.12 Left Knee OA  Treatment Diagnosis: M25.562 Left Knee Pain, M25.462 Left Knee Edema,  M25.662 Left Knee Stiffness,  M62.81 Left LE Weakness, R26.2 Diffiuclty Walking. Insurance/Certification information:  PT Insurance Information: [employer]am (BMN, No Auth)  Physician Information:  Referring Practitioner: Dr. Edelmira Haney of care signed (Y/N): due 10-8-2018    Date of Patient follow up with Physician:     G-Code (if applicable):      Date G-Code Applied:         Progress Note: [x]  Yes  []  No  Next due by: Visit #10       Latex Allergy:  [x]NO      []YES  Preferred Language for Healthcare:   [x]English       []other:    Visit # Insurance Allowable Requires auth   8 2xwk for 12 weeks    [x]no        []yes:       Pain level:   5-6/10  Left knee     SUBJECTIVE: Pt states that she has had increase left knee pain since Friday eening after PT and her knee has hurt increasingly all weekend. Has been applying CP several times a day for temporary relief. Feels that working with ATC on equipment is too much for her. OBJECTIVE:  8  weeks post-op / Pt states that she can not complete 8 week Orthovitals today because she is hurting too badly,  Observation: Mild increase edema, but left knee is cool to the touch. Pt continues to wear compression stocking for edema control. Still using SC, but is walking with left knee more stiffly held coming into clinic.   Held 8 week Orthovitals per patient's request.  Held ATC. Pt worked under close supervision with therapist only. Test measurements: 0-133°    RESTRICTIONS/PRECAUTIONS: Left Knee TKR w/ cement (DOS 6-); hx of CVA                                                              Orthovitals:   Pt did not return to therapy during weeks 4 & 5 post-op. Exercises/Interventions:     Therapeutic Ex Sets/sec Reps Notes   Bike 5 min     Gastroc Stretch on Incline H30x3     Sidestepping @ Half Wall YTB above Knees 2 laps     Seated HS Stretch w/ full knee Ext in chairs H30x3     Standing HR 2x10     SLR w/ Q set 4x5 W/ mod assist    SB Bridge H5 2x10  hep   Prone Q set w/ shin into ball H10x10  +hep 8-6   Prone Knee Flex Stretch w/ Strap H10x10     Prone SAH 2.5# 2x10     SAQ w/ ankle ball squeeze Mat's edge 2.5#  H5 2x10  hep   SL Hip Abd 0# 2x10  +hep 8-   Supine IT Band Stretch w/ strap H15 x5  +hep 98-             ATC Hold ATC    Started 8-      Manual Intervention      Gentle manual Knee flex ext ROM, HS, and IT Band stretching; STM to quad  20 min  To 139°                                 NMR re-education      SLS Cone Reach Matrix 5 sets     LSU w/ hip abd 4\" 2x10     Air Discs Stride balance @ bar H3-5 2x10     FSU up and over 4\" 2x10     Posterior Disc Slides @ bar 2x10         Therapeutic Exercise and NMR EXR  [x] (62337) Provided verbal/tactile cueing for activities related to strengthening, flexibility, endurance, ROM for improvements in LE, proximal hip, and core control with self care, mobility, lifting, ambulation.  [] (14180) Provided verbal/tactile cueing for activities related to improving balance, coordination, kinesthetic sense, posture, motor skill, proprioception  to assist with LE, proximal hip, and core control in self care, mobility, lifting, ambulation and eccentric single leg control.      NMR and Therapeutic Activities:    [] (18651 or ) Provided verbal/tactile cueing for activities related to improving balance, coordination, kinesthetic sense, posture, motor skill, proprioception and motor activation to allow for proper function of core, proximal hip and LE with self care and ADLs  [] (04216) Gait Re-education- Provided training and instruction to the patient for proper LE, core and proximal hip recruitment and positioning and eccentric body weight control with ambulation re-education including up and down stairs     Home Exercise Program:    [x] (07449) Reviewed/Progressed HEP activities related to strengthening, flexibility, endurance, ROM of core, proximal hip and LE for functional self-care, mobility, lifting and ambulation/stair navigation   [] (37907)Reviewed/Progressed HEP activities related to improving balance, coordination, kinesthetic sense, posture, motor skill, proprioception of core, proximal hip and LE for self care, mobility, lifting, and ambulation/stair navigation      Manual Treatments:  PROM / STM / Oscillations-Mobs:  G-I, II, III, IV (PA's, Inf., Post.)  [] (57352) Provided manual therapy to mobilize LE, proximal hip and/or LS spine soft tissue/joints for the purpose of modulating pain, promoting relaxation,  increasing ROM, reducing/eliminating soft tissue swelling/inflammation/restriction, improving soft tissue extensibility and allowing for proper ROM for normal function with self care, mobility, lifting and ambulation.      Modalities:  HV ES/ Gameready Vaso x15 min left knee    Charges:  Timed Code Treatment Minutes: 60   Total Treatment Minutes: 75     [] EVAL (LOW) 84345 (typically 20 minutes face-to-face)  [] EVAL (MOD) 40507 (typically 30 minutes face-to-face)  [] EVAL (HIGH) 85100 (typically 45 minutes face-to-face)  [] RE-EVAL     [x] IN(19044) x  2   [] IONTO  [x] NMR (20171) x  1   [x] VASO  [x] Manual (27246) x  1    [] Other:  [] TA x       [] Mech Traction (06675)  [] ES(attended) (32864) Discharge    Electronically signed by: Librado Wilkerson, [address]

## 2018-08-24 ENCOUNTER — HOSPITAL ENCOUNTER (OUTPATIENT)
Dept: PHYSICAL THERAPY | Age: 58
Setting detail: THERAPIES SERIES
Discharge: HOME | End: 2018-08-24
Payer: COMMERCIAL

## 2018-08-24 PROCEDURE — 97110 THERAPEUTIC EXERCISES: CPT

## 2018-08-24 PROCEDURE — 97112 NEUROMUSCULAR REEDUCATION: CPT

## 2018-08-24 PROCEDURE — 97140 MANUAL THERAPY 1/> REGIONS: CPT

## 2018-08-24 PROCEDURE — 97016 VASOPNEUMATIC DEVICE THERAPY: CPT

## 2018-08-24 PROCEDURE — G0283 ELEC STIM OTHER THAN WOUND: HCPCS

## 2018-08-24 NOTE — FLOWSHEET NOTE
LE with self care and ADLs  [] (80084) Gait Re-education- Provided training and instruction to the patient for proper LE, core and proximal hip recruitment and positioning and eccentric body weight control with ambulation re-education including up and down stairs     Home Exercise Program:    [x] (14550) Reviewed/Progressed HEP activities related to strengthening, flexibility, endurance, ROM of core, proximal hip and LE for functional self-care, mobility, lifting and ambulation/stair navigation   [] (17370)Reviewed/Progressed HEP activities related to improving balance, coordination, kinesthetic sense, posture, motor skill, proprioception of core, proximal hip and LE for self care, mobility, lifting, and ambulation/stair navigation      Manual Treatments:  PROM / STM / Oscillations-Mobs:  G-I, II, III, IV (PA's, Inf., Post.)  [] (26890) Provided manual therapy to mobilize LE, proximal hip and/or LS spine soft tissue/joints for the purpose of modulating pain, promoting relaxation,  increasing ROM, reducing/eliminating soft tissue swelling/inflammation/restriction, improving soft tissue extensibility and allowing for proper ROM for normal function with self care, mobility, lifting and ambulation. Modalities:  HV ES/ Gameready Vaso x15 min left knee    Charges:  Timed Code Treatment Minutes: 60   Total Treatment Minutes: 75     [] EVAL (LOW) 56543 (typically 20 minutes face-to-face)  [] EVAL (MOD) 62113 (typically 30 minutes face-to-face)  [] EVAL (HIGH) 71927 (typically 45 minutes face-to-face)  [] RE-EVAL     [x] EL(70342) x  2   [] IONTO  [x] NMR (82391) x  1   [x] VASO  [x] Manual (30385) x  1    [] Other:  [] TA x       [] Mech Traction (90681)  [] ES(attended) (55121)      [x] ES (un) (15766):     GOALS:    Short Term Goals: To be achieved in: 2 weeks  1. Independent in HEP and progression per patient tolerance, in order to prevent re-injury. Met  2.  Patient will have a decrease in pain to facilitate

## 2018-08-28 ENCOUNTER — HOSPITAL ENCOUNTER (OUTPATIENT)
Dept: PHYSICAL THERAPY | Age: 58
Setting detail: THERAPIES SERIES
Discharge: HOME | End: 2018-08-28
Payer: COMMERCIAL

## 2018-08-28 PROCEDURE — G0283 ELEC STIM OTHER THAN WOUND: HCPCS

## 2018-08-28 PROCEDURE — 97112 NEUROMUSCULAR REEDUCATION: CPT

## 2018-08-28 PROCEDURE — G8979 MOBILITY GOAL STATUS: HCPCS

## 2018-08-28 PROCEDURE — G8978 MOBILITY CURRENT STATUS: HCPCS

## 2018-08-28 PROCEDURE — 97110 THERAPEUTIC EXERCISES: CPT

## 2018-08-28 PROCEDURE — 97016 VASOPNEUMATIC DEVICE THERAPY: CPT

## 2018-08-28 PROCEDURE — 97140 MANUAL THERAPY 1/> REGIONS: CPT

## 2018-08-28 NOTE — PLAN OF CARE
[facility],  [address]     Physical Therapy POC/10th Visit Progress Note    Date: 2018        Patient Name:  Myah Claudio    :  1960  MRN: [id number]  Referring Physician: Dr. Zenaida Ackerman  Diagnosis:     T12.624 S/P Left TKR (DOS: 2018),  M25.562 Left Knee Pain,  M17.12 Left Knee OA                 ICD Code: I21.329, M25.562, M17.12  Therapy Diagnosis/Practice Pattern: 4H     Total number of visits: 10  Reporting Period:   Beginning Date: 2018   End Date: 2018    OBJECTIVE  Test used Initial score Current Score   Pain Summary 0-10 4-6/10 3-5/10   Functional questionnaire LEFS 68% 60%   Functional Testing Single Leg Stance Left LE 0 sec 10 sec    TUG  10 sec w/ SC 8 sec no AD         ROM  Left Knee Extension -5° 0    Flexion 91° 133         Strength  Left Knee SLR -3 4-    Quad Tone -3 4-    Ext 3+ 4    Flex 3+ 4+    Eccentric Quad  4- to 4        Functional Limitation G-Code (if applicable):         PT G-Codes  Functional Assessment Tool Used: LEFS  Score: 60%  Functional Limitation: Mobility: Walking and moving around  Mobility: Walking and Moving Around Current Status (): At least 60 percent but less than 80 percent impaired, limited or restricted  Mobility: Walking and Moving Around Goal Status (): At least 20 percent but less than 40 percent impaired, limited or restricted   Test/tests used to determine % limitation: LEFS  Actual Score used to drive % limitation: 19%    Treatment to date:  [x] Therapeutic Exercise    [x] Modalities:  [] Therapeutic Activity             []Ultrasound            [x]Electrical Stimulation  [x] Gait Training     []Cervical Traction    [] Lumbar Traction  [x] Neuromuscular Re-education [x] Cold/hotpack         []Iontophoresis  [x] Instruction in HEP      Other:  [x] Manual Therapy                   [x]V-Comp                       ? []  Assessment:  [x] Improvement noted relative to goals:  1. Disability index score of 35 % or less for the LEFS to assist with reaching prior level of function. To 60% 8-  2. Patient will demonstrate increased AROM to 0-120 left knee extension to flexion to allow for proper joint functioning as indicated by patients Functional Deficits. Met 8-6-2018  3. Patient will demonstrate an increase in Strength to good proximal hip strength and control, within 5lb HHD in LE to allow for proper functional mobility as indicated by patients Functional Deficits. Improving  4. Patient will return to ascending and descending a flight of stairs with reciprocal gait and  Touch on handrail without increased symptoms or restriction. Met 8-  5. Walk with good gait pattern in community w/o AD. Ambulates in clinic w/o AD 8-, SC in community    [] No Improvement noted related to goals:/Patient's response to treatment/Additional assessment:    New or Updated Goals (if applicable):  [x] No change to goals established upon initial eval/last progress note:    Cont PT 2xwk for 4 additional weeks working towards LTG's.     Electronically signed by:  Alexsandra Goldberg, [address]

## 2018-08-28 NOTE — FLOWSHEET NOTE
Therapeutic Activities:    [] (51359 or 40832) Provided verbal/tactile cueing for activities related to improving balance, coordination, kinesthetic sense, posture, motor skill, proprioception and motor activation to allow for proper function of core, proximal hip and LE with self care and ADLs  [] (27133) Gait Re-education- Provided training and instruction to the patient for proper LE, core and proximal hip recruitment and positioning and eccentric body weight control with ambulation re-education including up and down stairs     Home Exercise Program:    [x] (17250) Reviewed/Progressed HEP activities related to strengthening, flexibility, endurance, ROM of core, proximal hip and LE for functional self-care, mobility, lifting and ambulation/stair navigation   [] (58427)Reviewed/Progressed HEP activities related to improving balance, coordination, kinesthetic sense, posture, motor skill, proprioception of core, proximal hip and LE for self care, mobility, lifting, and ambulation/stair navigation      Manual Treatments:  PROM / STM / Oscillations-Mobs:  G-I, II, III, IV (PA's, Inf., Post.)  [] (11081) Provided manual therapy to mobilize LE, proximal hip and/or LS spine soft tissue/joints for the purpose of modulating pain, promoting relaxation,  increasing ROM, reducing/eliminating soft tissue swelling/inflammation/restriction, improving soft tissue extensibility and allowing for proper ROM for normal function with self care, mobility, lifting and ambulation.      Modalities:  HV ES/ Gameready Vaso x15 min left knee    Charges:  Timed Code Treatment Minutes: 50   Total Treatment Minutes: 65     [] EVAL (LOW) 96482 (typically 20 minutes face-to-face)  [] EVAL (MOD) 33429 (typically 30 minutes face-to-face)  [] EVAL (HIGH) 55040 (typically 45 minutes face-to-face)  [] RE-EVAL     [] EX(89719) x  1   [] IONTO  [x] NMR (68503) x  1   [x] VASO  [x] Manual (53840) x  1    [] Other:  [] TA x       [] Mech Traction (35624)  [] ES(attended) (91460)      [x] ES (un) (12800):     GOALS:    Short Term Goals: To be achieved in: 2 weeks  1. Independent in HEP and progression per patient tolerance, in order to prevent re-injury. Met  2. Patient will have a decrease in pain to facilitate improvement in movement, function, and ADLs as indicated by Functional Deficits. Met    Long Term Goals: To be achieved in: 12 weeks  1. Disability index score of 35 % or less for the LEFS to assist with reaching prior level of function. To 60% 8-  2. Patient will demonstrate increased AROM to 0-120 left knee extension to flexion to allow for proper joint functioning as indicated by patients Functional Deficits. Met 8-6-2018  3. Patient will demonstrate an increase in Strength to good proximal hip strength and control, within 5lb HHD in LE to allow for proper functional mobility as indicated by patients Functional Deficits. Improving  4. Patient will return to ascending and descending a flight of stairs with reciprocal gait and  Touch on handrail without increased symptoms or restriction. Met 8-  5. Walk with good gait pattern in community w/o AD. Ambulates in clinic w/o AD 8-, SC in community. Progression Towards Functional goals:  [x] Patient is progressing as expected towards functional goals listed. [] Progression is slowed due to complexities listed. [] Progression has been slowed due to co-morbidities. [] Plan just implemented, too soon to assess goals progression  [] Other:     ASSESSMENT: Pt demonstrates significant increase in functional abilities in clinic, which does not correspond to pt's functional assessment score on LEFS.      Treatment/Activity Tolerance:  [x] Patient tolerated treatment well [] Patient limited by fatique  [x] Patient limited by pain  [] Patient limited by other medical complications  [] Other:     Prognosis: [x] Good [] Fair  [] Poor    Patient Requires Follow-up: [x] Yes  [] No    PLAN:   [x] Continue per plan of care [] Alter current plan (see comments)  [] Plan of care initiated [] Hold pending MD visit [] Discharge    Electronically signed by: Guerita Burger, [address]

## 2018-08-28 NOTE — FLOWSHEET NOTE
Walking            Knee Extension Bilat. Ecc.                                 Inv. Hamstring Curls Bilat. Ecc.                                 Inv.            Soleus Press Bilat. Ecc.                             Inv.                                   Ladders                  Square                 Jump/Hop  Low                        Med.                        High                                                                  Modality ES/GR 15' ES/GR 15' ES/GR 15'   Initials                             JERICHOW EP EP   Time spent with PT assistant

## 2018-08-31 ENCOUNTER — HOSPITAL ENCOUNTER (OUTPATIENT)
Dept: PHYSICAL THERAPY | Age: 58
Setting detail: THERAPIES SERIES
Discharge: HOME | End: 2018-08-31
Payer: COMMERCIAL

## 2018-08-31 PROCEDURE — 97110 THERAPEUTIC EXERCISES: CPT

## 2018-08-31 PROCEDURE — G0283 ELEC STIM OTHER THAN WOUND: HCPCS

## 2018-08-31 PROCEDURE — 97112 NEUROMUSCULAR REEDUCATION: CPT

## 2018-08-31 PROCEDURE — 97140 MANUAL THERAPY 1/> REGIONS: CPT

## 2018-08-31 NOTE — FLOWSHEET NOTE
[facility]               Lunges:  Lunge to Balance                      Balance to Lunge                      Walking              Knee Extension Bilat. Ecc.                                  Inv. Hamstring Curls Bilat. Ecc.                                  Inv.              Soleus Press Bilat. Ecc.                              Inv.                                      Ladders                   Square                  Jump/Hop  Low                         Med.                         High                                                                     Modality ES/GR 15' ES/GR 15' ES/GR 15' ES/CP 15'   Initials                             JLW EP EP EP   Time spent with PT assistant

## 2018-08-31 NOTE — FLOWSHEET NOTE
return to therapy during weeks 4 & 5 post-op. Exercises/Interventions:     Therapeutic Ex Sets/sec Reps Notes   Bike 5 min     Gastroc Stretch on Incline H30x3     Sidestepping @ Half Wall YTB above Knees 2 laps     Seated HS Jose David Walks 2 lengths     Seated HS Stretch w/ full knee Ext in chairs H30x3     Standing HR 2x10     SLR w/ Q set 2x10     Bridge w/ NJ H5 2x10  hep   Prone Q set w/ shin into ball H10x10  +hep 8-6   Prone Knee Flex Stretch w/ Strap H10x10     Prone SAH 3# 2x10     SAQ w/ ankle ball squeeze Mat's edge 3#  H5 2x10  hep   SL Hip Abd 0# 2x10  +hep 8-   Supine IT Band Stretch w/ strap H15 x5  +hep 98-             ATC See note    Started 8-      Manual Intervention      Gentle manual Knee flex ext ROM, HS, and IT Band stretching; STM to quad  20 min  To 139°                                 NMR re-education      Trainer stair training 6 min     SLS Cone Reach Matrix 5 sets     Airex on 4\" stepLSU w/ hip abd 3x10 ea         FSU up and over 6\" 2x10     Posterior Disc Slides @ bar 3x10         Therapeutic Exercise and NMR EXR  [x] (30628) Provided verbal/tactile cueing for activities related to strengthening, flexibility, endurance, ROM for improvements in LE, proximal hip, and core control with self care, mobility, lifting, ambulation.  [] (87230) Provided verbal/tactile cueing for activities related to improving balance, coordination, kinesthetic sense, posture, motor skill, proprioception  to assist with LE, proximal hip, and core control in self care, mobility, lifting, ambulation and eccentric single leg control.      NMR and Therapeutic Activities:    [] (29809 or 76608) Provided verbal/tactile cueing for activities related to improving balance, coordination, kinesthetic sense, posture, motor skill, proprioception and motor activation to allow for proper function of core, proximal hip and LE

## 2018-09-04 ENCOUNTER — HOSPITAL ENCOUNTER (OUTPATIENT)
Dept: PHYSICAL THERAPY | Age: 58
Setting detail: THERAPIES SERIES
Discharge: HOME | End: 2018-09-04
Payer: COMMERCIAL

## 2018-09-04 PROCEDURE — 97110 THERAPEUTIC EXERCISES: CPT

## 2018-09-04 PROCEDURE — 97016 VASOPNEUMATIC DEVICE THERAPY: CPT

## 2018-09-04 PROCEDURE — 97140 MANUAL THERAPY 1/> REGIONS: CPT

## 2018-09-04 PROCEDURE — G0283 ELEC STIM OTHER THAN WOUND: HCPCS

## 2018-09-04 NOTE — FLOWSHEET NOTE
[facility], [address]  Phone: [phone]  Fax [phone]    Physical Therapy Daily Treatment Note  Date:  2018    Patient Name:  Petey Carrillo    :  1960  MRN: [id number]  Restrictions/Precautions:  HTN, Anxiety, Depression, H/O Suicide Attempt, Cerebrovascular DX, OA, Migraines, IBS, Obesity, HLD, OA  Medical/Treatment Diagnosis Information:  Diagnosis: U80.869 S/P Left TKR (DOS: 2018),  M25.562 Left Knee Pain,  M17.12 Left Knee OA  Treatment Diagnosis: M25.562 Left Knee Pain, M25.462 Left Knee Edema,  M25.662 Left Knee Stiffness,  M62.81 Left LE Weakness, R26.2 Diffiuclty Walking. Insurance/Certification information:  PT Insurance Information: [employer]am (BMN, No Auth)  Physician Information:  Referring Practitioner: Dr. Horowitz Door of care signed (Y/N): due 10-8-2018    Date of Patient follow up with Physician:     G-Code (if applicable):      Date G-Code Applied:         Progress Note: [x]  Yes  []  No  Next due by: Visit #10       Latex Allergy:  [x]NO      []YES  Preferred Language for Healthcare:   [x]English       []other:    Visit # Insurance Allowable Requires auth   12  6 more 2xwk for 12 weeks    [x]no        []yes:       Pain level:   5-6/10 Left knee     SUBJECTIVE: Pt states that she was up and down and walking around a lot over the holiday weekend @ social function. Reports marked increase in posterior left knee pain over the weekend. \"I haven't hurt that bad in the back of my knee since right after surgery. \"  Pt states that she spent extended periods of time resting and applying CP to her left knee yesterday, which has helped decrease some of this pain and edema. Will Have F/U appointment tomorrow with Dr. Axel Muir. OBJECTIVE:  10 weeks post-op. See Op note. 218: New order received to cont PT additional 2xwk for 4 weeks.   Observation:   Pt enters clinic relying more on her SC. related to improving balance, coordination, kinesthetic sense, posture, motor skill, proprioception and motor activation to allow for proper function of core, proximal hip and LE with self care and ADLs  [] (71389) Gait Re-education- Provided training and instruction to the patient for proper LE, core and proximal hip recruitment and positioning and eccentric body weight control with ambulation re-education including up and down stairs     Home Exercise Program:    [x] (71002) Reviewed/Progressed HEP activities related to strengthening, flexibility, endurance, ROM of core, proximal hip and LE for functional self-care, mobility, lifting and ambulation/stair navigation   [] (52424)Reviewed/Progressed HEP activities related to improving balance, coordination, kinesthetic sense, posture, motor skill, proprioception of core, proximal hip and LE for self care, mobility, lifting, and ambulation/stair navigation      Manual Treatments:  PROM / STM / Oscillations-Mobs:  G-I, II, III, IV (PA's, Inf., Post.)  [] (80799) Provided manual therapy to mobilize LE, proximal hip and/or LS spine soft tissue/joints for the purpose of modulating pain, promoting relaxation,  increasing ROM, reducing/eliminating soft tissue swelling/inflammation/restriction, improving soft tissue extensibility and allowing for proper ROM for normal function with self care, mobility, lifting and ambulation. Modalities:  HV ES/ Gameready Vaso x15 min left knee    Charges:  Timed Code Treatment Minutes: 45   Total Treatment Minutes: 60     [] EVAL (LOW) 03310 (typically 20 minutes face-to-face)  [] EVAL (MOD) 45088 (typically 30 minutes face-to-face)  [] EVAL (HIGH) 26290 (typically 45 minutes face-to-face)  [] RE-EVAL     [x] WA(11217) x  2   [] IONTO  [] NMR (48124) x      [] VASO  [x] Manual (70538) x  1    [] Other:  [] TA x       [] Mech Traction (47016)  [] ES(attended) (81390)      [x] ES (un) (17512):     GOALS:    Short Term Goals:  To be achieved in: 2 weeks  1. Independent in HEP and progression per patient tolerance, in order to prevent re-injury. Met  2. Patient will have a decrease in pain to facilitate improvement in movement, function, and ADLs as indicated by Functional Deficits. Met    Long Term Goals: To be achieved in: 12 weeks  1. Disability index score of 35 % or less for the LEFS to assist with reaching prior level of function. To 60% 8-  2. Patient will demonstrate increased AROM to 0-120 left knee extension to flexion to allow for proper joint functioning as indicated by patients Functional Deficits. Met 8-6-2018  3. Patient will demonstrate an increase in Strength to good proximal hip strength and control, within 5lb HHD in LE to allow for proper functional mobility as indicated by patients Functional Deficits. Improving  4. Patient will return to ascending and descending a flight of stairs with reciprocal gait and  Touch on handrail without increased symptoms or restriction. Met 8-  5. Walk with good gait pattern in community w/o AD. Ambulates in clinic w/o AD 8-, SC in community. Progression Towards Functional goals:  [x] Patient is progressing as expected towards functional goals listed. [] Progression is slowed due to complexities listed. [] Progression has been slowed due to co-morbidities. [] Plan just implemented, too soon to assess goals progression  [] Other:     ASSESSMENT: Increase pain and edema in left knee when pt attempted to resume community distance walking & standing over the weekend.     Treatment/Activity Tolerance:  [x] Patient tolerated treatment well [] Patient limited by fatique  [x] Patient limited by pain  [] Patient limited by other medical complications  [] Other:     Prognosis: [x] Good [] Fair  [] Poor    Patient Requires Follow-up: [x] Yes  [] No    PLAN:   [x] Continue per plan of care [] Alter current plan (see comments)  [] Plan of care initiated [] Hold pending MD visit [] Discharge    Electronically signed by: Ramone Oconnell, [address]

## 2018-09-04 NOTE — OP NOTE
as expected for this condition: ROM/strength/gait   [x] Patient is progressing, but slower than expected for this condition: limited progress in LEFS sscore even with obvious gains being made to functional ability, strength, and gait. [] Patient is not progressing  Clinician Recommendations:   [x] Continue rehabilitation due to objective improvement and continued functional deficits. [] Follow up periodically to advance home exercise program to match level of function. [] Continue rehabitation due to objective improvement and continued functional deficits with progression to work conditioning. [] Discharge to post-rehab program secondary to maximizing \"medical necessity\" of physical / occupational therapy. [] Discharge independent in a home program as:  [] All goals achieved  [] Maximized \"medical necessity\" of physical / occupational therapy  [] No subjective or objective improvements    Plan: Cont PT 2xweek working towards LTG's.   Electronically signed by: Shanon Shields PT   License #: [id number]     Physician Recommendations:  [] Follow treatment plan as above [] Discontinue physical therapy  [] Change plan to: _______________________________________________________________

## 2018-09-05 ENCOUNTER — OFFICE VISIT (OUTPATIENT)
Dept: ORTHOPEDIC SURGERY | Age: 58
End: 2018-09-05

## 2018-09-05 VITALS — BODY MASS INDEX: 31.8 KG/M2 | HEIGHT: 60 IN | WEIGHT: 162 LBS

## 2018-09-05 DIAGNOSIS — M17.12 PRIMARY OSTEOARTHRITIS OF LEFT KNEE: ICD-10-CM

## 2018-09-05 DIAGNOSIS — Z96.652 S/P TKR (TOTAL KNEE REPLACEMENT) USING CEMENT, LEFT: ICD-10-CM

## 2018-09-05 DIAGNOSIS — M25.562 PAIN IN JOINT OF LEFT KNEE: ICD-10-CM

## 2018-09-05 PROCEDURE — 99024 POSTOP FOLLOW-UP VISIT: CPT | Performed by: PHYSICIAN ASSISTANT

## 2018-09-05 RX ORDER — CLINDAMYCIN HYDROCHLORIDE 150 MG/1
150 CAPSULE ORAL ONCE
Qty: 4 CAPSULE | Refills: 0 | Status: SHIPPED | OUTPATIENT
Start: 2018-09-05 | End: 2018-09-05

## 2018-09-05 RX ORDER — HYDROCODONE BITARTRATE AND ACETAMINOPHEN 5; 325 MG/1; MG/1
1 TABLET ORAL EVERY 6 HOURS PRN
Qty: 28 TABLET | Refills: 0 | Status: SHIPPED | OUTPATIENT
Start: 2018-09-05 | End: 2018-09-12

## 2018-09-07 ENCOUNTER — HOSPITAL ENCOUNTER (OUTPATIENT)
Dept: PHYSICAL THERAPY | Age: 58
Setting detail: THERAPIES SERIES
Discharge: HOME | End: 2018-09-07
Payer: COMMERCIAL

## 2018-09-07 PROCEDURE — 97140 MANUAL THERAPY 1/> REGIONS: CPT

## 2018-09-07 PROCEDURE — G0283 ELEC STIM OTHER THAN WOUND: HCPCS

## 2018-09-07 PROCEDURE — 97016 VASOPNEUMATIC DEVICE THERAPY: CPT

## 2018-09-07 PROCEDURE — 97110 THERAPEUTIC EXERCISES: CPT

## 2018-09-07 NOTE — FLOWSHEET NOTE
proximal hip recruitment and positioning and eccentric body weight control with ambulation re-education including up and down stairs     Home Exercise Program:    [x] (89008) Reviewed/Progressed HEP activities related to strengthening, flexibility, endurance, ROM of core, proximal hip and LE for functional self-care, mobility, lifting and ambulation/stair navigation   [] (60975)Reviewed/Progressed HEP activities related to improving balance, coordination, kinesthetic sense, posture, motor skill, proprioception of core, proximal hip and LE for self care, mobility, lifting, and ambulation/stair navigation      Manual Treatments:  PROM / STM / Oscillations-Mobs:  G-I, II, III, IV (PA's, Inf., Post.)  [] (45008) Provided manual therapy to mobilize LE, proximal hip and/or LS spine soft tissue/joints for the purpose of modulating pain, promoting relaxation,  increasing ROM, reducing/eliminating soft tissue swelling/inflammation/restriction, improving soft tissue extensibility and allowing for proper ROM for normal function with self care, mobility, lifting and ambulation. Modalities:  HV ES/ Gameready Vaso x15 min left knee    Charges:  Timed Code Treatment Minutes: 45   Total Treatment Minutes: 60     [] EVAL (LOW) 68484 (typically 20 minutes face-to-face)  [] EVAL (MOD) 26010 (typically 30 minutes face-to-face)  [] EVAL (HIGH) 16409 (typically 45 minutes face-to-face)  [] RE-EVAL     [x] DW(36578) x  2   [] IONTO  [] NMR (17623) x      [] VASO  [x] Manual (61819) x  1    [] Other:  [] TA x       [] Mech Traction (69813)  [] ES(attended) (51694)      [x] ES (un) (16285):     GOALS:    Short Term Goals: To be achieved in: 2 weeks  1. Independent in HEP and progression per patient tolerance, in order to prevent re-injury. Met  2. Patient will have a decrease in pain to facilitate improvement in movement, function, and ADLs as indicated by Functional Deficits. Met    Long Term Goals: To be achieved in: 12 weeks  1.

## 2018-09-10 ENCOUNTER — HOSPITAL ENCOUNTER (OUTPATIENT)
Dept: PHYSICAL THERAPY | Age: 58
Setting detail: THERAPIES SERIES
Discharge: HOME | End: 2018-09-10
Payer: COMMERCIAL

## 2018-09-10 ENCOUNTER — APPOINTMENT (OUTPATIENT)
Dept: PHYSICAL THERAPY | Age: 58
End: 2018-09-10
Payer: COMMERCIAL

## 2018-09-10 PROCEDURE — 97110 THERAPEUTIC EXERCISES: CPT

## 2018-09-10 PROCEDURE — 97016 VASOPNEUMATIC DEVICE THERAPY: CPT

## 2018-09-10 PROCEDURE — G0283 ELEC STIM OTHER THAN WOUND: HCPCS

## 2018-09-10 PROCEDURE — 97140 MANUAL THERAPY 1/> REGIONS: CPT

## 2018-09-10 PROCEDURE — 97112 NEUROMUSCULAR REEDUCATION: CPT

## 2018-09-10 NOTE — FLOWSHEET NOTE
[facility], [address] [place] [address]  Phone: [phone]  Fax [phone]    Physical Therapy Daily Treatment Note  Date:  9/10/2018    Patient Name:  Lida Boss    :  1960  MRN: [id number]  Restrictions/Precautions:  HTN, Anxiety, Depression, H/O Suicide Attempt, Cerebrovascular DX, OA, Migraines, IBS, Obesity, HLD, OA  Medical/Treatment Diagnosis Information:  Diagnosis: S42.295 S/P Left TKR (DOS: 2018),  M25.562 Left Knee Pain,  M17.12 Left Knee OA  Treatment Diagnosis: M25.562 Left Knee Pain, M25.462 Left Knee Edema,  M25.662 Left Knee Stiffness,  M62.81 Left LE Weakness, R26.2 Diffiuclty Walking. Insurance/Certification information:  PT Insurance Information: Payam (BMN, No Auth)  Physician Information:  Referring Practitioner: Dr. Mian Young of care signed (Y/N): due 10-8-2018    Date of Patient follow up with Physician:     G-Code (if applicable):      Date G-Code Applied:         Progress Note: []  Yes  [x]  No  Next due by: Visit #10       Latex Allergy:  [x]NO      []YES  Preferred Language for Healthcare:   [x]English       []other:    Visit # Insurance Allowable Requires auth   14   2xwk for 12 weeks    [x]no        []yes:       Pain level:  2 /10 Left knee     SUBJECTIVE: Took it easy over the weekend and her left knee is feeling better, but still having some pain in back of her knee. OBJECTIVE:  11 weeks post-op. 2018: New order received to cont PT additional 2xwk for 4 weeks. Observation:  TTP proximal gastroc. Pt not able to perform single heel raise today without pain. Discomfort in proximal gastroc relieved with rolling/STM/gentle stretching. Added standing HR to HEP. Able to resume closed chain TE and ATC with good tolerance.   Test measurements:       RESTRICTIONS/PRECAUTIONS: Left Knee TKR w/ cement (DOS 2018); hx of CVA Exercises/Interventions:     Therapeutic Ex Sets/sec Reps Notes   Bike 5 min     Gastroc Stretch on Incline H30x3     Sidestepping @ Half Wall YTB above Knees 2 laps     Seated HS Stool Walks 2 lengths     Seated HS Stretch w/ full knee Ext in chairs H30x3     Standing HR 2x10  +hep 9-   SLR w/ Q set 3x10     Bridge w/ knee flex pull ins 3x5     Prone Q set w/ shin into ball H10x10  +hep 8-6   Prone Knee Flex Stretch w/ Strap H10x10     Prone SAH 3# 2x10     Prone Ankle PF BTB 2x10     SAQ w/ ankle ball squeeze Mat's edge 3#  H5 2x10  hep   SL Hip Abd 1# 2x10  +hep 8-   Supine IT Band Stretch w/ strap H15 x5  +hep 98-             ATC Resumed today. See note    Started 8-   Supine psoas stretch with knee flex with belt  2x10     Manual Intervention      Gentle manual Knee flex ext ROM, HS, and IT Band stretching; Rolling/STMgentle stretching to calf/quad/IT band 20 min  To 135°                                 NMR re-education          Half Bolster Stride Balance H5x10     SLS Cone Reach Matrix High mat 2 sets     Airex on 4\" step LSU 2x10     Air Discs Stride balance @ bar H3-5 2x10     FSU up and over 6\" 2x10     Posterior Disc Slides @ bar 3x10         Therapeutic Exercise and NMR EXR  [x] (72827) Provided verbal/tactile cueing for activities related to strengthening, flexibility, endurance, ROM for improvements in LE, proximal hip, and core control with self care, mobility, lifting, ambulation.  [] (79718) Provided verbal/tactile cueing for activities related to improving balance, coordination, kinesthetic sense, posture, motor skill, proprioception  to assist with LE, proximal hip, and core control in self care, mobility, lifting, ambulation and eccentric single leg control.      NMR and Therapeutic Activities:    [] (50101 or 62395) Provided verbal/tactile cueing for activities related to improving balance, coordination, kinesthetic sense, posture, motor skill, proprioception re-injury. Met  2. Patient will have a decrease in pain to facilitate improvement in movement, function, and ADLs as indicated by Functional Deficits. Met    Long Term Goals: To be achieved in: 12 weeks  1. Disability index score of 35 % or less for the LEFS to assist with reaching prior level of function. To 60% 8-  2. Patient will demonstrate increased AROM to 0-120 left knee extension to flexion to allow for proper joint functioning as indicated by patients Functional Deficits. Met 8-6-2018  3. Patient will demonstrate an increase in Strength to good proximal hip strength and control, within 5lb HHD in LE to allow for proper functional mobility as indicated by patients Functional Deficits. Improving  4. Patient will return to ascending and descending a flight of stairs with reciprocal gait and  Touch on handrail without increased symptoms or restriction. Met 8-  5. Walk with good gait pattern in community w/o AD. Ambulates in clinic w/o AD 8-, SC in community. Progression Towards Functional goals:  [x] Patient is progressing as expected towards functional goals listed. [] Progression is slowed due to complexities listed. [] Progression has been slowed due to co-morbidities. [] Plan just implemented, too soon to assess goals progression  [] Other:     ASSESSMENT:  Proximal calf inflammation relieved with manual techniques.     Treatment/Activity Tolerance:  [x] Patient tolerated treatment well [] Patient limited by fatique  [x] Patient limited by pain  [] Patient limited by other medical complications  [] Other:     Prognosis: [x] Good [] Fair  [] Poor    Patient Requires Follow-up: [x] Yes  [] No    PLAN:  [x] Continue per plan of care [] Alter current plan (see comments)  [] Plan of care initiated [] Hold pending MD visit [] Discharge    Electronically signed by: Christian Chawla, [address]

## 2018-09-14 ENCOUNTER — HOSPITAL ENCOUNTER (OUTPATIENT)
Dept: PHYSICAL THERAPY | Age: 58
Setting detail: THERAPIES SERIES
Discharge: HOME | End: 2018-09-14
Payer: COMMERCIAL

## 2018-09-14 PROCEDURE — G0283 ELEC STIM OTHER THAN WOUND: HCPCS

## 2018-09-14 PROCEDURE — 97112 NEUROMUSCULAR REEDUCATION: CPT

## 2018-09-14 PROCEDURE — 97110 THERAPEUTIC EXERCISES: CPT

## 2018-09-14 PROCEDURE — 97140 MANUAL THERAPY 1/> REGIONS: CPT

## 2018-09-14 NOTE — FLOWSHEET NOTE
[facility], [address] [place] [address] [facility] [place]  Phone: [phone]  Fax [phone]    Physical Therapy Daily Treatment Note  Date:  2018    Patient Name:  Janice Marino    :  1960  MRN: [id number]  Restrictions/Precautions:  HTN, Anxiety, Depression, H/O Suicide Attempt, Cerebrovascular DX, OA, Migraines, IBS, Obesity, HLD, OA  Medical/Treatment Diagnosis Information:  Diagnosis: Q89.772 S/P Left TKR (DOS: 2018),  M25.562 Left Knee Pain,  M17.12 Left Knee OA  Treatment Diagnosis: M25.562 Left Knee Pain, M25.462 Left Knee Edema,  M25.662 Left Knee Stiffness,  M62.81 Left LE Weakness, R26.2 Diffiuclty Walking. Insurance/Certification information:  PT Insurance Information: Payam (BMN, No Auth)  Physician Information:  Referring Practitioner: Dr. Ti Fournier of care signed (Y/N): due 10-8-2018    Date of Patient follow up with Physician:     G-Code (if applicable):      Date G-Code Applied:         Progress Note: []  Yes  [x]  No  Next due by: Visit #10       Latex Allergy:  [x]NO      []YES  Preferred Language for Healthcare:   [x]English       []other:    Visit # Insurance Allowable Requires auth   15   2xwk for 12 weeks    [x]no        []yes:       Pain level:   4/10 Left knee     SUBJECTIVE: Left knee feeling a little better, but still having pain in the back of her knee. Temporary relief from this pain for about half a day after therapy, but then discomfort returns. Has been compliant with new HR HEP. OBJECTIVE:  11.5 weeks post-op. Patient 13 minutes late for appointment. 2018: New order received to cont PT additional 2xwk for 4 weeks. Observation:  Good gait entering clinic w/o AD or knee brace. Good tolerance with all TE. Decrease TTP over  proximal gastroc and distal HS. DC V-comp. Test measurements: 0-135.       RESTRICTIONS/PRECAUTIONS: Left Knee TKR w/ cement (DOS 2018); hx of CVA balance, coordination, kinesthetic sense, posture, motor skill, proprioception and motor activation to allow for proper function of core, proximal hip and LE with self care and ADLs  [] (22920) Gait Re-education- Provided training and instruction to the patient for proper LE, core and proximal hip recruitment and positioning and eccentric body weight control with ambulation re-education including up and down stairs     Home Exercise Program:    [x] (98428) Reviewed/Progressed HEP activities related to strengthening, flexibility, endurance, ROM of core, proximal hip and LE for functional self-care, mobility, lifting and ambulation/stair navigation   [] (72801)Reviewed/Progressed HEP activities related to improving balance, coordination, kinesthetic sense, posture, motor skill, proprioception of core, proximal hip and LE for self care, mobility, lifting, and ambulation/stair navigation      Manual Treatments:  PROM / STM / Oscillations-Mobs:  G-I, II, III, IV (PA's, Inf., Post.)  [] (72700) Provided manual therapy to mobilize LE, proximal hip and/or LS spine soft tissue/joints for the purpose of modulating pain, promoting relaxation,  increasing ROM, reducing/eliminating soft tissue swelling/inflammation/restriction, improving soft tissue extensibility and allowing for proper ROM for normal function with self care, mobility, lifting and ambulation. Modalities:  HV ES/ Gameready Vaso x15 min left knee    Charges:  Timed Code Treatment Minutes: 50   Total Treatment Minutes: 65     [] EVAL (LOW) 01505 (typically 20 minutes face-to-face)  [] EVAL (MOD) 58877 (typically 30 minutes face-to-face)  [] EVAL (HIGH) 39577 (typically 45 minutes face-to-face)  [] RE-EVAL     [x] JM(79683) x  1   [] IONTO  [x] NMR (98256) x  1   [] VASO  [x] Manual (10128) x  1    [] Other:  [] TA x       [] Mech Traction (90269)  [] ES(attended) (03512)      [x] ES (un) (53522):     GOALS:    Short Term Goals: To be achieved in: 2 weeks  1. Independent in HEP and progression per patient tolerance, in order to prevent re-injury. Met  2. Patient will have a decrease in pain to facilitate improvement in movement, function, and ADLs as indicated by Functional Deficits. Met    Long Term Goals: To be achieved in: 12 weeks  1. Disability index score of 35 % or less for the LEFS to assist with reaching prior level of function. To 60% 8-  2. Patient will demonstrate increased AROM to 0-120 left knee extension to flexion to allow for proper joint functioning as indicated by patients Functional Deficits. Met 8-6-2018  3. Patient will demonstrate an increase in Strength to good proximal hip strength and control, within 5lb HHD in LE to allow for proper functional mobility as indicated by patients Functional Deficits. Improving  4. Patient will return to ascending and descending a flight of stairs with reciprocal gait and  Touch on handrail without increased symptoms or restriction. Met 8-  5. Walk with good gait pattern in community w/o AD. Ambulates in clinic w/o AD 8-, SC in community. Progression Towards Functional goals:  [x] Patient is progressing as expected towards functional goals listed. [] Progression is slowed due to complexities listed. [] Progression has been slowed due to co-morbidities. [] Plan just implemented, too soon to assess goals progression  [] Other:     ASSESSMENT:  Good gait. Decrease edema left knee.     Treatment/Activity Tolerance:  [x] Patient tolerated treatment well [] Patient limited by fatique  [] Patient limited by pain  [] Patient limited by other medical complications  [] Other:     Prognosis: [x] Good [] Fair  [] Poor    Patient Requires Follow-up: [x] Yes  [] No    PLAN:  [x] Continue per plan of care [] Alter current plan (see comments)  [] Plan of care initiated [] Hold pending MD visit [] Discharge    Electronically signed by: Kayleigh Colvin, [address]

## 2018-09-18 ENCOUNTER — HOSPITAL ENCOUNTER (OUTPATIENT)
Dept: PHYSICAL THERAPY | Age: 58
Setting detail: THERAPIES SERIES
Discharge: HOME | End: 2018-09-18
Payer: COMMERCIAL

## 2018-09-18 PROCEDURE — 97110 THERAPEUTIC EXERCISES: CPT

## 2018-09-18 PROCEDURE — 97140 MANUAL THERAPY 1/> REGIONS: CPT

## 2018-09-18 PROCEDURE — 97112 NEUROMUSCULAR REEDUCATION: CPT

## 2018-09-18 PROCEDURE — G0283 ELEC STIM OTHER THAN WOUND: HCPCS

## 2018-09-18 PROCEDURE — 97016 VASOPNEUMATIC DEVICE THERAPY: CPT

## 2018-09-18 NOTE — FLOWSHEET NOTE
Up and Over                   Down                   Lateral                   Rotation                Squats  mini                      wall                     [facility]OS[facility]                Lunges:  Lunge to Balance                       Balance to Lunge                       Walking                Knee Extension Bilat. Ecc.                                   Inv. Hamstring Curls Bilat. NV                              Ecc.                                   Inv.                Soleus Press Bilat. Ecc.                               Inv.                                         Ladders                    Square                   Jump/Hop  Low                          Med.                          High                                                                        Modality ES/GR 15' ES/CP 15' ES/CP 15' PM/CP 15' HV/GR 15'   Initials                             EP EP EP EP DB   Time spent with PT assistant

## 2018-09-18 NOTE — FLOWSHEET NOTE
[facility], [address] [place] [address]  Phone: [phone]  Fax [phone]    Physical Therapy Daily Treatment Note  Date:  2018    Patient Name:  Simona Spears    :  1960  MRN: [id number]  Restrictions/Precautions:  HTN, Anxiety, Depression, H/O Suicide Attempt, Cerebrovascular DX, OA, Migraines, IBS, Obesity, HLD, OA  Medical/Treatment Diagnosis Information:  Diagnosis: D75.103 S/P Left TKR (DOS: 2018),  M25.562 Left Knee Pain,  M17.12 Left Knee OA  Treatment Diagnosis: M25.562 Left Knee Pain, M25.462 Left Knee Edema,  M25.662 Left Knee Stiffness,  M62.81 Left LE Weakness, R26.2 Diffiuclty Walking. Insurance/Certification information:  PT Insurance Information: Payam (BMN, No Auth)  Physician Information:  Referring Practitioner: Dr. Patrick Jarvis of care signed (Y/N): due 10-8-2018    Date of Patient follow up with Physician:     G-Code (if applicable):      Date G-Code Applied:         Progress Note: []  Yes  [x]  No  Next due by: Visit #10       Latex Allergy:  [x]NO      []YES  Preferred Language for Healthcare:   [x]English       []other:    Visit # Insurance Allowable Requires auth   16   2xwk for 12 weeks    [x]no        []yes:       Pain level:  2-3 /10 Left knee     SUBJECTIVE: Pt states that her left knee is feeling better, but feeling stiff and achy in the anterior aspect today. Proximal calf pain has resolved. OBJECTIVE:  12 weeks post-op. 2018: New order received to cont PT additional 2xwk for 4 weeks. Observation: TTP IT Band insertion. Mild click in this area @ last 30 knee extension. Still some mild tightness in IT Band. Pt is receiving in house RX to the area and has been instructed in home stretches.    Test measurements:       RESTRICTIONS/PRECAUTIONS: Left Knee TKR w/ cement (DOS 2018); hx of CVA Exercises/Interventions:     Therapeutic Ex Sets/sec Reps Notes   Bike 5 min     3 WayGastroc Stretch on Incline H30x3     HS Rocks in Trustretch 2x10     Sidestepping @ Half Wall YTB above Knees 2 laps     Seated HS Stool Walks 2 lengths     Seated HS Stretch w/ full knee Ext in chairs H30x3     Standing HR 2x10  +hep 9-   SLR w/ Q set 3x10     Bridge w/ ID H5 2x10     Prone Q set w/ shin into ball H10x10  +hep 8-6   Prone Knee Flex Stretch w/ Strap H10x10     Prone SAH 3# 2x10     Prone Ankle PF BTB 2x10     SAQ w/ ankle ball squeeze Mat's edge 3#  H5 2x10  hep   SL Hip Abd 1# 2x10  +hep 8-   Supine IT Band Stretch w/ strap H15 x5  +hep 98-       Piriformis stretch w/ SB H15x5     ATC Resumed today. See note    Started 8-   Supine psoas stretch with knee flex with belt  2x10     Manual Intervention      Gentle manual Knee flex ext ROM, HS, and IT Band stretching; Rolling/STMgentle stretching to calf/quad/IT band 20 min  To 135°                                 NMR re-education          Half Bolster Stride Balance H5x10         Airex on 4\" step LSU 2x10     Half bolster stride balance @ bar H5 2x10     FSU up and over 6\" 2x10     Posterior Disc Slides @ bar 3x10         Therapeutic Exercise and NMR EXR  [x] (76214) Provided verbal/tactile cueing for activities related to strengthening, flexibility, endurance, ROM for improvements in LE, proximal hip, and core control with self care, mobility, lifting, ambulation.  [] (21298) Provided verbal/tactile cueing for activities related to improving balance, coordination, kinesthetic sense, posture, motor skill, proprioception  to assist with LE, proximal hip, and core control in self care, mobility, lifting, ambulation and eccentric single leg control.      NMR and Therapeutic Activities:    [] (67432 or 82209) Provided verbal/tactile cueing for activities related to improving balance, coordination, kinesthetic sense, posture, motor skill, to prevent re-injury. Met  2. Patient will have a decrease in pain to facilitate improvement in movement, function, and ADLs as indicated by Functional Deficits. Met    Long Term Goals: To be achieved in: 12 weeks  1. Disability index score of 35 % or less for the LEFS to assist with reaching prior level of function. To 60% 8-  2. Patient will demonstrate increased AROM to 0-120 left knee extension to flexion to allow for proper joint functioning as indicated by patients Functional Deficits. Met 8-6-2018  3. Patient will demonstrate an increase in Strength to good proximal hip strength and control, within 5lb HHD in LE to allow for proper functional mobility as indicated by patients Functional Deficits. Improving  4. Patient will return to ascending and descending a flight of stairs with reciprocal gait and  Touch on handrail without increased symptoms or restriction. Met 8-  5. Walk with good gait pattern in community w/o AD. Ambulates in clinic w/o AD 8-, SC in community. Progression Towards Functional goals:  [x] Patient is progressing as expected towards functional goals listed. [] Progression is slowed due to complexities listed. [] Progression has been slowed due to co-morbidities. [] Plan just implemented, too soon to assess goals progression  [] Other:     ASSESSMENT: Still has min-mod edema in left knee and benefits from V-Comp/ES.     Treatment/Activity Tolerance:  [x] Patient tolerated treatment well [] Patient limited by fatique  [] Patient limited by pain  [] Patient limited by other medical complications  [] Other:     Prognosis: [x] Good [] Fair  [] Poor    Patient Requires Follow-up: [x] Yes  [] No    PLAN:  [x] Continue per plan of care [] Alter current plan (see comments)  [] Plan of care initiated [] Hold pending MD visit [] Discharge    Electronically signed by: Karyn Rios, [address]

## 2018-09-20 DIAGNOSIS — M25.562 PAIN IN JOINT OF LEFT KNEE: ICD-10-CM

## 2018-09-20 DIAGNOSIS — Z96.652 S/P TKR (TOTAL KNEE REPLACEMENT) USING CEMENT, LEFT: ICD-10-CM

## 2018-09-20 DIAGNOSIS — M17.12 PRIMARY OSTEOARTHRITIS OF LEFT KNEE: ICD-10-CM

## 2018-09-20 RX ORDER — CLINDAMYCIN HYDROCHLORIDE 150 MG/1
CAPSULE ORAL
Qty: 8 CAPSULE | Refills: 1 | Status: SHIPPED | OUTPATIENT
Start: 2018-09-20 | End: 2019-04-12

## 2018-09-20 RX ORDER — TIZANIDINE 4 MG/1
TABLET ORAL
Qty: 30 TABLET | Refills: 0 | Status: SHIPPED | OUTPATIENT
Start: 2018-09-20 | End: 2019-04-12

## 2018-09-21 ENCOUNTER — HOSPITAL ENCOUNTER (OUTPATIENT)
Dept: PHYSICAL THERAPY | Age: 58
Setting detail: THERAPIES SERIES
Discharge: HOME | End: 2018-09-21
Payer: COMMERCIAL

## 2018-09-21 PROCEDURE — 97112 NEUROMUSCULAR REEDUCATION: CPT

## 2018-09-21 PROCEDURE — 97110 THERAPEUTIC EXERCISES: CPT

## 2018-09-21 PROCEDURE — 97140 MANUAL THERAPY 1/> REGIONS: CPT

## 2018-09-21 PROCEDURE — G0283 ELEC STIM OTHER THAN WOUND: HCPCS

## 2018-09-21 NOTE — FLOWSHEET NOTE
[facility],  [address] [place]  Phone: [phone]  Fax [phone]      ATHLETIC TRAINING [address]  Date:  2018    Patient Name:  Maria Del Carmen Antonio    :  1960  MRN: [id number]  Restrictions/Precautions:    Medical/Treatment Diagnosis Information:  ·  L TKR, L knee pain, OA  ·  L knee pain, edema, stiffness, LLE weakness, difficulty walking  Physician Information:   Dr. Alicia Mariano Post-op  8 wks  12 wks 16 wks 20 wks   24 wks                            Activity Log                                                  DOS/DOI:                                                    Date: 9/10/18 9/14/18 9/18/18 9/21/18   ATC communication   Needs work glut/add/VMO strength   Very irritated ITB today. PT wants avoidance of popping at knee   Bike       Elliptical       Treadmill       Airdyne              Gastroc stretch       Soleus stretch       Hamstring stretch       ITB stretch       Hip Flexor stretch       Quad stretch       Adductor stretch              Weight Shifting sp                                 fp                                 tp       Lateral walking (with/w/o TB)              Balance: PEP/Lluvia board                      SLS             Star excursion load/explode             Extremity reach UE/LE              Leg Press Charlie. 50# 3x10 50# 3x10 50# add 3x10 50# ball add (90-60) 3x10                     Ecc.x12  40# 2x10 40# add 2x10                      Inv. Calf Press Charlie.   40# 2x10 40# x25 50# 2x10                      Ecc.                           Inv.              NUHA   Flex                  ABd 30# R/L 3x10 30# R/L 2x10 30# R/L 3x10 30# R/L 3x10              ADd                 TKE 30# 30x5' 30# 30x5\" 30# 30x to march 30# 30x to march              Ext 30# R/L 3x10 30# R/L 3x10 30# R/L 2x15 30# R/L 3x10          Steps Up                  Up and Over                  Down Lateral                  Rotation              Squats  mini                     wall                    [facility]              Lunges:  Lunge to Balance                      Balance to Lunge                      Walking              Knee Extension Bilat. Ecc.                                  Inv. Hamstring Curls Bilat. NV 25# 2x10 limited to 0-45 when popping occurred, helped, then pain, stopped. Ecc.                                  Inv.              Soleus Press Bilat. Ecc.                              Inv.                                      Ladders                   Square                  Jump/Hop  Low                         Med.                         High                                                                     Modality ES/CP 15' PM/CP 15' HV/GR 15' GR 15'   Initials                             EP EP DB JLW   Time spent with PT assistant

## 2018-09-21 NOTE — FLOWSHEET NOTE
[facility], [address] [place] [address] [place]  Phone: [phone]  Fax [phone]    Physical Therapy Daily Treatment Note  Date:  2018    Patient Name:  Ronnie Henry    :  1960  MRN: [id number]  Restrictions/Precautions:  HTN, Anxiety, Depression, H/O Suicide Attempt, Cerebrovascular DX, OA, Migraines, IBS, Obesity, HLD, OA  Medical/Treatment Diagnosis Information:  Diagnosis: E34.724 S/P Left TKR (DOS: 2018),  M25.562 Left Knee Pain,  M17.12 Left Knee OA  Treatment Diagnosis: M25.562 Left Knee Pain, M25.462 Left Knee Edema,  M25.662 Left Knee Stiffness,  M62.81 Left LE Weakness, R26.2 Diffiuclty Walking. Insurance/Certification information:  PT Insurance Information: [employer]am (BMN, No Auth)  Physician Information:  Referring Practitioner: Dr. Shaun Espinosa of care signed (Y/N): due 10-8-2018    Date of Patient follow up with Physician:     G-Code (if applicable):      Date G-Code Applied:         Progress Note: []  Yes  [x]  No  Next due by: Visit #10       Latex Allergy:  [x]NO      []YES  Preferred Language for Healthcare:   [x]English       []other:    Visit # Insurance Allowable Requires auth   17   2xwk for 12 weeks    [x]no        []yes:       Pain level:   5/10 Left knee     SUBJECTIVE: Pt states that there is increased clicking and discomfort in the lateral aspect of her knee. Left knee still swollen. OBJECTIVE:  12.5 weeks post-op. 2018: New order received to cont PT additional 2xwk for 4 weeks. Observation: Held bike today because palatable click @ lateral joint line with full revolution. Click appears to involve IT Band Insertion. TTP also over Bicep Femoris insertion. Initiated Chandana Sims to left IT Band. Worked very short arcs and avoid any knee ROM that produced snap @ IT Band. Pt instructed to apply CP to IT Band insertion 3-4 times a day.   Test measurements:

## 2018-09-25 ENCOUNTER — HOSPITAL ENCOUNTER (OUTPATIENT)
Dept: PHYSICAL THERAPY | Age: 58
Setting detail: THERAPIES SERIES
Discharge: HOME | End: 2018-09-25
Payer: COMMERCIAL

## 2018-09-25 PROCEDURE — 97035 APP MDLTY 1+ULTRASOUND EA 15: CPT

## 2018-09-25 PROCEDURE — 97016 VASOPNEUMATIC DEVICE THERAPY: CPT

## 2018-09-25 PROCEDURE — 97112 NEUROMUSCULAR REEDUCATION: CPT

## 2018-09-25 PROCEDURE — 97110 THERAPEUTIC EXERCISES: CPT

## 2018-09-25 PROCEDURE — 97140 MANUAL THERAPY 1/> REGIONS: CPT

## 2018-09-25 NOTE — FLOWSHEET NOTE
[facility], [address] [place] [address] [place]  Phone: [phone]  Fax [phone]    Physical Therapy Daily Treatment Note  Date:  2018    Patient Name:  Adelfo Stephens    :  1960  MRN: [id number]  Restrictions/Precautions:  HTN, Anxiety, Depression, H/O Suicide Attempt, Cerebrovascular DX, OA, Migraines, IBS, Obesity, HLD, OA  Medical/Treatment Diagnosis Information:  Diagnosis: C83.759 S/P Left TKR (DOS: 2018),  M25.562 Left Knee Pain,  M17.12 Left Knee OA  Treatment Diagnosis: M25.562 Left Knee Pain, M25.462 Left Knee Edema,  M25.662 Left Knee Stiffness,  M62.81 Left LE Weakness, R26.2 Diffiuclty Walking. Insurance/Certification information:  PT Insurance Information: [employer]am (BMN, No Auth)  Physician Information:  Referring Practitioner: Dr. Quan Fallon of care signed (Y/N): due 10-8-2018    Date of Patient follow up with Physician:     G-Code (if applicable):      Date G-Code Applied:         Progress Note: []  Yes  [x]  No  Next due by: Visit #10       Latex Allergy:  [x]NO      []YES  Preferred Language for Healthcare:   [x]English       []other:    Visit # Insurance Allowable Requires auth   18   2xwk for 12 weeks    [x]no        []yes:       Pain level:   4/10 Left knee     SUBJECTIVE: Taping helped decrease discomfort @ superior-lateral aspect of left knee. Also had less snapping @ this area. OBJECTIVE:  13 weeks post-op. 2018: New order received to cont PT additional 2xwk for 4 weeks. Observation: Continued to hold bike today because palatable snap @  IT Band insertion with full revolution. Still very TTP over IT Band Insertion. Initiated US to area to calm inflammation. Continued to work TE thru limited ROM w/ kinesio-tape @ left IT Band and patella.      Test measurements:       RESTRICTIONS/PRECAUTIONS: Left Knee TKR w/ cement (DOS 2018); hx of CVA Exercises/Interventions:     Therapeutic Ex Sets/sec Reps Notes       3 WayGastroc Stretch on Incline H30x3     HS Rocks in [employer] 2x10     Sidestepping @ Half Wall YTB above Knees 2 laps         Seated HS Stretch w/ full knee Ext in chairs H30x3     Standing HR 2x10  +hep 9-   SLR w/ Q set 1.5# 3x10     Bridge w/ UT H5 2x10     Prone Q set w/ shin into ball H10x10  +hep 8-6   Prone Knee Flex Stretch w/ Strap H10x10     Prone SAH 3# 2x10         SAQ w/ ankle ball squeeze Mat's edge 3#  H5 2x10  hep   SL Hip Abd 1.5# 2x10  +hep 8-   Supine IT Band Stretch w/ strap H15 x5  +hep 98-       Piriformis stretch w/ SB H15x5     ATC Resumed today. See note  Worked very short arcs, no snap area. Started 8-   Supine psoas stretch with knee flex with belt  2x10     Manual Intervention      Gentle manual Knee flex ext ROM, HS, and IT Band stretching; Rolling/STMgentle stretching to calf/quad/IT band 20 min  To 135°                                 NMR re-education                  Airex LSU w/ Hip Abd OTB 3x10 B     Half bolster stride balance @ bar H5 2x10                 Therapeutic Exercise and NMR EXR  [x] (51179) Provided verbal/tactile cueing for activities related to strengthening, flexibility, endurance, ROM for improvements in LE, proximal hip, and core control with self care, mobility, lifting, ambulation.  [] (00846) Provided verbal/tactile cueing for activities related to improving balance, coordination, kinesthetic sense, posture, motor skill, proprioception  to assist with LE, proximal hip, and core control in self care, mobility, lifting, ambulation and eccentric single leg control.      NMR and Therapeutic Activities:    [] (14531 or 13499) Provided verbal/tactile cueing for activities related to improving balance, coordination, kinesthetic sense, posture, motor skill, proprioception and motor activation to allow for proper function of core, proximal hip and LE with self care and ADLs  [] (80468) Gait Re-education- Provided training and instruction to the patient for proper LE, core and proximal hip recruitment and positioning and eccentric body weight control with ambulation re-education including up and down stairs     Home Exercise Program:    [x] (69761) Reviewed/Progressed HEP activities related to strengthening, flexibility, endurance, ROM of core, proximal hip and LE for functional self-care, mobility, lifting and ambulation/stair navigation   [] (86572)Reviewed/Progressed HEP activities related to improving balance, coordination, kinesthetic sense, posture, motor skill, proprioception of core, proximal hip and LE for self care, mobility, lifting, and ambulation/stair navigation      Manual Treatments:  PROM / STM / Oscillations-Mobs:  G-I, II, III, IV (PA's, Inf., Post.)  [] (94618) Provided manual therapy to mobilize LE, proximal hip and/or LS spine soft tissue/joints for the purpose of modulating pain, promoting relaxation,  increasing ROM, reducing/eliminating soft tissue swelling/inflammation/restriction, improving soft tissue extensibility and allowing for proper ROM for normal function with self care, mobility, lifting and ambulation. Modalities: USx8 min (Auto, 1.0w/cms, 100% to left distal IT Band),  HV ES/ Gameready Vaso x15 min left knee    Charges:  Timed Code Treatment Minutes: 58   Total Treatment Minutes: 73     [] EVAL (LOW) 59127 (typically 20 minutes face-to-face)  [] EVAL (MOD) 93322 (typically 30 minutes face-to-face)  [] EVAL (HIGH) 42753 (typically 45 minutes face-to-face)  [] RE-EVAL     [x] PW(89222) x  1   [] IONTO  [x] NMR (86561) x  1   [x] VASO  [x] Manual (86996) x  1    [x] Other: Ultrasound  [] TA x       [] Mech Traction (59818)  [] ES(attended) (14894)      [x] ES (un) (55629):     GOALS:    Short Term Goals: To be achieved in: 2 weeks  1. Independent in HEP and progression per patient tolerance, in order to prevent re-injury. Met  2. Patient will have a decrease in pain to facilitate improvement in movement, function, and ADLs as indicated by Functional Deficits. Met    Long Term Goals: To be achieved in: 12 weeks  1. Disability index score of 35 % or less for the LEFS to assist with reaching prior level of function. To 60% 8-  2. Patient will demonstrate increased AROM to 0-120 left knee extension to flexion to allow for proper joint functioning as indicated by patients Functional Deficits. Met 8-6-2018  3. Patient will demonstrate an increase in Strength to good proximal hip strength and control, within 5lb HHD in LE to allow for proper functional mobility as indicated by patients Functional Deficits. Improving  4. Patient will return to ascending and descending a flight of stairs with reciprocal gait and  Touch on handrail without increased symptoms or restriction. Met 8-  5. Walk with good gait pattern in community w/o AD. Ambulates in clinic w/o AD 8-, SC in community. Progression Towards Functional goals:  [x] Patient is progressing as expected towards functional goals listed. [] Progression is slowed due to complexities listed. [] Progression has been slowed due to co-morbidities. [] Plan just implemented, too soon to assess goals progression  [] Other:     ASSESSMENT: No snapping @ lateral left knee with taping and limited ROM TE.     Treatment/Activity Tolerance:  [x] Patient tolerated treatment well [] Patient limited by fatique  [] Patient limited by pain  [] Patient limited by other medical complications  [] Other:     Prognosis: [x] Good [] Fair  [] Poor    Patient Requires Follow-up: [x] Yes  [] No    PLAN:  [x] Continue per plan of care [] Alter current plan (see comments)  [] Plan of care initiated [] Hold pending MD visit [] Discharge    Electronically signed by: Librado Wilkerson, [address]

## 2018-09-28 ENCOUNTER — HOSPITAL ENCOUNTER (OUTPATIENT)
Dept: PHYSICAL THERAPY | Age: 58
Setting detail: THERAPIES SERIES
Discharge: HOME | End: 2018-09-28
Payer: COMMERCIAL

## 2018-09-28 PROCEDURE — 97110 THERAPEUTIC EXERCISES: CPT

## 2018-09-28 PROCEDURE — 97035 APP MDLTY 1+ULTRASOUND EA 15: CPT

## 2018-09-28 PROCEDURE — 97140 MANUAL THERAPY 1/> REGIONS: CPT

## 2018-09-28 PROCEDURE — 97016 VASOPNEUMATIC DEVICE THERAPY: CPT

## 2018-09-28 PROCEDURE — 97112 NEUROMUSCULAR REEDUCATION: CPT

## 2018-09-28 NOTE — FLOWSHEET NOTE
Incline H30x3     HS Rocks in [place] 2x10     Sidestepping @ Half Wall YTB above Knees 2 laps         Seated HS Stretch w/ full knee Ext in chairs H30x3     Standing HR 2x10  +hep 9-   SLR w/ Q set 1.5# 3x10     Bridge w/ VA H5 2x10     Prone Q set w/ shin into ball H10x10  +hep 8-6   Prone Knee Flex Stretch w/ Strap H10x10     Prone SAH 3# 2x10         SAQ w/ ankle ball squeeze Mat's edge 3#  H5 2x10  hep   SL Hip Abd 1.5# 2x10  +hep 8-   Supine IT Band Stretch w/ strap H15 x5  +hep 98-       Piriformis stretch w/ SB H15x5     ATC   See note  Worked very short arcs, no snap area. Started 8-   Supine psoas stretch with knee flex with belt  2x10     Manual Intervention      Gentle manual Knee flex ext ROM, HS, and IT Band stretching; Rolling/STMgentle stretching to calf/quad/IT band 20 min  To 135°                                 NMR re-education          Half Bolster Stride Balance H5x10     SLS Cone Reach Matrix High mat 2 sets     Airex LSU w/ Hip Abd OTB 3x10 B     Half bolster stride balance @ bar H5 2x10         Posterior Disc Slides @ bar 3x10         Therapeutic Exercise and NMR EXR  [x] (43869) Provided verbal/tactile cueing for activities related to strengthening, flexibility, endurance, ROM for improvements in LE, proximal hip, and core control with self care, mobility, lifting, ambulation.  [] (29986) Provided verbal/tactile cueing for activities related to improving balance, coordination, kinesthetic sense, posture, motor skill, proprioception  to assist with LE, proximal hip, and core control in self care, mobility, lifting, ambulation and eccentric single leg control.      NMR and Therapeutic Activities:    [] (05952 or 17898) Provided verbal/tactile cueing for activities related to improving balance, coordination, kinesthetic sense, posture, motor skill, proprioception and motor activation to allow for proper function of core, proximal hip and LE with self care and

## 2018-10-01 ENCOUNTER — HOSPITAL ENCOUNTER (OUTPATIENT)
Dept: PHYSICAL THERAPY | Age: 58
Setting detail: THERAPIES SERIES
Discharge: HOME | End: 2018-10-01
Payer: COMMERCIAL

## 2018-10-01 PROCEDURE — 97016 VASOPNEUMATIC DEVICE THERAPY: CPT

## 2018-10-01 PROCEDURE — G8978 MOBILITY CURRENT STATUS: HCPCS

## 2018-10-01 PROCEDURE — 97140 MANUAL THERAPY 1/> REGIONS: CPT

## 2018-10-01 PROCEDURE — 97035 APP MDLTY 1+ULTRASOUND EA 15: CPT

## 2018-10-01 PROCEDURE — 97112 NEUROMUSCULAR REEDUCATION: CPT

## 2018-10-01 PROCEDURE — G0283 ELEC STIM OTHER THAN WOUND: HCPCS

## 2018-10-01 PROCEDURE — G8979 MOBILITY GOAL STATUS: HCPCS

## 2018-10-01 PROCEDURE — 97110 THERAPEUTIC EXERCISES: CPT

## 2018-10-01 NOTE — FLOWSHEET NOTE
[facility], [address] [place]  Phone: [phone]  Fax [phone]      ATHLETIC TRAINING [address]   Date:  10/1/2018    Patient Name:  Chiqui Bowman    :  1960  MRN: [id number]  Restrictions/Precautions:    Medical/Treatment Diagnosis Information:  ·  L TKR, L knee pain, OA  ·  L knee pain, edema, stiffness, LLE weakness, difficulty walking  Physician Information:   Dr. Dianna Harp Post-op  8 wks  12 wks 16 wks 20 wks   24 wks                            Activity Log                                                  DOS/DOI:                                                    Date: 9/18/18 9/21/18 9/25/18 9/28/18 10/1/18   ATC communication Needs work glut/add/VMO strength   Very irritated ITB today. PT wants avoidance of popping at knee Clicking in L knee while walking Kinesiotape at knee helpful. Able to do all exercises with no c/o pain Kinesiotape at knee   Bike        Elliptical        Treadmill        Airdyne                Gastroc stretch        Soleus stretch        Hamstring stretch        ITB stretch        Hip Flexor stretch        Quad stretch        Adductor stretch                Weight Shifting sp                                  fp                                  tp        Lateral walking (with/w/o TB)                Balance: PEP/Lluvia board                       SLS              Star excursion load/explode              Extremity reach UE/LE                Leg Press Charlie. 50# add 3x10 50# ball add (90-60) 3x10 50# 3x10 50# 3x10 ball add 60# ball add 2x10                     Ecc.x12 40# add 2x10                         Inv. Calf Press Charlie.  40# x25 50# 2x10 50# 2x12 50# 2x12 60# 2x10                      Ecc.                            Inv.                NUHA   Flex                   ABd 30# R/L 3x10 30# R/L 3x10 30# R/L 2x 30# R/L 2x12 30# R/L 3x10              ADd

## 2018-10-01 NOTE — PLAN OF CARE
[]Iontophoresis  [x] Instruction in HEP      Other:  [x] Manual Therapy                   [x]V-com                       ? [x] ultrasound to IT Band Insertion  Assessment:  [x] Improvement noted relative to goals:  1. Disability index score of 35 % or less for the LEFS to assist with reaching prior level of function. To 49% 10-1-2018  2. Patient will demonstrate increased AROM to 0-120 left knee extension to flexion to allow for proper joint functioning as indicated by patients Functional Deficits. Met 8-6-2018  3. Patient will demonstrate an increase in Strength to good proximal hip strength and control, within 5lb HHD in LE to allow for proper functional mobility as indicated by patients Functional Deficits. Improving  4. Patient will return to ascending and descending a flight of stairs with reciprocal gait and  Touch on handrail without increased symptoms or restriction. Met 8-  5. Walk with good gait pattern in community w/o AD. Met 10-1-2018               [] No Improvement noted related to goals:/Patient's response to treatment/Additional assessment:    New or Updated Goals (if applicable):  [x] No change to goals established upon initial eval/last progress note:    Plan:  Cont PT 2xwk for 2 weeks and then decrease to 1xweek for 2-4 weeks.     Electronically signed by:  Kellie Scott, [address]

## 2018-10-05 ENCOUNTER — HOSPITAL ENCOUNTER (OUTPATIENT)
Dept: PHYSICAL THERAPY | Age: 58
Setting detail: THERAPIES SERIES
Discharge: HOME | End: 2018-10-05
Payer: COMMERCIAL

## 2018-10-05 NOTE — FLOWSHEET NOTE
Exercises/Interventions:     Therapeutic Ex Sets/sec Reps Notes       3 WayGastroc Stretch on Incline H30x3     HS Rocks in [employer] 2x10     Sidestepping @ Half Wall YTB above Knees 2 laps     Seated HS Stool Walks 2 lengths     Seated HS Stretch w/ full knee Ext in chairs H30x3     Standing HR 2x10  +hep 9-   SLR w/ Q set 1.5# 3x10     Bridge w/ AR H5 2x10     Prone Knee Flex Stretch w/ Strap H10x10     Prone SAH 3# 2x10     SAQ w/ ankle ball squeeze Mat's edge 3#  H5 2x10  hep   SL Hip Abd 1.5# 2x10  +hep 8-   Supine IT Band Stretch w/ strap H15 x5  +hep 98-   Piriformis stretch w/ SB H15x5     ATC   See note  Worked very short arcs, no snap area. Started 8-   Supine psoas stretch with knee flex with belt  2x10     Manual Intervention      Gentle manual Knee flex ext ROM, HS, and IT Band stretching; Rolling/STMgentle stretching to calf/quad/IT band 15 min  To 135°                                 NMR re-education      SLS cone reach matrix high ma    SLS tramp ball throws x30     2\" LSU w/ Hip Abd OTB 3x10 B     Half bolster stride balance @ bar H5 2x10     FSU up and over4\" 2x10            Therapeutic Exercise and NMR EXR  [x] (34035) Provided verbal/tactile cueing for activities related to strengthening, flexibility, endurance, ROM for improvements in LE, proximal hip, and core control with self care, mobility, lifting, ambulation.  [] (60011) Provided verbal/tactile cueing for activities related to improving balance, coordination, kinesthetic sense, posture, motor skill, proprioception  to assist with LE, proximal hip, and core control in self care, mobility, lifting, ambulation and eccentric single leg control.      NMR and Therapeutic Activities:    [] (68109 or 12943) Provided verbal/tactile cueing for activities related to improving balance, coordination, kinesthetic sense, posture, motor skill, proprioception and motor activation to allow for proper function of core, proximal hip and LE with self care and ADLs  [] (11888) Gait Re-education- Provided training and instruction to the patient for proper LE, core and proximal hip recruitment and positioning and eccentric body weight control with ambulation re-education including up and down stairs     Home Exercise Program:    [x] (14162) Reviewed/Progressed HEP activities related to strengthening, flexibility, endurance, ROM of core, proximal hip and LE for functional self-care, mobility, lifting and ambulation/stair navigation   [] (10560)Reviewed/Progressed HEP activities related to improving balance, coordination, kinesthetic sense, posture, motor skill, proprioception of core, proximal hip and LE for self care, mobility, lifting, and ambulation/stair navigation      Manual Treatments:  PROM / STM / Oscillations-Mobs:  G-I, II, III, IV (PA's, Inf., Post.)  [] (97130) Provided manual therapy to mobilize LE, proximal hip and/or LS spine soft tissue/joints for the purpose of modulating pain, promoting relaxation,  increasing ROM, reducing/eliminating soft tissue swelling/inflammation/restriction, improving soft tissue extensibility and allowing for proper ROM for normal function with self care, mobility, lifting and ambulation. Modalities:   Gameready Vaso x15 min left knee    Charges:  Timed Code Treatment Minutes: 50   Total Treatment Minutes: 65     [] EVAL (LOW) 67353 (typically 20 minutes face-to-face)  [] EVAL (MOD) 49159 (typically 30 minutes face-to-face)  [] EVAL (HIGH) 32229 (typically 45 minutes face-to-face)  [] RE-EVAL     [x] LB(07445) x  1   [] IONTO  [x] NMR (54868) x  1   [x] VASO  [x] Manual (46572) x  1    [] Other:   [] TA x       [] Mech Traction (11458)  [] ES(attended) (01320)      [] ES (un) (01170):     GOALS:    Short Term Goals: To be achieved in: 2 weeks  1. Independent in HEP and progression per patient tolerance, in order to prevent re-injury. Met  2. Patient will have a decrease in pain to facilitate improvement in movement, function, and ADLs as indicated by Functional Deficits. Met    Long Term Goals: To be achieved in: 12 weeks  1. Disability index score of 35 % or less for the LEFS to assist with reaching prior level of function. To 49% 10-1-2018  2. Patient will demonstrate increased AROM to 0-120 left knee extension to flexion to allow for proper joint functioning as indicated by patients Functional Deficits. Met 8-6-2018  3. Patient will demonstrate an increase in Strength to good proximal hip strength and control, within 5lb HHD in LE to allow for proper functional mobility as indicated by patients Functional Deficits. Improving  4. Patient will return to ascending and descending a flight of stairs with reciprocal gait and  Touch on handrail without increased symptoms or restriction. Met 8-  5. Walk with good gait pattern in community w/o AD. Met 10-1-2018     Progression Towards Functional goals:  [] Patient is progressing as expected towards functional goals listed. [x] Progression is slowed due to complexities: Left IT Band Inflammation & snapping. [] Progression has been slowed due to co-morbidities. [] Plan just implemented, too soon to assess goals progression  [] Other:     ASSESSMENT: Resolving IT Band Tendinitis left knee.     Treatment/Activity Tolerance:  [x] Patient tolerated treatment well [] Patient limited by fatique  [] Patient limited by pain  [] Patient limited by other medical complications  [] Other:     Prognosis: [x] Good [] Fair  [] Poor    Patient Requires Follow-up: [x] Yes  [] No    PLAN:  [x] Continue per plan of care [] Alter current plan (see comments)  [] Plan of care initiated [] Hold pending MD visit [] Discharge    Electronically signed by: Sheree Campos, [address]

## 2018-10-08 ENCOUNTER — HOSPITAL ENCOUNTER (OUTPATIENT)
Dept: PHYSICAL THERAPY | Age: 58
Setting detail: THERAPIES SERIES
Discharge: HOME | End: 2018-10-08
Payer: COMMERCIAL

## 2018-10-08 PROCEDURE — 97016 VASOPNEUMATIC DEVICE THERAPY: CPT

## 2018-10-08 PROCEDURE — 97112 NEUROMUSCULAR REEDUCATION: CPT

## 2018-10-08 PROCEDURE — G0283 ELEC STIM OTHER THAN WOUND: HCPCS

## 2018-10-08 PROCEDURE — 97140 MANUAL THERAPY 1/> REGIONS: CPT

## 2018-10-08 PROCEDURE — 97110 THERAPEUTIC EXERCISES: CPT

## 2018-10-08 NOTE — FLOWSHEET NOTE
lateral gastroc and popliteus improved w/ myofascial release & rolling. Initiated discharge planning. Will decrease rx frequency to 1xwk for next 4 weeks. Pt given 30 day [facility] pass and encouraged to continue with her HEP 1-2 xweek. Pt also encouraged to begin [facility]atic Arthritis Program @ [facility]. Continued to work TE thru limited ROM w/ kinesio-tape @ left IT Band and patella. Test measurements: 0-135 degrees. RESTRICTIONS/PRECAUTIONS: Left Knee TKR w/ cement (DOS 6-); hx of CVA                                                                Exercises/Interventions:     Therapeutic Ex Sets/sec Reps Notes       3 WayGastroc Stretch on Incline H30x3     HS Rocks in Trustretch 2x10     Sidestepping YTB ankles 2 laps     Seated HS Stool Walks 2 lengths     Seated HS Stretch w/ full knee Ext in chairs H30x3     Standing HR 2x10  +hep 9-   SLR w/ Q set 1.5# 3x10     Bridge w/ IL H5 2x10     Prone Knee Flex Stretch w/ Strap H10x10     Prone SAH 3# 2x10     SAQ w/ ankle ball squeeze Mat's edge 3#  H5 2x10  hep   SL Hip Abd 1.5# 2x10  +hep 8-   Supine IT Band Stretch w/ strap H15 x5  +hep 98-   Piriformis stretch w/ SB H15x5     ATC   See note  Worked very short arcs, no snap area.   Started 8-   Supine psoas stretch with knee flex with belt  2x10     Manual Intervention      Gentle manual Knee flex ext ROM, HS, and IT Band stretching; Rolling/STMgentle stretching to calf/quad/IT band 15 min  To 135°                                 NMR re-education      SLS cone reach matrix high ma    SLS tramp ball throws x30     2\" LSU w/ Hip Abd OTB 3x10 B     Half bolster stride balance @ bar H5 2x10     FSU up and over4\" 2x10            Therapeutic Exercise and NMR EXR  [x] (75621) Provided verbal/tactile cueing for activities related to strengthening, flexibility, endurance, ROM for improvements in LE, proximal hip, and core control with self care, mobility, lifting, (typically 20 minutes face-to-face)  [] EVAL (MOD) 29142 (typically 30 minutes face-to-face)  [] EVAL (HIGH) 94945 (typically 45 minutes face-to-face)  [] RE-EVAL     [x] YG(79513) x  1   [] IONTO  [x] NMR (20202) x  1   [x] VASO  [x] Manual (29666) x  1    [] Other:   [] TA x       [] Mech Traction (15847)  [] ES(attended) (57603)      [x] ES (un) (63012):     GOALS:    Short Term Goals: To be achieved in: 2 weeks  1. Independent in HEP and progression per patient tolerance, in order to prevent re-injury. Met  2. Patient will have a decrease in pain to facilitate improvement in movement, function, and ADLs as indicated by Functional Deficits. Met    Long Term Goals: To be achieved in: 12 weeks  1. Disability index score of 35 % or less for the LEFS to assist with reaching prior level of function. To 49% 10-1-2018  2. Patient will demonstrate increased AROM to 0-120 left knee extension to flexion to allow for proper joint functioning as indicated by patients Functional Deficits. Met 8-6-2018  3. Patient will demonstrate an increase in Strength to good proximal hip strength and control, within 5lb HHD in LE to allow for proper functional mobility as indicated by patients Functional Deficits. Improving  4. Patient will return to ascending and descending a flight of stairs with reciprocal gait and  Touch on handrail without increased symptoms or restriction. Met 8-  5. Walk with good gait pattern in community w/o AD. Met 10-1-2018     Progression Towards Functional goals:  [] Patient is progressing as expected towards functional goals listed. [] Progression is slowed due to complexities: Left IT Band Inflammation & snapping. [] Progression has been slowed due to co-morbidities. [] Plan just implemented, too soon to assess goals progression  [] Other:     ASSESSMENT: Decrease IT Band irritation.   Increase discomfort @ medial knee and gastroc after increase walking/standing @ home over the

## 2018-10-16 ENCOUNTER — HOSPITAL ENCOUNTER (OUTPATIENT)
Dept: PHYSICAL THERAPY | Age: 58
Setting detail: THERAPIES SERIES
Discharge: HOME | End: 2018-10-16
Payer: COMMERCIAL

## 2018-10-16 PROCEDURE — 97110 THERAPEUTIC EXERCISES: CPT

## 2018-10-16 PROCEDURE — 97140 MANUAL THERAPY 1/> REGIONS: CPT

## 2018-10-16 PROCEDURE — 97112 NEUROMUSCULAR REEDUCATION: CPT

## 2018-10-16 PROCEDURE — 97016 VASOPNEUMATIC DEVICE THERAPY: CPT

## 2018-10-16 NOTE — FLOWSHEET NOTE
snapping during limited ROM TE. Pt instructed to purchase patellar cut-out neoprene knee sleeve to wear when up walking/standing for long periods. Measurements:  Discharge planning. Will decrease rx frequency to 1xwk for next 4 weeks. Pt given [facility] day [facility] pass and encouraged to continue with her HEP 1-2 xweek. Pt also encouraged to begin [facility] Arthritis Program @ [facility]. Continued to work TE thru limited ROM w/ kinesio-tape @ left IT Band and patella. Test measurements: 0-135 degrees. RESTRICTIONS/PRECAUTIONS: Left Knee TKR w/ cement (DOS 6-); hx of CVA                                                                Exercises/Interventions:     Therapeutic Ex Sets/sec Reps Notes       3 WayGastroc Stretch on Incline H30x3     HS Rocks in Trustretch 2x10     Sidestepping YTB ankles 2 laps     Seated HS Stool Walks 2 lengths     Seated HS Stretch w/ full knee Ext in chairs H30x3     Standing HR 2x10  +hep 9-   SLR w/ Q set 1.5# 3x10     Bridge w/ OK H5 2x10     Prone Knee Flex Stretch w/ Strap H10x10     Prone SAH 3# 2x10     SAQ w/ ankle ball squeeze Mat's edge 3#  H5 2x10  hep   SL Hip Abd 1.5# 2x10  +hep 8-   Supine IT Band Stretch w/ strap H15 x5  +hep 98-   Piriformis stretch w/ SB H15x5     ATC   See note  Worked very short arcs, no snap area.   Started 8-   Supine psoas stretch with knee flex with belt  2x10     Manual Intervention      Gentle manual Knee flex ext ROM, HS, and IT Band stretching; Rolling/STMgentle stretching to calf/quad/IT band 15 min  To 135°                                 NMR re-education      SLS cone reach matrix high ma    SLS tramp ball throws x30     2\" LSU w/ Hip Abd OTB 3x10 B     Half bolster stride balance @ bar H5 2x10     FSU up and over4\" 2x10            Therapeutic Exercise and NMR EXR  [x] (94106) Provided verbal/tactile cueing for activities related to strengthening, flexibility, endurance, ROM for improvements in

## 2018-10-19 ENCOUNTER — OFFICE VISIT (OUTPATIENT)
Dept: ENDOCRINOLOGY | Age: 58
End: 2018-10-19
Payer: COMMERCIAL

## 2018-10-19 ENCOUNTER — APPOINTMENT (OUTPATIENT)
Dept: PHYSICAL THERAPY | Age: 58
End: 2018-10-19
Payer: COMMERCIAL

## 2018-10-19 ENCOUNTER — HOSPITAL ENCOUNTER (OUTPATIENT)
Age: 58
Discharge: HOME | End: 2018-10-19
Payer: COMMERCIAL

## 2018-10-19 VITALS
HEART RATE: 68 BPM | BODY MASS INDEX: 30.59 KG/M2 | DIASTOLIC BLOOD PRESSURE: 79 MMHG | SYSTOLIC BLOOD PRESSURE: 121 MMHG | WEIGHT: 155.8 LBS | RESPIRATION RATE: 14 BRPM | OXYGEN SATURATION: 98 % | HEIGHT: 60 IN

## 2018-10-19 DIAGNOSIS — E03.9 ACQUIRED HYPOTHYROIDISM: Primary | ICD-10-CM

## 2018-10-19 DIAGNOSIS — E03.9 ACQUIRED HYPOTHYROIDISM: ICD-10-CM

## 2018-10-19 LAB
T4 FREE: 1.8 NG/DL (ref 0.9–1.8)
TSH SERPL DL<=0.05 MIU/L-ACNC: 1.19 UIU/ML (ref 0.27–4.2)

## 2018-10-19 PROCEDURE — 36415 COLL VENOUS BLD VENIPUNCTURE: CPT

## 2018-10-19 PROCEDURE — 84439 ASSAY OF FREE THYROXINE: CPT

## 2018-10-19 PROCEDURE — 84443 ASSAY THYROID STIM HORMONE: CPT

## 2018-10-19 PROCEDURE — 99213 OFFICE O/P EST LOW 20 MIN: CPT | Performed by: INTERNAL MEDICINE

## 2018-10-19 NOTE — PROGRESS NOTES
History:   Diagnosis Date    Anticoagulant long-term use     Anxiety     Arthritis     Cerebrovascular disease 2/2014    stroke    Depression     Fibromyalgia     Generalized osteoarthritis 3/6/2015    GERD (gastroesophageal reflux disease)     H/O suicide attempt     Headache(784.0)     Hyperlipidemia     Hypertension     Hypothyroidism     Irritable bowel syndrome     Migraines     Obesity     Patent foramen ovale 2/27/2014    PONV (postoperative nausea and vomiting)      Past Surgical History:   Procedure Laterality Date    CHOLECYSTECTOMY  1990's    laparoscopic    COLONOSCOPY  2014    COLONOSCOPY  06/29/2017    random biopsies    COLONOSCOPY  01/23/2017    polyp    HYSTERECTOMY  1980's    total vaginal hysterectomy, retains both ovaries, menorrhagia, fibroid tumors    JOINT REPLACEMENT Left 06/28/2018    LTK     KNEE CARTILAGE SURGERY Right     KNEE SURGERY      THYROIDECTOMY       Family History   Problem Relation Age of Onset    Clotting Disorder Mother     Other Mother         lung disease    Cancer Father         colon, brain    Arthritis Father         polio    Heart Disease Father     Heart Disease Brother     Obesity Brother     Substance Abuse Brother      History   Smoking Status    Never Smoker   Smokeless Tobacco    Never Used      History   Alcohol Use    1.2 oz/week    2 Glasses of wine per week     Comment: akiko Hammond is a 60 y.o. female who is here for a follow-up visit for management of thyroid disease. PCP   No primary care provider on file. She has a PMH of hypertension, hyperlipidemia, GERD, anxiety, depression, fibromyalgia,obesity, OA, Stroke. Diagnosed with Hypothyroidism in 2005. She had thyroidectomy for goiter and thyroid nodule in 2005. It was benign on path as per patient. Did not tolerate [place] thyroid. She was having diarrhea.  It has resolved    Current regimen : Levothyroxine 137 mcg daily since is valid for stable  renal function.  GFR  06/29/2018 >60  >60 Final    Comment: Chronic Kidney Disease: less than 60 ml/min/1.73 sq.m. Kidney Failure: less than 15 ml/min/1.73 sq.m. Results valid for patients 18 years and older.  Calcium 06/29/2018 8.7  8.3 - 10.6 mg/dL Final    WBC 06/29/2018 9.9  4.0 - 11.0 K/uL Final    RBC 06/29/2018 3.66* 4.00 - 5.20 M/uL Final    Hemoglobin 06/29/2018 11.4* 12.0 - 16.0 g/dL Final    Hematocrit 06/29/2018 33.6* 36.0 - 48.0 % Final    MCV 06/29/2018 91.9  80.0 - 100.0 fL Final    MCH 06/29/2018 31.1  26.0 - 34.0 pg Final    MCHC 06/29/2018 33.8  31.0 - 36.0 g/dL Final    RDW 06/29/2018 15.0  12.4 - 15.4 % Final    Platelets 61/83/8642 180  135 - 450 K/uL Final    MPV 06/29/2018 7.3  5.0 - 10.5 fL Final    Hemoglobin 06/28/2018 12.3  12.0 - 16.0 g/dL Final    Hematocrit 06/28/2018 36.8  36.0 - 48.0 % Final    WBC 06/30/2018 9.5  4.0 - 11.0 K/uL Final    RBC 06/30/2018 3.61* 4.00 - 5.20 M/uL Final    Hemoglobin 06/30/2018 11.1* 12.0 - 16.0 g/dL Final    Hematocrit 06/30/2018 32.8* 36.0 - 48.0 % Final    MCV 06/30/2018 90.8  80.0 - 100.0 fL Final    MCH 06/30/2018 30.7  26.0 - 34.0 pg Final    MCHC 06/30/2018 33.8  31.0 - 36.0 g/dL Final    RDW 06/30/2018 15.1  12.4 - 15.4 % Final    Platelets 30/16/7192 173  135 - 450 K/uL Final    MPV 06/30/2018 6.8  5.0 - 10.5 fL Final    WBC 07/01/2018 9.9  4.0 - 11.0 K/uL Final    RBC 07/01/2018 3.59* 4.00 - 5.20 M/uL Final    Hemoglobin 07/01/2018 11.0* 12.0 - 16.0 g/dL Final    Hematocrit 07/01/2018 32.3* 36.0 - 48.0 % Final    MCV 07/01/2018 90.0  80.0 - 100.0 fL Final    MCH 07/01/2018 30.8  26.0 - 34.0 pg Final    MCHC 07/01/2018 34.2  31.0 - 36.0 g/dL Final    RDW 07/01/2018 14.6  12.4 - 15.4 % Final    Platelets 59/58/6594 196  135 - 450 K/uL Final    MPV 07/01/2018 6.9  5.0 - 10.5 fL Final           Assessment/Plan    1.  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 On  Levothyroxine 137 mcg daily. Clinically , she has felt better. Repeat labs today. 2. HTN/HLP/GERD as per PCP    3. Stroke Follows with neurology at [facility]. On plavix. Had temporal lobe biopsy. Was on steroids. Now on depakote. 4. OA Follows with ortho. S/p L Knee replacement. 5. Chronic Diarrhea. As per GI     Will call her cell with results.  Ok to leave a message

## 2018-10-20 RX ORDER — LEVOTHYROXINE SODIUM 137 UG/1
137 TABLET ORAL DAILY
Qty: 90 TABLET | Refills: 1 | Status: SHIPPED | OUTPATIENT
Start: 2018-10-20 | End: 2019-05-29 | Stop reason: SDUPTHER

## 2018-10-23 ENCOUNTER — HOSPITAL ENCOUNTER (OUTPATIENT)
Dept: PHYSICAL THERAPY | Age: 58
Setting detail: THERAPIES SERIES
Discharge: HOME | End: 2018-10-23
Payer: COMMERCIAL

## 2018-10-23 PROCEDURE — 97140 MANUAL THERAPY 1/> REGIONS: CPT

## 2018-10-23 PROCEDURE — 97110 THERAPEUTIC EXERCISES: CPT

## 2018-10-23 PROCEDURE — 97016 VASOPNEUMATIC DEVICE THERAPY: CPT

## 2018-10-23 PROCEDURE — 97112 NEUROMUSCULAR REEDUCATION: CPT

## 2018-10-30 ENCOUNTER — HOSPITAL ENCOUNTER (OUTPATIENT)
Dept: PHYSICAL THERAPY | Age: 58
Setting detail: THERAPIES SERIES
Discharge: HOME | End: 2018-10-30
Payer: COMMERCIAL

## 2018-10-30 NOTE — BRIEF OP NOTE
[facility],  [address]      Physical Therapy  Cancellation/No-show Note  Patient Name:  Eleazar Riley  :  1960   Date:  10/30/2018  Cancelled visits to date: 1  No-shows to date: 0    For today's appointment patient:  [x]  Cancelled  []  Rescheduled appointment  []  No-show     Reason given by patient:  [x]  Patient ill  []  Conflicting appointment  []  No transportation    []  Conflict with work  []  No reason given  []  Other:     Comments:      Electronically signed by:  Edinson Amador

## 2018-11-06 ENCOUNTER — HOSPITAL ENCOUNTER (OUTPATIENT)
Dept: PHYSICAL THERAPY | Age: 58
Setting detail: THERAPIES SERIES
Discharge: HOME | End: 2018-11-06
Payer: COMMERCIAL

## 2018-11-06 PROCEDURE — 97110 THERAPEUTIC EXERCISES: CPT

## 2018-11-06 PROCEDURE — 97016 VASOPNEUMATIC DEVICE THERAPY: CPT

## 2018-11-06 PROCEDURE — G8978 MOBILITY CURRENT STATUS: HCPCS

## 2018-11-06 PROCEDURE — G8980 MOBILITY D/C STATUS: HCPCS

## 2018-11-06 PROCEDURE — G8979 MOBILITY GOAL STATUS: HCPCS

## 2018-11-06 PROCEDURE — 97112 NEUROMUSCULAR REEDUCATION: CPT

## 2018-11-06 NOTE — FLOWSHEET NOTE
Discharge Summary   9 weeks post-op/ last PT RX 2 weeks ago  Observation: Reviewed Consolidated HEP. Patient was able to demonstrate good technique w/ all TE's. Instructed to continue program 3-4 xwk for next 6 months. Test measurements:   RESTRICTIONS/PRECAUTIONS: Left Knee TKR w/ cement (DOS 6-); hx of CVA                                                                Exercises/Interventions:     Therapeutic Ex Sets/sec Reps Notes   Consolidated HEP:      Sidestepping w/ TB 3 min  +hep 11-6   Calf Stretches on Step H30x3  +hep 11-6   SLR w/ Q set 2x10  +hep 11-6   Prone Quad Stretch w/ Strap H30x3  +hep 11-6   Bridge w/ Hip Abd BTB 2x10  +hep 11-6   SL Hip Abd 2x10  +hep 11-6   SAQ w/ Ankle Ball Squeeze 2x10  +hep 11-6   3 Way Wall Stretches (HS, Hip Add, Piriformis/Gluteal) 3 min  +hep 11-6                     3 WayGastroc Stretch on Incline H30x3     2x10     YTB ankles 2 laps     Hold 1 min each x2          2x10  +hep 9-   1.5# 3x10      2x10     H10x10     3# 2x10     3#  H5 2x10  hep    +hep 8-    +hep 98-   H15x5     ATC   See note  Worked very short arcs, no snap area.   Started 8-   Supine psoas stretch with knee flex with belt  2x10     Manual Intervention      Gentle manual Knee flex ext ROM, HS, and IT Band stretching; Rolling/STMgentle stretching to calf/quad/IT band 15 min  To 135°                                 NMR re-education      SLS cone reach matrix high ma    SLS tramp ball throws x30     2\" LSU w/ Hip Abd OTB 3x10 B     Half bolster stride balance @ bar H5 2x10     FSU up and over4\" 2x10            Therapeutic Exercise and NMR EXR  [x] (79610) Provided verbal/tactile cueing for activities related to strengthening, flexibility, endurance, ROM for improvements in LE, proximal hip, and core control with self care, mobility, lifting, ambulation.  [] (76971) Provided verbal/tactile cueing for activities related to improving balance, coordination, kinesthetic

## 2018-11-06 NOTE — FLOWSHEET NOTE
Lateral                  Rotation              Squats  mini                     wall                    [facility]              Lunges:  Lunge to Balance                      Balance to Lunge                      Walking              Knee Extension Bilat. 10# 2x10 10# 2x12 10# 3x10 10# 3x12                              Ecc.                                  Inv. Hamstring Curls Bilat. 25# 2x12 25# 3x10 30# 3x10 30# 3x12                              Ecc. 20# 2x10 20# 2x12 20# 3x10                               Inv.              Soleus Press Bilat. Ecc.                              Inv.                                      Ladders                   Square                  Jump/Hop  Low                         Med.                         High                                                                     Modality [id number] GR 15' GR 15' GR15'   Initials                             EP DTM EP EP   Time spent with PT assistant

## 2018-11-06 NOTE — DISCHARGE SUMMARY
adhere to the plan of care established at evaluation. [] Referred back to physician for re-evaluation and did not return.      [] Other:?       Electronically signed by:  Luis Enrique Ricci, [address]

## 2018-12-05 ENCOUNTER — OFFICE VISIT (OUTPATIENT)
Dept: ORTHOPEDIC SURGERY | Age: 58
End: 2018-12-05
Payer: COMMERCIAL

## 2018-12-05 VITALS — HEIGHT: 60 IN | WEIGHT: 155.87 LBS | BODY MASS INDEX: 30.6 KG/M2

## 2018-12-05 DIAGNOSIS — Z96.652 S/P TKR (TOTAL KNEE REPLACEMENT) USING CEMENT, LEFT: Primary | ICD-10-CM

## 2018-12-05 PROCEDURE — 99212 OFFICE O/P EST SF 10 MIN: CPT | Performed by: ORTHOPAEDIC SURGERY

## 2019-04-12 ENCOUNTER — APPOINTMENT (OUTPATIENT)
Dept: CT IMAGING | Age: 59
End: 2019-04-12
Payer: COMMERCIAL

## 2019-04-12 ENCOUNTER — HOSPITAL ENCOUNTER (EMERGENCY)
Age: 59
Discharge: HOME | End: 2019-04-13
Attending: EMERGENCY MEDICINE
Payer: COMMERCIAL

## 2019-04-12 DIAGNOSIS — F41.1 ANXIETY STATE: ICD-10-CM

## 2019-04-12 DIAGNOSIS — R51.9 ACUTE NONINTRACTABLE HEADACHE, UNSPECIFIED HEADACHE TYPE: Primary | ICD-10-CM

## 2019-04-12 LAB
A/G RATIO: 1.9 (ref 1.1–2.2)
ALBUMIN SERPL-MCNC: 4.5 G/DL (ref 3.4–5)
ALP BLD-CCNC: 74 U/L (ref 40–129)
ALT SERPL-CCNC: 13 U/L (ref 10–40)
ANION GAP SERPL CALCULATED.3IONS-SCNC: 13 MMOL/L (ref 3–16)
AST SERPL-CCNC: 28 U/L (ref 15–37)
BASOPHILS ABSOLUTE: 0 K/UL (ref 0–0.2)
BASOPHILS RELATIVE PERCENT: 0.6 %
BILIRUB SERPL-MCNC: <0.2 MG/DL (ref 0–1)
BUN BLDV-MCNC: 17 MG/DL (ref 7–20)
CALCIUM SERPL-MCNC: 9 MG/DL (ref 8.3–10.6)
CHLORIDE BLD-SCNC: 101 MMOL/L (ref 99–110)
CO2: 24 MMOL/L (ref 21–32)
CREAT SERPL-MCNC: 0.9 MG/DL (ref 0.6–1.1)
EOSINOPHILS ABSOLUTE: 0.2 K/UL (ref 0–0.6)
EOSINOPHILS RELATIVE PERCENT: 2.3 %
GFR AFRICAN AMERICAN: >60
GFR NON-AFRICAN AMERICAN: >60
GLOBULIN: 2.4 G/DL
GLUCOSE BLD-MCNC: 88 MG/DL (ref 70–99)
HCT VFR BLD CALC: 40.6 % (ref 36–48)
HEMOGLOBIN: 13.6 G/DL (ref 12–16)
LYMPHOCYTES ABSOLUTE: 2.3 K/UL (ref 1–5.1)
LYMPHOCYTES RELATIVE PERCENT: 32.9 %
MAGNESIUM: 2.1 MG/DL (ref 1.8–2.4)
MCH RBC QN AUTO: 29.9 PG (ref 26–34)
MCHC RBC AUTO-ENTMCNC: 33.4 G/DL (ref 31–36)
MCV RBC AUTO: 89.6 FL (ref 80–100)
MONOCYTES ABSOLUTE: 0.5 K/UL (ref 0–1.3)
MONOCYTES RELATIVE PERCENT: 7.7 %
NEUTROPHILS ABSOLUTE: 3.9 K/UL (ref 1.7–7.7)
NEUTROPHILS RELATIVE PERCENT: 56.5 %
PDW BLD-RTO: 15.4 % (ref 12.4–15.4)
PLATELET # BLD: 173 K/UL (ref 135–450)
PMV BLD AUTO: 6.6 FL (ref 5–10.5)
POTASSIUM SERPL-SCNC: 4 MMOL/L (ref 3.5–5.1)
POTASSIUM SERPL-SCNC: 5.6 MMOL/L (ref 3.5–5.1)
RBC # BLD: 4.53 M/UL (ref 4–5.2)
SODIUM BLD-SCNC: 138 MMOL/L (ref 136–145)
SPECIMEN STATUS: NORMAL
TOTAL PROTEIN: 6.9 G/DL (ref 6.4–8.2)
WBC # BLD: 6.8 K/UL (ref 4–11)

## 2019-04-12 PROCEDURE — 99284 EMERGENCY DEPT VISIT MOD MDM: CPT

## 2019-04-12 PROCEDURE — 83735 ASSAY OF MAGNESIUM: CPT

## 2019-04-12 PROCEDURE — 96374 THER/PROPH/DIAG INJ IV PUSH: CPT

## 2019-04-12 PROCEDURE — 6360000002 HC RX W HCPCS: Performed by: NURSE PRACTITIONER

## 2019-04-12 PROCEDURE — 84132 ASSAY OF SERUM POTASSIUM: CPT

## 2019-04-12 PROCEDURE — 6370000000 HC RX 637 (ALT 250 FOR IP): Performed by: EMERGENCY MEDICINE

## 2019-04-12 PROCEDURE — 70450 CT HEAD/BRAIN W/O DYE: CPT

## 2019-04-12 PROCEDURE — 96375 TX/PRO/DX INJ NEW DRUG ADDON: CPT

## 2019-04-12 PROCEDURE — 85025 COMPLETE CBC W/AUTO DIFF WBC: CPT

## 2019-04-12 PROCEDURE — 96372 THER/PROPH/DIAG INJ SC/IM: CPT

## 2019-04-12 PROCEDURE — 2580000003 HC RX 258: Performed by: NURSE PRACTITIONER

## 2019-04-12 PROCEDURE — 6360000002 HC RX W HCPCS: Performed by: EMERGENCY MEDICINE

## 2019-04-12 PROCEDURE — 80053 COMPREHEN METABOLIC PANEL: CPT

## 2019-04-12 PROCEDURE — 96361 HYDRATE IV INFUSION ADD-ON: CPT

## 2019-04-12 PROCEDURE — 96376 TX/PRO/DX INJ SAME DRUG ADON: CPT

## 2019-04-12 RX ORDER — ONDANSETRON 2 MG/ML
4 INJECTION INTRAMUSCULAR; INTRAVENOUS ONCE
Status: COMPLETED | OUTPATIENT
Start: 2019-04-12 | End: 2019-04-12

## 2019-04-12 RX ORDER — ORPHENADRINE CITRATE 100 MG/1
100 TABLET, EXTENDED RELEASE ORAL 2 TIMES DAILY PRN
COMMUNITY

## 2019-04-12 RX ORDER — PROMETHAZINE HYDROCHLORIDE 25 MG/1
25 TABLET ORAL EVERY 6 HOURS PRN
COMMUNITY

## 2019-04-12 RX ORDER — 0.9 % SODIUM CHLORIDE 0.9 %
1000 INTRAVENOUS SOLUTION INTRAVENOUS ONCE
Status: COMPLETED | OUTPATIENT
Start: 2019-04-12 | End: 2019-04-12

## 2019-04-12 RX ORDER — MORPHINE SULFATE 4 MG/ML
4 INJECTION, SOLUTION INTRAMUSCULAR; INTRAVENOUS EVERY 30 MIN PRN
Status: DISCONTINUED | OUTPATIENT
Start: 2019-04-12 | End: 2019-04-13 | Stop reason: HOSPADM

## 2019-04-12 RX ORDER — METOCLOPRAMIDE HYDROCHLORIDE 5 MG/ML
10 INJECTION INTRAMUSCULAR; INTRAVENOUS ONCE
Status: COMPLETED | OUTPATIENT
Start: 2019-04-12 | End: 2019-04-12

## 2019-04-12 RX ORDER — DIPHENHYDRAMINE HYDROCHLORIDE 50 MG/ML
12.5 INJECTION INTRAMUSCULAR; INTRAVENOUS ONCE
Status: COMPLETED | OUTPATIENT
Start: 2019-04-12 | End: 2019-04-12

## 2019-04-12 RX ORDER — CLOPIDOGREL BISULFATE 75 MG/1
75 TABLET ORAL DAILY
COMMUNITY

## 2019-04-12 RX ORDER — PROCHLORPERAZINE MALEATE 5 MG/1
10 TABLET ORAL ONCE
Status: COMPLETED | OUTPATIENT
Start: 2019-04-12 | End: 2019-04-12

## 2019-04-12 RX ORDER — ORPHENADRINE CITRATE 30 MG/ML
60 INJECTION INTRAMUSCULAR; INTRAVENOUS ONCE
Status: COMPLETED | OUTPATIENT
Start: 2019-04-12 | End: 2019-04-12

## 2019-04-12 RX ADMIN — METOCLOPRAMIDE 10 MG: 5 INJECTION, SOLUTION INTRAMUSCULAR; INTRAVENOUS at 19:15

## 2019-04-12 RX ADMIN — PROCHLORPERAZINE MALEATE 10 MG: 5 TABLET, FILM COATED ORAL at 21:57

## 2019-04-12 RX ADMIN — MORPHINE SULFATE 4 MG: 4 INJECTION INTRAVENOUS at 23:21

## 2019-04-12 RX ADMIN — ORPHENADRINE CITRATE 60 MG: 30 INJECTION INTRAMUSCULAR; INTRAVENOUS at 21:56

## 2019-04-12 RX ADMIN — MORPHINE SULFATE 4 MG: 4 INJECTION INTRAVENOUS at 19:15

## 2019-04-12 RX ADMIN — ONDANSETRON 4 MG: 2 INJECTION INTRAMUSCULAR; INTRAVENOUS at 21:57

## 2019-04-12 RX ADMIN — DIPHENHYDRAMINE HYDROCHLORIDE 12.5 MG: 50 INJECTION INTRAMUSCULAR; INTRAVENOUS at 19:15

## 2019-04-12 RX ADMIN — SODIUM CHLORIDE 1000 ML: 9 INJECTION, SOLUTION INTRAVENOUS at 19:15

## 2019-04-12 ASSESSMENT — PAIN DESCRIPTION - DESCRIPTORS: DESCRIPTORS: HEADACHE;THROBBING

## 2019-04-12 ASSESSMENT — PAIN SCALES - GENERAL
PAINLEVEL_OUTOF10: 10
PAINLEVEL_OUTOF10: 9
PAINLEVEL_OUTOF10: 10
PAINLEVEL_OUTOF10: 9

## 2019-04-12 ASSESSMENT — PAIN DESCRIPTION - LOCATION
LOCATION: HEAD
LOCATION: HEAD

## 2019-04-12 ASSESSMENT — PAIN DESCRIPTION - PAIN TYPE: TYPE: ACUTE PAIN

## 2019-04-12 NOTE — ED NOTES
Patient reports was working with a  today and the  noticed her right leg moving towards her left a little and patient was unaware. Patient became dizzy and sat down. [place] ended session and patient went home. Patient reports she woke up with headache and has had all day. Patient has not taken any medication for the headache. Patient reports \"just didn't feel right\" called neurologist when got home because bp was 177/97 and pulse was 101. Neurologist advised to come to ED due to patient having CVA about 5 years ago. Patient reports nausea currently and headache at 10 on 1-10 scale .       [facility], [facility] / [facility]  04/12/19 [id number]

## 2019-04-12 NOTE — ED PROVIDER NOTES
Patient reports that her dizziness is described as wobbly, unsteady and woozy. States that she did not pass out but felt as if she didn't sit down something would happen. She denies any room spinning sensation. States she gets here her heart pounding in her ears. Currently patient is still reporting headache and shaking. Patient states that she called her neurologist who advised her to come to the ED. The patient is currently rating their pain as 5/10 and describes it as an aching type of pain. No treatments have been tried prior to arrival in the ED. The patient arrived to the ED via private car and is accompanied by family who is bedside for the visit. Nursing notes reviewed.    Past Medical History:   Diagnosis Date    Anticoagulant long-term use     Anxiety     Arthritis     Cerebrovascular disease 2/2014    stroke    Depression     Fibromyalgia     Generalized osteoarthritis 3/6/2015    GERD (gastroesophageal reflux disease)     H/O suicide attempt     Headache(784.0)     Hyperlipidemia     Hypertension     Hypothyroidism     Irritable bowel syndrome     Migraines     Obesity     Patent foramen ovale 2/27/2014    PONV (postoperative nausea and vomiting)      Past Surgical History:   Procedure Laterality Date    CHOLECYSTECTOMY  1990's    laparoscopic    COLONOSCOPY  2014    COLONOSCOPY  06/29/2017    random biopsies    COLONOSCOPY  01/23/2017    polyp    HYSTERECTOMY  1980's    total vaginal hysterectomy, retains both ovaries, menorrhagia, fibroid tumors    JOINT REPLACEMENT Left 06/28/2018    LTK     KNEE CARTILAGE SURGERY Right     KNEE SURGERY      THYROIDECTOMY       Family History   Problem Relation Age of Onset    Clotting Disorder Mother     Other Mother         lung disease    Cancer Father         colon, brain    Arthritis Father         polio    Heart Disease Father     Heart Disease Brother     Obesity Brother     Substance Abuse Brother      Social History     Socioeconomic History    Marital status:      Spouse name: Not on file    Number of children: Not on file    Years of education: Not on file    Highest education level: Not on file   Occupational History    Occupation: retired   Social Needs    Financial resource strain: Not on file    Food insecurity:     Worry: Not on file     Inability: Not on file   Internal Gaming needs:     Medical: Not on file     Non-medical: Not on file   Tobacco Use    Smoking status: Never Smoker    Smokeless tobacco: Never Used   Substance and Sexual Activity    Alcohol use: Yes     Alcohol/week: 1.2 oz     Types: 2 Glasses of wine per week     Comment: socially    Drug use: No    Sexual activity: Not Currently     Birth control/protection: Post-menopausal   Lifestyle    Physical activity:     Days per week: Not on file     Minutes per session: Not on file    Stress: Not on file   Relationships    Social connections:     Talks on phone: Not on file     Gets together: Not on file     Attends [employer] service: Not on file     Active member of club or organization: Not on file     Attends meetings of clubs or organizations: Not on file     Relationship status: Not on file    Intimate partner violence:     Fear of current or ex partner: Not on file     Emotionally abused: Not on file     Physically abused: Not on file     Forced sexual activity: Not on file   Other Topics Concern    Not on file   Social History Narrative    Not on file     No current facility-administered medications for this encounter.       Current Outpatient Medications   Medication Sig Dispense Refill    clopidogrel (PLAVIX) 75 MG tablet Take 75 mg by mouth daily      topiramate ER (TROKENDI XR) 100 MG CP24 Take by mouth      orphenadrine (NORFLEX) 100 MG extended release tablet Take 100 mg by mouth 2 times daily      promethazine (PHENERGAN) 25 MG tablet Take 25 mg by mouth every 6 hours as needed for Nausea      NONFORMULARY amovig injesctions 2 at one, once a month      levothyroxine (SYNTHROID) 137 MCG tablet Take 1 tablet by mouth Daily 90 tablet 1    lamoTRIgine (LAMICTAL) 150 MG tablet Take 150 mg by mouth daily      DULoxetine (CYMBALTA) 60 MG capsule Take 2 capsules by mouth daily 120 capsule 3    Cholecalciferol (VITAMIN D) 2000 UNITS CAPS capsule Take 1 capsule by mouth daily      clonazePAM (KLONOPIN) 0.5 MG tablet Take 1 tablet by mouth 2 times daily as needed 60 tablet 0    omeprazole (PRILOSEC) 40 MG capsule TAKE 1 CAPSULE BY MOUTH DAILY 90 capsule 3    diclofenac sodium (VOLTAREN) 1 % GEL Apply 4 g topically 3 times daily as needed for Pain Apply to LEFT knee 1 Tube 1     Allergies   Allergen Reactions    Latex Other (See Comments)     Turns skin really red with use of latex gloves    Codeine Hives     Pt can take Norco with Benadryl    Ketorolac     Ketorolac Tromethamine     Kiwi Extract Itching    Nsaids Other (See Comments)     \"tear up my stomach\"    Penicillins Hives    Tolmetin        REVIEW OF SYSTEMS  10 systems reviewed, pertinent positives per HPI otherwise noted to be negative    PHYSICAL EXAM  ED Triage Vitals [04/12/19 1802]   Enc Vitals Group      BP (!) 148/86      Pulse 103      Resp 18      Temp 98.4 °F (36.9 °C)      Temp Source Oral      SpO2 96 %      Weight 152 lb (68.9 kg)      Height 5' (1.524 m)      Head Circumference       Peak Flow       Pain Score       Pain Loc       Pain Edu? Excl. in [address]? GENERAL APPEARANCE: Well developed, well nourished. Awake and alert. Cooperative. Observed resting in bed. No acute distress. Answers questions appropriately. HEAD: Normocephalic. Atraumatic. No facial asymmetry upon exam. Sensation is intact to light touch to the face. Smile is symmetrical, tongue is midline. Uvula is midline and elevates appropriately. Posterior oropharynx is without erythema, edema, or exudate. EYES: Sclera is non-icteric. Conjunctiva are pink. NARAYAN Bianchi. No nystagmus. ENT: External ears are normal. Mucous membranes are moist. Tongue in the midline. Pharynx without erythema or exudates. No uvular deviation. NECK: Supple. Normal ROM. Non-tender. Trachea mid-line. No stridor or meningismus. Cardiac: Regular rate and rhythm. Capillary refill is brisk in all extremities. S1/S2 is normal. There are no murmurs, rubs, or gallops to auscultation. Strong and equal pulses in the upper and lower extremities. No edema or cyanosis. LUNGS: Breathing is unlabored. Speaking comfortably in full sentences. Equal and symmetric chest rise. Breath sounds are clear throughout. There is no wheezing, rales, or rhonchi to auscultation. Abdomen: Non-distended. Non-tender to palpation. Bowel sounds are positive in all 4 quadrants. There is no hepatosplenomegaly to palpation. No palpable pulsatile masses. Musculoskeletal: Normal ROM. Moving all extremities equally and appropriately. Upper and lower extremities have no deformities and they are non-tender to palpation. The calves are nontender to palpation. Active range of motion. NEUROLOGICAL: Awake, alert and oriented x3. Strength is normal in the upper and lower extremities, 5/5, equal and symmetric. Sensation is intact to light touch in the upper and lower extremities. No focal motor or sensory deficits. Gait observed and normal. No pronator drift or lower extremity drift. Finger to nose is intact bilaterally. Heel to shin is intact bilaterally. SKIN: Warm and dry. Skin is with good color. Skin is intact. No petechiae, rashes, or ecchymoses. IMMUNOLOGICAL: No palpable lymphadenopathy or lymphatic streaking. Psychiatric: Mood and affect appropriate. Speech is clear and articulate.     LABS  Results for orders placed or performed during the hospital encounter of 04/12/19   Comprehensive Metabolic Panel   Result Value Ref Range    Sodium 138 136 - 145 mmol/L    Potassium 5.6 (H) 3.5 - 5.1 mmol/L    Chloride 101 99 - 110 mmol/L    CO2 24 21 - 32 mmol/L    Anion Gap 13 3 - 16    Glucose 88 70 - 99 mg/dL    BUN 17 7 - 20 mg/dL    CREATININE 0.9 0.6 - 1.1 mg/dL    GFR Non-African American >60 >60    GFR African American >60 >60    Calcium 9.0 8.3 - 10.6 mg/dL    Total Protein 6.9 6.4 - 8.2 g/dL    Alb 4.5 3.4 - 5.0 g/dL    Albumin/Globulin Ratio 1.9 1.1 - 2.2    Total Bilirubin <0.2 0.0 - 1.0 mg/dL    Alkaline Phosphatase 74 40 - 129 U/L    ALT 13 10 - 40 U/L    AST 28 15 - 37 U/L    Globulin 2.4 g/dL   Sample possible blood bank testing   Result Value Ref Range    Specimen Status HOLGER    CBC Auto Differential   Result Value Ref Range    WBC 6.8 4.0 - 11.0 K/uL    RBC 4.53 4.00 - 5.20 M/uL    Hemoglobin 13.6 12.0 - 16.0 g/dL    Hematocrit 40.6 36.0 - 48.0 %    MCV 89.6 80.0 - 100.0 fL    MCH 29.9 26.0 - 34.0 pg    MCHC 33.4 31.0 - 36.0 g/dL    RDW 15.4 12.4 - 15.4 %    Platelets 054 984 - 230 K/uL    MPV 6.6 5.0 - 10.5 fL    Neutrophils % 56.5 %    Lymphocytes % 32.9 %    Monocytes % 7.7 %    Eosinophils % 2.3 %    Basophils % 0.6 %    Neutrophils # 3.9 1.7 - 7.7 K/uL    Lymphocytes # 2.3 1.0 - 5.1 K/uL    Monocytes # 0.5 0.0 - 1.3 K/uL    Eosinophils # 0.2 0.0 - 0.6 K/uL    Basophils # 0.0 0.0 - 0.2 K/uL   Magnesium   Result Value Ref Range    Magnesium 2.10 1.80 - 2.40 mg/dL   Potassium   Result Value Ref Range    Potassium 4.0 3.5 - 5.1 mmol/L       RADIOLOGY  Ct Head Wo Contrast    Result Date: 4/12/2019  EXAMINATION: CT OF THE HEAD WITHOUT CONTRAST  4/12/2019 7:53 pm TECHNIQUE: CT of the head was performed without the administration of intravenous contrast. Dose modulation, iterative reconstruction, and/or weight based adjustment of the mA/kV was utilized to reduce the radiation dose to as low as reasonably achievable.  COMPARISON: Head CT dated 11/13/2014 HISTORY: ORDERING SYSTEM PROVIDED HISTORY: shaking, jerking, leg numbness TECHNOLOGIST PROVIDED HISTORY: If patient is on cardiac monitor and/or pulse ox, they may be taken off cardiac monitor and pulse ox, left on O2 if currently on. All monitors reattached when patient returns to room. Has a \"code stroke\" or \"stroke alert\" been called? ->No Ordering Physician Provided Reason for Exam: head aches and right leg numbness Acuity: Acute Type of Exam: Initial Additional signs and symptoms: hx of stroke FINDINGS: BRAIN/VENTRICLES: There is no acute intracranial hemorrhage, mass effect or midline shift. No abnormal extra-axial fluid collection. The gray-white differentiation is maintained without evidence of an acute infarct. There is no evidence of hydrocephalus. Small old ischemic infarct in the left parietal lobe similar to CT of 2014 ORBITS: The visualized portion of the orbits demonstrate no acute abnormality. SINUSES: The visualized paranasal sinuses and mastoid air cells demonstrate no acute abnormality. SOFT TISSUES/SKULL:  No acute abnormality of the visualized skull or soft tissues. No acute intracranial abnormality. ED COURSE/MDM  Patient seen and evaluated. Old records reviewed. Diagnostic testing reviewed and results discussed. I have seen and evaluated this patient with supervising physician: Fredi Terry MD. We thoroughly discussed the history, physical exam, diagnostic testing, emergency department course, plan and disposition. Bret Mendez presented to the ED today with above noted complaints. Physical exam is grossly normal and without focal or lateralizing deficits on exam.  The only abnormality noted on my exam was at the patient was visibly tremoring in all extremities. CBC is without evidence of systemic infection as there is no leukocytosis or differential shift. H&H is normal and stable. Platelets are within normal limits. CMP is without electrolyte abnormality except for potassium is noted to be 5.6 but specimen hemolysis has occurred. Repeat potassium is normal at 4. No evidence of kidney or liver dysfunction. Magnesium level is normal at 2.1.   CT scan obtained and shows no acute findings. While in ED patient received   Medications   0.9 % sodium chloride bolus (0 mLs Intravenous Stopped 4/12/19 2031)   diphenhydrAMINE (BENADRYL) injection 12.5 mg (12.5 mg Intravenous Given 4/12/19 1915)   metoclopramide (REGLAN) injection 10 mg (10 mg Intravenous Given 4/12/19 1915)   orphenadrine (NORFLEX) injection 60 mg (60 mg Intramuscular Given 4/12/19 2156)   ondansetron (ZOFRAN) injection 4 mg (4 mg Intravenous Given 4/12/19 2157)   prochlorperazine (COMPAZINE) tablet 10 mg (10 mg Oral Given 4/12/19 2157)     Patient was given IV fluids, Benadryl, Reglan for her complaints of headache, she reported it was somewhat better but not significant bleeding improved. Patient told the ED attending physician that she has been given Norflex for headache complaints and this apparently does help with her headaches, she was given that here in the ED as well as Zofran and Compazine. Patient did have improvement of her symptoms and was ready to be discharged. I feel that patient's symptoms are probably more due to a migraine and not a CVA. As the patient does not have any neurologic deficit I feel discharge home with outpatient follow-up with her neurologist is reasonable. Patient was given strict ED return precautions. I estimate there is LOW risk for SUBARACHNOID HEMORRHAGE, MENINGITIS, INTRACRANIAL HEMORRHAGE, SUBDURAL HEMATOMA, OR STROKE, thus I consider the discharge disposition reasonable. Matthew Pink and I have discussed the diagnosis and risks, and we agree with discharging home to follow-up with their primary doctor. We also discussed returning to the Emergency Department immediately if new or worsening symptoms occur. We have discussed the symptoms which are most concerning (e.g., changing or worsening pain, weakness, vomiting, fever) that necessitate immediate return. CLINICAL IMPRESSION  1. Acute nonintractable headache, unspecified headache type    2.  Anxiety

## 2019-04-13 VITALS
WEIGHT: 152 LBS | TEMPERATURE: 98.4 F | HEART RATE: 77 BPM | OXYGEN SATURATION: 96 % | RESPIRATION RATE: 16 BRPM | HEIGHT: 60 IN | SYSTOLIC BLOOD PRESSURE: 104 MMHG | DIASTOLIC BLOOD PRESSURE: 65 MMHG | BODY MASS INDEX: 29.84 KG/M2

## 2019-04-13 ASSESSMENT — PAIN DESCRIPTION - PAIN TYPE
TYPE: ACUTE PAIN
TYPE: ACUTE PAIN

## 2019-04-13 ASSESSMENT — PAIN SCALES - GENERAL
PAINLEVEL_OUTOF10: 5
PAINLEVEL_OUTOF10: 5

## 2019-04-13 ASSESSMENT — PAIN DESCRIPTION - LOCATION
LOCATION: HEAD
LOCATION: HEAD

## 2019-04-13 ASSESSMENT — PAIN DESCRIPTION - DESCRIPTORS: DESCRIPTORS: HEADACHE

## 2019-04-13 NOTE — ED NOTES
RN to patient's room for discharge. Patient c/o continuing headache 9/10 and requests additional medication. Order for PRN morphine active. Will medicate patient per orders.       Uday Kay RN  04/12/19 0107

## 2019-04-13 NOTE — ED NOTES
Patient reports ongoing headache and nausea. No deficits or other symptoms.      Siria Smith RN  04/12/19 6203

## 2019-04-13 NOTE — ED PROVIDER NOTES
Emergency Department Provider Note     Location: [facility]  ED  4/12/2019    I independently performed a history and physical on Tamera Sibley. All diagnostic, treatment, and disposition decisions were made by myself in conjunction with the mid-level provider. Briefly, this is a 60 y.o. female here for multiple symptoms. She first woke up with a HA but that resolved. While at the gym working out, her \"right leg was drifting towards the left leg. \"  Her right leg went numb from knee to foot for about 10 seconds. After that she went home but she still didn't feel quite right. She was resting on the couch feeling very anxious when she started noticing bilateral muscle twitching. Then this evening, her headache gradually got worse. Patient has a history of migraine he used to get Botox injection on tell this year, when her insurance no longer covers the Botox injection. Her PCP prescribe her a muscle relaxant which is helping her headache. Patient reports photophobia and phonophobia throughout the day and evening. She fell and decided to come get evaluated because she was so anxious about it that she thought she would feel better by being here. ED Triage Vitals [04/12/19 1802]   BP Temp Temp Source Pulse Resp SpO2 Height Weight   (!) 148/86 98.4 °F (36.9 °C) Oral 103 18 96 % 5' (1.524 m) 152 lb (68.9 kg)        Exam showed WDWN female NAD, ACOx3, heart RRR, no murmur, lungs clear, no LE edema. Cranial nerves II through XII intact. Sensation intact bilaterally. Motor strength equal bilaterally. Normal coordination.     Patient seen and examined in room 23      Ct Head Wo Contrast    Result Date: 4/12/2019  EXAMINATION: CT OF THE HEAD WITHOUT CONTRAST  4/12/2019 7:53 pm TECHNIQUE: CT of the head was performed without the administration of intravenous contrast. Dose modulation, iterative reconstruction, and/or weight based adjustment of the mA/kV was utilized to reduce the radiation dose to as low as reasonably achievable. COMPARISON: Head CT dated 11/13/2014 HISTORY: ORDERING SYSTEM PROVIDED HISTORY: shaking, jerking, leg numbness TECHNOLOGIST PROVIDED HISTORY: If patient is on cardiac monitor and/or pulse ox, they may be taken off cardiac monitor and pulse ox, left on O2 if currently on. All monitors reattached when patient returns to room. Has a \"code stroke\" or \"stroke alert\" been called? ->No Ordering Physician Provided Reason for Exam: head aches and right leg numbness Acuity: Acute Type of Exam: Initial Additional signs and symptoms: hx of stroke FINDINGS: BRAIN/VENTRICLES: There is no acute intracranial hemorrhage, mass effect or midline shift. No abnormal extra-axial fluid collection. The gray-white differentiation is maintained without evidence of an acute infarct. There is no evidence of hydrocephalus. Small old ischemic infarct in the left parietal lobe similar to CT of 2014 ORBITS: The visualized portion of the orbits demonstrate no acute abnormality. SINUSES: The visualized paranasal sinuses and mastoid air cells demonstrate no acute abnormality. SOFT TISSUES/SKULL:  No acute abnormality of the visualized skull or soft tissues. No acute intracranial abnormality. Lab - no acute abnormality that needs to be addressed (first K hemolyzed; repeat normal)    Pretreated patient's headache symptomatically and she felt better. She was ready to go home. Outpatient follow-up with her neurologist at [facility]. Return for any new neurological symptoms or worsening headache. For further details of [facility] emergency department encounter, please see Vinicio Rdz NP's documentation. This chart was generated in part by using Dragon Dictation system and may contain errors related to that system including errors in grammar, punctuation, and spelling, as well as words and phrases that may be inappropriate.  If there are any questions or concerns please feel free to contact the dictating provider for clarification.           Jane Burt MD  04/13/19 7514

## 2019-05-11 ENCOUNTER — HOSPITAL ENCOUNTER (EMERGENCY)
Age: 59
Discharge: HOME | End: 2019-05-11
Attending: EMERGENCY MEDICINE
Payer: COMMERCIAL

## 2019-05-11 VITALS
RESPIRATION RATE: 16 BRPM | OXYGEN SATURATION: 98 % | HEIGHT: 60 IN | DIASTOLIC BLOOD PRESSURE: 70 MMHG | TEMPERATURE: 98.4 F | WEIGHT: 150 LBS | HEART RATE: 100 BPM | SYSTOLIC BLOOD PRESSURE: 104 MMHG | BODY MASS INDEX: 29.45 KG/M2

## 2019-05-11 DIAGNOSIS — R51.9 CHRONIC NONINTRACTABLE HEADACHE, UNSPECIFIED HEADACHE TYPE: Primary | ICD-10-CM

## 2019-05-11 DIAGNOSIS — G89.29 CHRONIC NONINTRACTABLE HEADACHE, UNSPECIFIED HEADACHE TYPE: Primary | ICD-10-CM

## 2019-05-11 PROCEDURE — 96361 HYDRATE IV INFUSION ADD-ON: CPT

## 2019-05-11 PROCEDURE — 6360000002 HC RX W HCPCS: Performed by: STUDENT IN AN ORGANIZED HEALTH CARE EDUCATION/TRAINING PROGRAM

## 2019-05-11 PROCEDURE — 96372 THER/PROPH/DIAG INJ SC/IM: CPT

## 2019-05-11 PROCEDURE — 2580000003 HC RX 258: Performed by: STUDENT IN AN ORGANIZED HEALTH CARE EDUCATION/TRAINING PROGRAM

## 2019-05-11 PROCEDURE — 96374 THER/PROPH/DIAG INJ IV PUSH: CPT

## 2019-05-11 PROCEDURE — 96375 TX/PRO/DX INJ NEW DRUG ADDON: CPT

## 2019-05-11 PROCEDURE — 99282 EMERGENCY DEPT VISIT SF MDM: CPT

## 2019-05-11 RX ORDER — ONDANSETRON 2 MG/ML
4 INJECTION INTRAMUSCULAR; INTRAVENOUS ONCE
Status: COMPLETED | OUTPATIENT
Start: 2019-05-11 | End: 2019-05-11

## 2019-05-11 RX ORDER — PROCHLORPERAZINE EDISYLATE 5 MG/ML
10 INJECTION INTRAMUSCULAR; INTRAVENOUS ONCE
Status: COMPLETED | OUTPATIENT
Start: 2019-05-11 | End: 2019-05-11

## 2019-05-11 RX ORDER — ONDANSETRON 4 MG/1
4 TABLET, FILM COATED ORAL DAILY PRN
Qty: 30 TABLET | Refills: 0 | Status: ON HOLD | OUTPATIENT
Start: 2019-05-11 | End: 2022-10-22 | Stop reason: HOSPADM

## 2019-05-11 RX ORDER — METOPROLOL SUCCINATE 50 MG/1
50 TABLET, EXTENDED RELEASE ORAL DAILY
Status: ON HOLD | COMMUNITY
End: 2021-01-18 | Stop reason: HOSPADM

## 2019-05-11 RX ORDER — ORPHENADRINE CITRATE 30 MG/ML
60 INJECTION INTRAMUSCULAR; INTRAVENOUS ONCE
Status: COMPLETED | OUTPATIENT
Start: 2019-05-11 | End: 2019-05-11

## 2019-05-11 RX ORDER — SODIUM CHLORIDE 9 MG/ML
1000 INJECTION, SOLUTION INTRAVENOUS CONTINUOUS
Status: DISCONTINUED | OUTPATIENT
Start: 2019-05-11 | End: 2019-05-11 | Stop reason: HOSPADM

## 2019-05-11 RX ADMIN — SODIUM CHLORIDE 1000 ML: 9 INJECTION, SOLUTION INTRAVENOUS at 12:02

## 2019-05-11 RX ADMIN — PROCHLORPERAZINE EDISYLATE 10 MG: 5 INJECTION INTRAMUSCULAR; INTRAVENOUS at 12:02

## 2019-05-11 RX ADMIN — ONDANSETRON 4 MG: 2 INJECTION INTRAMUSCULAR; INTRAVENOUS at 12:55

## 2019-05-11 RX ADMIN — ORPHENADRINE CITRATE 60 MG: 30 INJECTION INTRAMUSCULAR; INTRAVENOUS at 12:55

## 2019-05-11 ASSESSMENT — ENCOUNTER SYMPTOMS
CHEST TIGHTNESS: 0
SHORTNESS OF BREATH: 0
ABDOMINAL PAIN: 0
CONSTIPATION: 0
NAUSEA: 1
DIARRHEA: 0
VOMITING: 0

## 2019-05-11 ASSESSMENT — PAIN SCALES - GENERAL: PAINLEVEL_OUTOF10: 10

## 2019-05-11 NOTE — ED PROVIDER NOTES
Respiratory: Negative for chest tightness and shortness of breath. Cardiovascular: Negative for chest pain. Gastrointestinal: Positive for nausea. Negative for abdominal pain, constipation, diarrhea and vomiting. Genitourinary: Negative for dysuria. Neurological: Positive for headaches. Negative for dizziness, syncope and light-headedness. Except as noted above the remainder of the review of systems was reviewed and negative.        PAST MEDICAL HISTORY     Past Medical History:   Diagnosis Date    Anticoagulant long-term use     Anxiety     Arthritis     Cerebrovascular disease 2/2014    stroke    Depression     Fibromyalgia     Generalized osteoarthritis 3/6/2015    GERD (gastroesophageal reflux disease)     H/O suicide attempt     Headache(784.0)     Hyperlipidemia     Hypertension     Hypothyroidism     Irritable bowel syndrome     Migraines     Obesity     Patent foramen ovale 2/27/2014    PONV (postoperative nausea and vomiting)          SURGICAL HISTORY       Past Surgical History:   Procedure Laterality Date    CHOLECYSTECTOMY  1990's    laparoscopic    COLONOSCOPY  2014    COLONOSCOPY  06/29/2017    random biopsies    COLONOSCOPY  01/23/2017    polyp    HYSTERECTOMY  1980's    total vaginal hysterectomy, retains both ovaries, menorrhagia, fibroid tumors    JOINT REPLACEMENT Left 06/28/2018    LTK     KNEE CARTILAGE SURGERY Right     KNEE SURGERY      THYROIDECTOMY           CURRENT MEDICATIONS       Discharge Medication List as of 5/11/2019  1:29 PM      CONTINUE these medications which have NOT CHANGED    Details   metoprolol succinate (TOPROL XL) 50 MG extended release tablet Take 50 mg by mouth dailyHistorical Med      clopidogrel (PLAVIX) 75 MG tablet Take 75 mg by mouth dailyHistorical Med      topiramate ER (TROKENDI XR) 100 MG CP24 Take by mouthHistorical Med      orphenadrine (NORFLEX) 100 MG extended release tablet Take 100 mg by mouth 2 times sounds. Musculoskeletal:  No clubbing, cyanosis or edema bilaterally. Full range of motion without deformity. Skin: Skin color, texture, turgor normal.  No rashes or lesions. Neurologic:  Neurovascularly intact without any focal sensory/motor deficits. No focal deficits. Psychiatric:  Alert and oriented, thought content appropriate, normal insight    DIAGNOSTIC RESULTS     EKG: N/A      RADIOLOGY:   No orders to display       LABS:  Labs Reviewed - No data to display    All other labs were within normal range ornot returned as of this dictation. EMERGENCY DEPARTMENT COURSE and DIFFERENTIAL DIAGNOSIS/MDM:   Vitals:    Vitals:    05/11/19 1114 05/11/19 1124 05/11/19 1205 05/11/19 1340   BP: 131/76  102/75 104/70   Pulse:  80 97 100   Resp: 16  16 16   Temp: 98.4 °F (36.9 °C)      TempSrc: Oral      SpO2:  98% 98% 98%   Weight: 150 lb (68 kg)      Height: 5' (1.524 m)            [facility]    ED COURSE/MDM    - Jenelle Harp is a 60 y.o. female with a history of chronic headaches presenting for persistent headache.   - Patient appeared overall comfortable with no neurological/motor deficits. No concern for acute CVA. Patient was given a dose of compazine with minimal improvement of her headache. Headache and nausea then improved with a dose of norflex 60mg IM and zofran 4mg PO with good symptomatic relief. - No acute pathology was noted and plan for discharge home with close follow up with PCP was discussed with patient and family. Strict ED return precautions given for new/worsening symptoms. Patient and family in agreement withplan, verbally confirm understanding and have no further questions/concerns. REASSESSMENT      CONSULTS:  None    PROCEDURES:  Unless otherwise noted below, none     Procedures    FINAL IMPRESSION      1.  Chronic nonintractable headache, unspecified headache type          DISPOSITION/PLAN   DISPOSITION Decision To Discharge 05/11/2019 01:25:30 PM      PATIENT REFERREDTO:  Neurologist  Please follow-up with your neurologist within the next 1 week          DISCHARGE MEDICATIONS:  Discharge Medication List as of 5/11/2019  1:29 PM      START taking these medications    Details   ondansetron (ZOFRAN) 4 MG tablet Take 1 tablet by mouth daily as needed for Nausea or Vomiting, Disp-30 tablet, R-0Print                The patient was discussed and seen with attending provider. Marion Mantilla D.O.   [facility] - Family Medicine Resident  PGY-1            Delilah Harper DO  Resident  05/11/19 1464

## 2019-05-21 NOTE — ED PROVIDER NOTES
immediately if new or worsening symptoms occur. We have discussed the symptoms which are most concerning (e.g., changing or worsening pain, weakness, vomiting, fever) that necessitate immediate return.     Final Impression     1. Chronic nonintractable headache, unspecified headache type          Discharge Vital Signs:  Blood pressure 102/75, pulse 97, temperature 98.4 °F (36.9 °C), temperature source Oral, resp. rate 16, height 5' (1.524 m), weight 150 lb (68 kg), SpO2 98 %, not currently breastfeeding.           Hola Perry MD  05/11/19 0204        Hola Perry MD  05/15/19 1403             Revision History                                  No results found for this visit on 05/11/19. I estimate there is LOW risk for SUBARACHNOID HEMORRHAGE, MENINGITIS, INTRACRANIAL HEMORRHAGE, SUBDURAL HEMATOMA, OR STROKE, thus I consider the discharge disposition reasonable. Hong Cunningham and I have discussed the diagnosis and risks, and we agree with discharging home to follow-up with their primary doctor. We also discussed returning to the Emergency Department immediately if new or worsening symptoms occur. We have discussed the symptoms which are most concerning (e.g., changing or worsening pain, weakness, vomiting, fever) that necessitate immediate return. Final Impression    1. Chronic nonintractable headache, unspecified headache type        Discharge Vital Signs:  Blood pressure 104/70, pulse 100, temperature 98.4 °F (36.9 °C), temperature source Oral, resp. rate 16, height 5' (1.524 m), weight 150 lb (68 kg), SpO2 98 %, not currently breastfeeding.          Hola Perry MD  05/21/19 8706

## 2019-05-30 RX ORDER — LEVOTHYROXINE SODIUM 137 UG/1
137 TABLET ORAL DAILY
Qty: 90 TABLET | Refills: 3 | Status: SHIPPED | OUTPATIENT
Start: 2019-05-30 | End: 2019-08-01

## 2019-07-31 ENCOUNTER — OFFICE VISIT (OUTPATIENT)
Dept: ENDOCRINOLOGY | Age: 59
End: 2019-07-31
Payer: COMMERCIAL

## 2019-07-31 ENCOUNTER — HOSPITAL ENCOUNTER (OUTPATIENT)
Age: 59
Discharge: HOME | End: 2019-07-31
Payer: COMMERCIAL

## 2019-07-31 VITALS
SYSTOLIC BLOOD PRESSURE: 120 MMHG | BODY MASS INDEX: 31.49 KG/M2 | HEART RATE: 95 BPM | DIASTOLIC BLOOD PRESSURE: 77 MMHG | HEIGHT: 60 IN | OXYGEN SATURATION: 98 % | WEIGHT: 160.4 LBS

## 2019-07-31 DIAGNOSIS — E03.9 ACQUIRED HYPOTHYROIDISM: Primary | ICD-10-CM

## 2019-07-31 DIAGNOSIS — E03.9 ACQUIRED HYPOTHYROIDISM: ICD-10-CM

## 2019-07-31 LAB — TSH SERPL DL<=0.05 MIU/L-ACNC: 0.06 UIU/ML (ref 0.27–4.2)

## 2019-07-31 PROCEDURE — 36415 COLL VENOUS BLD VENIPUNCTURE: CPT

## 2019-07-31 PROCEDURE — 84443 ASSAY THYROID STIM HORMONE: CPT

## 2019-07-31 PROCEDURE — 99213 OFFICE O/P EST LOW 20 MIN: CPT | Performed by: INTERNAL MEDICINE

## 2019-08-01 DIAGNOSIS — E03.9 ACQUIRED HYPOTHYROIDISM: Primary | ICD-10-CM

## 2019-08-01 RX ORDER — LEVOTHYROXINE SODIUM 0.12 MG/1
125 TABLET ORAL DAILY
Qty: 90 TABLET | Refills: 2 | Status: SHIPPED | OUTPATIENT
Start: 2019-08-01 | End: 2020-03-10

## 2019-09-12 RX ORDER — CLINDAMYCIN HYDROCHLORIDE 150 MG/1
CAPSULE ORAL
Qty: 8 CAPSULE | Refills: 0 | Status: ON HOLD | OUTPATIENT
Start: 2019-09-12 | End: 2021-01-18 | Stop reason: HOSPADM

## 2019-10-17 ENCOUNTER — HOSPITAL ENCOUNTER (OUTPATIENT)
Age: 59
Discharge: HOME | End: 2019-10-17
Payer: COMMERCIAL

## 2019-10-17 DIAGNOSIS — E03.9 ACQUIRED HYPOTHYROIDISM: ICD-10-CM

## 2019-10-17 LAB
T4 FREE: 1.4 NG/DL (ref 0.9–1.8)
TSH SERPL DL<=0.05 MIU/L-ACNC: 1.6 UIU/ML (ref 0.27–4.2)

## 2019-10-17 PROCEDURE — 84443 ASSAY THYROID STIM HORMONE: CPT

## 2019-10-17 PROCEDURE — 36415 COLL VENOUS BLD VENIPUNCTURE: CPT

## 2019-10-17 PROCEDURE — 84439 ASSAY OF FREE THYROXINE: CPT

## 2020-03-10 RX ORDER — LEVOTHYROXINE SODIUM 0.12 MG/1
TABLET ORAL
Qty: 90 TABLET | Refills: 2 | Status: SHIPPED | OUTPATIENT
Start: 2020-03-10 | End: 2020-12-10

## 2020-03-10 NOTE — TELEPHONE ENCOUNTER
LOV: 7/31/2019    NOV: NONE     DOSE: 125 mcg     Frequency: Daily     Pharmacy as Pended:     Per LOV Note: Continue levothyroxine 125 mcg daily    Per Last PHONE NOTE:         Lab Results   Component Value Date    TSH 1.60 10/17/2019    FT3 2.5 11/04/2015    T4FREE 1.4 10/17/2019

## 2020-10-06 ENCOUNTER — TELEPHONE (OUTPATIENT)
Dept: OBGYN CLINIC | Age: 60
End: 2020-10-06

## 2020-10-07 ENCOUNTER — OFFICE VISIT (OUTPATIENT)
Dept: OBGYN CLINIC | Age: 60
End: 2020-10-07
Payer: COMMERCIAL

## 2020-10-07 VITALS
WEIGHT: 172.2 LBS | HEIGHT: 61 IN | BODY MASS INDEX: 32.51 KG/M2 | SYSTOLIC BLOOD PRESSURE: 140 MMHG | DIASTOLIC BLOOD PRESSURE: 88 MMHG | TEMPERATURE: 98.4 F | HEART RATE: 74 BPM

## 2020-10-07 PROCEDURE — 99386 PREV VISIT NEW AGE 40-64: CPT | Performed by: OBSTETRICS & GYNECOLOGY

## 2020-10-07 RX ORDER — ROSUVASTATIN CALCIUM 40 MG/1
40 TABLET, COATED ORAL EVERY EVENING
COMMUNITY

## 2020-10-07 RX ORDER — OLANZAPINE 5 MG/1
5 TABLET, ORALLY DISINTEGRATING ORAL NIGHTLY PRN
Status: ON HOLD | COMMUNITY
End: 2021-01-18 | Stop reason: HOSPADM

## 2020-10-07 RX ORDER — PANTOPRAZOLE SODIUM 40 MG/1
40 GRANULE, DELAYED RELEASE ORAL
COMMUNITY

## 2020-10-07 RX ORDER — ATENOLOL 25 MG/1
50 TABLET ORAL DAILY
COMMUNITY

## 2020-10-11 PROBLEM — E66.9 OBESITY (BMI 30-39.9): Status: ACTIVE | Noted: 2020-10-11

## 2020-10-11 ASSESSMENT — ENCOUNTER SYMPTOMS
ABDOMINAL DISTENTION: 0
ABDOMINAL PAIN: 0
SHORTNESS OF BREATH: 0
NAUSEA: 0
DIARRHEA: 1
CONSTIPATION: 1
VOMITING: 0

## 2020-10-12 NOTE — PROGRESS NOTES
Last PAP was normal; January/2016. Abnormal pap history? no  Last HPV screen:   Mammogram was normal, October/2020.   Last Dexa Scan: N/A   Contraceptive method: None  Last colonoscopy was 2018 Normal
Breath sounds: Normal breath sounds. Chest:      Breasts: Breasts are symmetrical.         Right: No inverted nipple, mass, nipple discharge, skin change or tenderness. Left: No inverted nipple, mass, nipple discharge, skin change or tenderness. Abdominal:      General: Bowel sounds are normal. There is no distension. Palpations: Abdomen is soft. Abdomen is not rigid. There is no mass. Tenderness: There is no abdominal tenderness. There is no guarding or rebound. Genitourinary:     General: Normal vulva. Exam position: Lithotomy position. Labia:         Right: No rash, tenderness, lesion or injury. Left: No rash, tenderness, lesion or injury. Vagina: No signs of injury. No vaginal discharge, erythema, tenderness or bleeding. Uterus: Absent. Adnexa:         Right: No mass, tenderness or fullness. Left: No mass, tenderness or fullness. Rectum: Guaiac result negative. Musculoskeletal: Normal range of motion. Skin:     General: Skin is warm and dry. Findings: No erythema or rash. Nails: There is no clubbing. Neurological:      Mental Status: She is alert and oriented to person, place, and time. Psychiatric:         Speech: Speech normal.         Behavior: Behavior normal. Behavior is cooperative. Thought Content: Thought content normal.         Judgment: Judgment normal.         Assessment:       Diagnosis Orders   1. Well woman exam with routine gynecological exam     2. Pap smear of cervix not needed     3. Vaginal atrophy     4. Menopause     5. History of total vaginal hysterectomy     6. Obesity (BMI 30-39. 9)             Plan:      Follow up prn and 1 year.            Sami Eller DO

## 2020-12-10 RX ORDER — LEVOTHYROXINE SODIUM 0.12 MG/1
TABLET ORAL
Qty: 90 TABLET | Refills: 0 | Status: SHIPPED | OUTPATIENT
Start: 2020-12-10

## 2021-01-17 ENCOUNTER — APPOINTMENT (OUTPATIENT)
Dept: GENERAL RADIOLOGY | Age: 61
End: 2021-01-17
Payer: MEDICARE

## 2021-01-17 ENCOUNTER — APPOINTMENT (OUTPATIENT)
Dept: CT IMAGING | Age: 61
End: 2021-01-17
Payer: MEDICARE

## 2021-01-17 ENCOUNTER — HOSPITAL ENCOUNTER (OUTPATIENT)
Age: 61
Setting detail: OBSERVATION
Discharge: HOME | End: 2021-01-18
Attending: EMERGENCY MEDICINE | Admitting: HOSPITALIST
Payer: MEDICARE

## 2021-01-17 DIAGNOSIS — R20.0 RIGHT FACIAL NUMBNESS: Primary | ICD-10-CM

## 2021-01-17 DIAGNOSIS — R51.9 NONINTRACTABLE HEADACHE, UNSPECIFIED CHRONICITY PATTERN, UNSPECIFIED HEADACHE TYPE: ICD-10-CM

## 2021-01-17 DIAGNOSIS — R42 DIZZINESS: ICD-10-CM

## 2021-01-17 PROBLEM — Z86.73 HISTORY OF CVA IN ADULTHOOD: Status: ACTIVE | Noted: 2021-01-17

## 2021-01-17 LAB
A/G RATIO: 1.6 (ref 1.1–2.2)
ALBUMIN SERPL-MCNC: 5.4 G/DL (ref 3.4–5)
ALP BLD-CCNC: 110 U/L (ref 40–129)
ALT SERPL-CCNC: 19 U/L (ref 10–40)
ANION GAP SERPL CALCULATED.3IONS-SCNC: 14 MMOL/L (ref 3–16)
APTT: 31.8 SEC (ref 24.2–36.2)
AST SERPL-CCNC: 43 U/L (ref 15–37)
BASOPHILS ABSOLUTE: 0.1 K/UL (ref 0–0.2)
BASOPHILS RELATIVE PERCENT: 0.4 %
BILIRUB SERPL-MCNC: 0.5 MG/DL (ref 0–1)
BILIRUBIN URINE: NEGATIVE
BLOOD, URINE: NEGATIVE
BUN BLDV-MCNC: 21 MG/DL (ref 7–20)
CALCIUM SERPL-MCNC: 10.5 MG/DL (ref 8.3–10.6)
CHLORIDE BLD-SCNC: 93 MMOL/L (ref 99–110)
CLARITY: CLEAR
CO2: 23 MMOL/L (ref 21–32)
COLOR: NORMAL
CREAT SERPL-MCNC: 1 MG/DL (ref 0.6–1.2)
EOSINOPHILS ABSOLUTE: 0 K/UL (ref 0–0.6)
EOSINOPHILS RELATIVE PERCENT: 0.1 %
GFR AFRICAN AMERICAN: >60
GFR NON-AFRICAN AMERICAN: 56
GLOBULIN: 3.3 G/DL
GLUCOSE BLD-MCNC: 93 MG/DL (ref 70–99)
GLUCOSE BLD-MCNC: 96 MG/DL (ref 70–99)
GLUCOSE URINE: NEGATIVE MG/DL
HCT VFR BLD CALC: 46.8 % (ref 36–48)
HEMOGLOBIN: 15.5 G/DL (ref 12–16)
INR BLD: 0.93 (ref 0.86–1.14)
KETONES, URINE: NEGATIVE MG/DL
LEUKOCYTE ESTERASE, URINE: NEGATIVE
LYMPHOCYTES ABSOLUTE: 1.5 K/UL (ref 1–5.1)
LYMPHOCYTES RELATIVE PERCENT: 12.3 %
MCH RBC QN AUTO: 30.4 PG (ref 26–34)
MCHC RBC AUTO-ENTMCNC: 33.2 G/DL (ref 31–36)
MCV RBC AUTO: 91.8 FL (ref 80–100)
MICROSCOPIC EXAMINATION: NORMAL
MONOCYTES ABSOLUTE: 0.4 K/UL (ref 0–1.3)
MONOCYTES RELATIVE PERCENT: 3.2 %
NEUTROPHILS ABSOLUTE: 10.1 K/UL (ref 1.7–7.7)
NEUTROPHILS RELATIVE PERCENT: 84 %
NITRITE, URINE: NEGATIVE
PDW BLD-RTO: 14.5 % (ref 12.4–15.4)
PERFORMED ON: NORMAL
PH UA: 6 (ref 5–8)
PLATELET # BLD: 236 K/UL (ref 135–450)
PMV BLD AUTO: 7.4 FL (ref 5–10.5)
POTASSIUM REFLEX MAGNESIUM: 5.7 MMOL/L (ref 3.5–5.1)
POTASSIUM SERPL-SCNC: 4.3 MMOL/L (ref 3.5–5.1)
PRO-BNP: 344 PG/ML (ref 0–124)
PROTEIN UA: NEGATIVE MG/DL
PROTHROMBIN TIME: 10.8 SEC (ref 10–13.2)
RBC # BLD: 5.1 M/UL (ref 4–5.2)
SODIUM BLD-SCNC: 130 MMOL/L (ref 136–145)
SPECIFIC GRAVITY UA: <=1.005 (ref 1–1.03)
TOTAL PROTEIN: 8.7 G/DL (ref 6.4–8.2)
TROPONIN: <0.01 NG/ML
TROPONIN: <0.01 NG/ML
URINE REFLEX TO CULTURE: NORMAL
URINE TYPE: NORMAL
UROBILINOGEN, URINE: 0.2 E.U./DL
WBC # BLD: 12.1 K/UL (ref 4–11)

## 2021-01-17 PROCEDURE — 85025 COMPLETE CBC W/AUTO DIFF WBC: CPT

## 2021-01-17 PROCEDURE — 96375 TX/PRO/DX INJ NEW DRUG ADDON: CPT

## 2021-01-17 PROCEDURE — G0378 HOSPITAL OBSERVATION PER HR: HCPCS

## 2021-01-17 PROCEDURE — 80053 COMPREHEN METABOLIC PANEL: CPT

## 2021-01-17 PROCEDURE — 2500000003 HC RX 250 WO HCPCS: Performed by: HOSPITALIST

## 2021-01-17 PROCEDURE — 94761 N-INVAS EAR/PLS OXIMETRY MLT: CPT

## 2021-01-17 PROCEDURE — 99285 EMERGENCY DEPT VISIT HI MDM: CPT

## 2021-01-17 PROCEDURE — 2700000000 HC OXYGEN THERAPY PER DAY

## 2021-01-17 PROCEDURE — 96374 THER/PROPH/DIAG INJ IV PUSH: CPT

## 2021-01-17 PROCEDURE — 83880 ASSAY OF NATRIURETIC PEPTIDE: CPT

## 2021-01-17 PROCEDURE — 2580000003 HC RX 258: Performed by: HOSPITALIST

## 2021-01-17 PROCEDURE — 84484 ASSAY OF TROPONIN QUANT: CPT

## 2021-01-17 PROCEDURE — 6360000004 HC RX CONTRAST MEDICATION: Performed by: EMERGENCY MEDICINE

## 2021-01-17 PROCEDURE — 85730 THROMBOPLASTIN TIME PARTIAL: CPT

## 2021-01-17 PROCEDURE — 84132 ASSAY OF SERUM POTASSIUM: CPT

## 2021-01-17 PROCEDURE — 71045 X-RAY EXAM CHEST 1 VIEW: CPT

## 2021-01-17 PROCEDURE — 6370000000 HC RX 637 (ALT 250 FOR IP): Performed by: HOSPITALIST

## 2021-01-17 PROCEDURE — 96372 THER/PROPH/DIAG INJ SC/IM: CPT

## 2021-01-17 PROCEDURE — 6370000000 HC RX 637 (ALT 250 FOR IP): Performed by: NURSE PRACTITIONER

## 2021-01-17 PROCEDURE — 70450 CT HEAD/BRAIN W/O DYE: CPT

## 2021-01-17 PROCEDURE — 70496 CT ANGIOGRAPHY HEAD: CPT

## 2021-01-17 PROCEDURE — 6370000000 HC RX 637 (ALT 250 FOR IP): Performed by: EMERGENCY MEDICINE

## 2021-01-17 PROCEDURE — 81003 URINALYSIS AUTO W/O SCOPE: CPT

## 2021-01-17 PROCEDURE — 93005 ELECTROCARDIOGRAM TRACING: CPT | Performed by: EMERGENCY MEDICINE

## 2021-01-17 PROCEDURE — 85610 PROTHROMBIN TIME: CPT

## 2021-01-17 PROCEDURE — 6360000002 HC RX W HCPCS: Performed by: EMERGENCY MEDICINE

## 2021-01-17 PROCEDURE — 36415 COLL VENOUS BLD VENIPUNCTURE: CPT

## 2021-01-17 PROCEDURE — 6360000002 HC RX W HCPCS: Performed by: HOSPITALIST

## 2021-01-17 PROCEDURE — 2580000003 HC RX 258: Performed by: EMERGENCY MEDICINE

## 2021-01-17 RX ORDER — BUTALBITAL, ACETAMINOPHEN AND CAFFEINE 50; 325; 40 MG/1; MG/1; MG/1
1 TABLET ORAL EVERY 4 HOURS PRN
Status: DISCONTINUED | OUTPATIENT
Start: 2021-01-17 | End: 2021-01-18

## 2021-01-17 RX ORDER — BUTALBITAL, ACETAMINOPHEN AND CAFFEINE 50; 325; 40 MG/1; MG/1; MG/1
1 TABLET ORAL EVERY 4 HOURS PRN
Status: DISCONTINUED | OUTPATIENT
Start: 2021-01-17 | End: 2021-01-17

## 2021-01-17 RX ORDER — 0.9 % SODIUM CHLORIDE 0.9 %
1000 INTRAVENOUS SOLUTION INTRAVENOUS ONCE
Status: COMPLETED | OUTPATIENT
Start: 2021-01-17 | End: 2021-01-17

## 2021-01-17 RX ORDER — ASPIRIN 81 MG/1
81 TABLET ORAL DAILY
Status: DISCONTINUED | OUTPATIENT
Start: 2021-01-17 | End: 2021-01-17

## 2021-01-17 RX ORDER — SODIUM CHLORIDE 0.9 % (FLUSH) 0.9 %
10 SYRINGE (ML) INJECTION EVERY 12 HOURS SCHEDULED
Status: DISCONTINUED | OUTPATIENT
Start: 2021-01-17 | End: 2021-01-18 | Stop reason: HOSPADM

## 2021-01-17 RX ORDER — DIPHENHYDRAMINE HYDROCHLORIDE 50 MG/ML
25 INJECTION INTRAMUSCULAR; INTRAVENOUS ONCE
Status: COMPLETED | OUTPATIENT
Start: 2021-01-17 | End: 2021-01-17

## 2021-01-17 RX ORDER — ROSUVASTATIN CALCIUM 10 MG/1
40 TABLET, COATED ORAL EVERY EVENING
Status: DISCONTINUED | OUTPATIENT
Start: 2021-01-17 | End: 2021-01-18 | Stop reason: HOSPADM

## 2021-01-17 RX ORDER — ONDANSETRON 2 MG/ML
4 INJECTION INTRAMUSCULAR; INTRAVENOUS EVERY 6 HOURS PRN
Status: DISCONTINUED | OUTPATIENT
Start: 2021-01-17 | End: 2021-01-18 | Stop reason: HOSPADM

## 2021-01-17 RX ORDER — ACETAMINOPHEN 325 MG/1
650 TABLET ORAL EVERY 4 HOURS PRN
Status: DISCONTINUED | OUTPATIENT
Start: 2021-01-17 | End: 2021-01-18 | Stop reason: HOSPADM

## 2021-01-17 RX ORDER — LABETALOL HYDROCHLORIDE 5 MG/ML
10 INJECTION, SOLUTION INTRAVENOUS EVERY 10 MIN PRN
Status: DISCONTINUED | OUTPATIENT
Start: 2021-01-17 | End: 2021-01-18 | Stop reason: HOSPADM

## 2021-01-17 RX ORDER — METOPROLOL SUCCINATE 50 MG/1
50 TABLET, EXTENDED RELEASE ORAL DAILY
Status: DISCONTINUED | OUTPATIENT
Start: 2021-01-18 | End: 2021-01-18 | Stop reason: HOSPADM

## 2021-01-17 RX ORDER — ACETAMINOPHEN 650 MG/1
650 SUPPOSITORY RECTAL EVERY 4 HOURS PRN
Status: DISCONTINUED | OUTPATIENT
Start: 2021-01-17 | End: 2021-01-18 | Stop reason: HOSPADM

## 2021-01-17 RX ORDER — SODIUM CHLORIDE 0.9 % (FLUSH) 0.9 %
10 SYRINGE (ML) INJECTION PRN
Status: DISCONTINUED | OUTPATIENT
Start: 2021-01-17 | End: 2021-01-18 | Stop reason: HOSPADM

## 2021-01-17 RX ORDER — PANTOPRAZOLE SODIUM 40 MG/1
40 TABLET, DELAYED RELEASE ORAL
Status: DISCONTINUED | OUTPATIENT
Start: 2021-01-18 | End: 2021-01-18 | Stop reason: HOSPADM

## 2021-01-17 RX ORDER — CLOPIDOGREL BISULFATE 75 MG/1
75 TABLET ORAL DAILY
Status: DISCONTINUED | OUTPATIENT
Start: 2021-01-17 | End: 2021-01-18 | Stop reason: HOSPADM

## 2021-01-17 RX ORDER — PROMETHAZINE HYDROCHLORIDE 25 MG/1
12.5 TABLET ORAL EVERY 6 HOURS PRN
Status: DISCONTINUED | OUTPATIENT
Start: 2021-01-17 | End: 2021-01-18 | Stop reason: HOSPADM

## 2021-01-17 RX ORDER — VITAMIN B COMPLEX
2000 TABLET ORAL DAILY
Status: DISCONTINUED | OUTPATIENT
Start: 2021-01-17 | End: 2021-01-18 | Stop reason: HOSPADM

## 2021-01-17 RX ORDER — LEVOTHYROXINE SODIUM 0.12 MG/1
125 TABLET ORAL DAILY
Status: DISCONTINUED | OUTPATIENT
Start: 2021-01-17 | End: 2021-01-18 | Stop reason: HOSPADM

## 2021-01-17 RX ORDER — DULOXETIN HYDROCHLORIDE 60 MG/1
60 CAPSULE, DELAYED RELEASE ORAL DAILY
Status: DISCONTINUED | OUTPATIENT
Start: 2021-01-17 | End: 2021-01-18 | Stop reason: HOSPADM

## 2021-01-17 RX ORDER — ASPIRIN 300 MG/1
300 SUPPOSITORY RECTAL DAILY
Status: DISCONTINUED | OUTPATIENT
Start: 2021-01-17 | End: 2021-01-17

## 2021-01-17 RX ORDER — PROCHLORPERAZINE EDISYLATE 5 MG/ML
10 INJECTION INTRAMUSCULAR; INTRAVENOUS ONCE
Status: COMPLETED | OUTPATIENT
Start: 2021-01-17 | End: 2021-01-17

## 2021-01-17 RX ORDER — POLYETHYLENE GLYCOL 3350 17 G/17G
17 POWDER, FOR SOLUTION ORAL DAILY PRN
Status: DISCONTINUED | OUTPATIENT
Start: 2021-01-17 | End: 2021-01-18 | Stop reason: HOSPADM

## 2021-01-17 RX ORDER — CLONAZEPAM 0.5 MG/1
0.5 TABLET ORAL 2 TIMES DAILY PRN
Status: DISCONTINUED | OUTPATIENT
Start: 2021-01-17 | End: 2021-01-18 | Stop reason: HOSPADM

## 2021-01-17 RX ADMIN — ASPIRIN 81 MG: 81 TABLET, COATED ORAL at 17:58

## 2021-01-17 RX ADMIN — Medication 10 ML: at 19:42

## 2021-01-17 RX ADMIN — BUTALBITAL, ACETAMINOPHEN, AND CAFFEINE 1 TABLET: 50; 325; 40 TABLET ORAL at 22:30

## 2021-01-17 RX ADMIN — ACETAMINOPHEN 650 MG: 325 TABLET ORAL at 17:58

## 2021-01-17 RX ADMIN — IOPAMIDOL 75 ML: 755 INJECTION, SOLUTION INTRAVENOUS at 13:29

## 2021-01-17 RX ADMIN — PROCHLORPERAZINE EDISYLATE 10 MG: 5 INJECTION INTRAMUSCULAR; INTRAVENOUS at 14:23

## 2021-01-17 RX ADMIN — ENOXAPARIN SODIUM 40 MG: 40 INJECTION SUBCUTANEOUS at 17:58

## 2021-01-17 RX ADMIN — DIPHENHYDRAMINE HYDROCHLORIDE 25 MG: 50 INJECTION, SOLUTION INTRAMUSCULAR; INTRAVENOUS at 14:22

## 2021-01-17 RX ADMIN — BUTALBITAL, ACETAMINOPHEN, AND CAFFEINE 1 TABLET: 50; 325; 40 TABLET ORAL at 14:23

## 2021-01-17 RX ADMIN — FAMOTIDINE 20 MG: 10 INJECTION, SOLUTION INTRAVENOUS at 17:58

## 2021-01-17 RX ADMIN — SODIUM CHLORIDE 1000 ML: 9 INJECTION, SOLUTION INTRAVENOUS at 14:22

## 2021-01-17 ASSESSMENT — PAIN SCALES - GENERAL
PAINLEVEL_OUTOF10: 8
PAINLEVEL_OUTOF10: 8

## 2021-01-17 ASSESSMENT — PAIN DESCRIPTION - LOCATION: LOCATION: HEAD

## 2021-01-17 ASSESSMENT — PAIN DESCRIPTION - PAIN TYPE: TYPE: ACUTE PAIN

## 2021-01-17 NOTE — ED NOTES
Bedside report given to Philly Tovar. [facility] called for verification pt on tele. Full NIHSS performed at handoff. Pt transported via wheelchair. All personal belongings sent with patient to the floor.       Humberto Sanchez RN  01/17/21 4984

## 2021-01-17 NOTE — ED NOTES
Patient ambulated from bed to scale, gait slightly unsteady without any ambulatory aid. She is now back in bed. Call light in reach. Denies any further needs at this time.  Will continue to monitor      [facility], RN  01/17/21 4408

## 2021-01-17 NOTE — ED NOTES
Per MD request Stroke team requested IP telestroke assessment. Patient speaking with Stroke Team via [address].       Zenia Zuniga RN  01/17/21 8704

## 2021-01-17 NOTE — ED NOTES
Pt describes her headache like a \"stretchy cap\" around her head. Complete neuro assessment via tele stroke including patient walking to be side scale and back. Patient speaking about assessment and plan of care with provider on ipad.       Usman Levine RN  01/17/21 0959

## 2021-01-17 NOTE — PROGRESS NOTES
01/17/21 @ [phone] -Neurology consult completed at this time and Dr. Concepción Hampton added to treatment team.    -Fabián Roth, PCA

## 2021-01-17 NOTE — ED NOTES
Patient's jewelry (ear rings and necklace) were placed in a specimen cup and given to patient's  when pt cell phone was dropped off. No  was dropped off with cell phone. Patient up to bedside commode, assist x 1. Specimen collected and sent to lab.       Karolina Jacobson RN  01/17/21 2562

## 2021-01-17 NOTE — ED NOTES
Spoke with Lena Ragsdale, patient's  giving him an update and a plan of care for patient.       Chalo Stoner RN  01/17/21 9878

## 2021-01-17 NOTE — H&P
HOSPITALISTS HISTORY AND PHYSICAL    1/17/2021 8:51 PM    Patient Information:  Milton Mcgowan is a 63 y.o. female [id number]  PCP:  No primary care provider on file. (Tel: None )    Chief complaint:    Chief Complaint   Patient presents with    Headache     headache; flashing lights/fuzziness in both eyes; right sided facial numbness; and feels off balance. sx started 1100 at [facility]        History of Present Illness:  Dulce Henry is a 63 y.o. female who presented to the ED with complaints of bitemporal headache, visual disturbance, right facial paresthesia, and subjective vertigo ongoing for several hours prior to arrival.  The patient has a remote history of cerebral infarct and was concerned that the symptoms may be due to acute CVA. Stat head CT as well as CTA of head/neck were performed revealing no acute intracranial abnormality. Stable encephalomalacia in the left parietal region consistent with remote infarct was once again noted. Labs were obtained and notable for hyponatremia and acute hyperkalemia with diminished GFR. The patient reports that her PCP has recently changed her antihypertensive regimen due to accelerated blood pressure readings. She also reports that she has chronic daily headaches which she treats with Flexeril and Ubrelvy. Patient has underlying diagnosis of fibromyalgia for which she is prescribed Klonopin, Lamictal, Zyprexa, and high dose Cymbalta. History obtained from patient and review of [facility] chart  Old medical records show patient has a history of patent foramen ovale. It is unclear if this medical diagnosis was responsible for her remote CVA. REVIEW OF SYSTEMS:   Constitutional: Negative for fever,chills or night sweats  ENT: Negative for rhinorrhea, epistaxis, hoarseness, sore throat.   Respiratory: Negative for shortness of breath,wheezing Cardiovascular: Negative for chest pain, palpitations   Gastrointestinal: Negative for nausea, vomiting, diarrhea  Genitourinary: Negative for polyuria, dysuria   Hematologic/Lymphatic: Negative for bleeding tendency, easy bruising  Musculoskeletal: Positive for myalgias and arthralgias chronically  Neurologic: Recurrent daily headaches both tension and migrainous; subjective vertigo  Skin: Negative for itching,rash  Psychiatric: Positive for depression, anxiety and fibromyalgia/chronic pain syndrome  Endocrine: Negative for polydipsia,polyuria,heat /cold intolerance. Past Medical History:   has a past medical history of Anticoagulant long-term use, Anxiety, Arthritis, Cerebrovascular disease, Depression, Endometriosis, Fibromyalgia, Generalized osteoarthritis, GERD (gastroesophageal reflux disease), H/O suicide attempt, Headache(784.0), Hyperlipidemia, Hypertension, Hypothyroidism, Irritable bowel syndrome, Migraines, Obesity, Patent foramen ovale, and PONV (postoperative nausea and vomiting). Past Surgical History:   has a past surgical history that includes Cholecystectomy (1990's); Hysterectomy (1980's); Thyroidectomy; Knee cartilage surgery (Right); Colonoscopy (2014); Colonoscopy (06/29/2017); Colonoscopy (01/23/2017); joint replacement (Left, 06/28/2018); and knee surgery. Medications:  No current facility-administered medications on file prior to encounter.       Current Outpatient Medications on File Prior to Encounter   Medication Sig Dispense Refill    levothyroxine (SYNTHROID) 125 MCG tablet TAKE 1 TABLET BY MOUTH EVERY DAY 90 tablet 0    atenolol (TENORMIN) 25 MG tablet Take 25 mg by mouth daily      OLANZapine zydis (ZYPREXA) 5 MG disintegrating tablet Take 5 mg by mouth nightly as needed      pantoprazole sodium (PROTONIX) 40 MG PACK packet Take 40 mg by mouth every morning (before breakfast)      rosuvastatin (CRESTOR) 40 MG tablet Take 40 mg by mouth every evening  Ubrogepant 50 MG TABS Take 50 mg by mouth      Ubrogepant 100 MG TABS Take 100 mg by mouth as needed (up to one dose)      clindamycin (CLEOCIN) 150 MG capsule TAKE 4 CAPSULES BY MOUTH 1 HOUR BEFORE DENTAL PROCEDURE (Patient not taking: Reported on 10/7/2020) 8 capsule 0    metoprolol succinate (TOPROL XL) 50 MG extended release tablet Take 50 mg by mouth daily      ondansetron (ZOFRAN) 4 MG tablet Take 1 tablet by mouth daily as needed for Nausea or Vomiting 30 tablet 0    clopidogrel (PLAVIX) 75 MG tablet Take 75 mg by mouth daily      orphenadrine (NORFLEX) 100 MG extended release tablet Take 100 mg by mouth 2 times daily as needed       promethazine (PHENERGAN) 25 MG tablet Take 25 mg by mouth every 6 hours as needed for Nausea      diclofenac sodium (VOLTAREN) 1 % GEL Apply 4 g topically 3 times daily as needed for Pain Apply to LEFT knee 1 Tube 1    lamoTRIgine (LAMICTAL) 150 MG tablet Take 150 mg by mouth daily      DULoxetine (CYMBALTA) 60 MG capsule Take 2 capsules by mouth daily 120 capsule 3    Cholecalciferol (VITAMIN D) 2000 UNITS CAPS capsule Take 1 capsule by mouth daily      clonazePAM (KLONOPIN) 0.5 MG tablet Take 1 tablet by mouth 2 times daily as needed 60 tablet 0    omeprazole (PRILOSEC) 40 MG capsule TAKE 1 CAPSULE BY MOUTH DAILY (Patient not taking: Reported on 10/7/2020) 90 capsule 3       Allergies: Allergies   Allergen Reactions    Latex Other (See Comments)     Turns skin really red with use of latex gloves    Codeine Hives     Pt can take Norco with Benadryl    Ketorolac     Ketorolac Tromethamine     Kiwi Extract Itching    Nsaids Other (See Comments)     \"tear up my stomach\"    Penicillins Hives    Tolmetin         Social History:  Patient Lives  reports that she has never smoked. She has never used smokeless tobacco. She reports current alcohol use of about 2.0 standard drinks of alcohol per week. She reports that she does not use drugs.      Family History: family history includes Arthritis in her father; Cancer in her father; Clotting Disorder in her mother; Heart Disease in her brother and father; Obesity in her brother; Other in her mother; Substance Abuse in her brother. Physical Exam:  /73   Pulse 81   Temp 98.6 °F (37 °C) (Oral)   Resp 18   Ht 5' 1\" (1.549 m)   Wt 171 lb 8 oz (77.8 kg)   SpO2 95%   BMI 32.40 kg/m²     General appearance:  Appears comfortable. Well nourished  Eyes: Sclera clear, pupils equal  ENT: Moist mucus membranes, no thrush. Trachea midline. Cardiovascular: Regular rhythm, normal S1, S2. No murmur, gallop, rub. No edema in lower extremities  Respiratory: Clear to auscultation bilaterally, no wheeze, good inspiratory effort  Gastrointestinal: Abdomen soft, non-tender, not distended, normal bowel sounds  Musculoskeletal: No cyanosis in digits, neck supple  Neurology: Cranial nerves grossly intact. Alert and oriented in time, place and person. No speech or motor deficits  Psychiatry: Appropriate affect. Not agitated  Skin: Warm, dry, normal turgor, no rash  Brisk capillary refill, peripheral pulses palpable   Labs:  CBC:   Lab Results   Component Value Date    WBC 12.1 01/17/2021    RBC 5.10 01/17/2021    HGB 15.5 01/17/2021    HCT 46.8 01/17/2021    MCV 91.8 01/17/2021    MCH 30.4 01/17/2021    MCHC 33.2 01/17/2021    RDW 14.5 01/17/2021     01/17/2021    MPV 7.4 01/17/2021     BMP:    Lab Results   Component Value Date     01/17/2021    K 4.3 01/17/2021    K 5.7 01/17/2021    CL 93 01/17/2021    CO2 23 01/17/2021    BUN 21 01/17/2021    CREATININE 1.0 01/17/2021    CALCIUM 10.5 01/17/2021    GFRAA >60 01/17/2021    LABGLOM 56 01/17/2021    GLUCOSE 93 01/17/2021     XR CHEST PORTABLE   Final Result   No acute cardiopulmonary disease. CTA HEAD NECK W CONTRAST   Final Result   No acute abnormality or flow-limiting stenosis of the major arteries of the   head and neck. Beading in the bilateral mid internal carotid arteries and distal V2 segment   of the left vertebral artery, likely related to mild fibromuscular dysplasia. CT HEAD WO CONTRAST   Final Result   No acute intracranial abnormality. Stable encephalomalacia in the left parietal lobe, consistent with remote   infarction. Critical results were called by Dr. Roberta Aldridge MD to Viry Rivera on   1/17/2021 at 13:29. MRI brain with and without contrast    (Results Pending)     Chest Xray:   EKG:    Ventricular Rate 75 P BPM QTc Calculation (Bazett) 460 P ms   Atrial Rate 75 P BPM P Axis 54 P degrees   P-R Interval 130 P ms R Axis 17 P degrees   QRS Duration 74 P ms T Axis 32 P degrees   Q-T Interval 412 P ms Diagnosis Normal sinus rhythmNonspecific ST abnormalityAbnormal       I visualized CXR images and EKG strips and agree with documented interpretation    Discussed case  with ED provider    Problem List  Principal Problem:    Vertigo  Active Problems:    History of CVA in adulthood    Hyperlipidemia    Patent foramen ovale    Headache, chronic daily    Hypothyroidism    Fibromyalgia  Resolved Problems:    * No resolved hospital problems. *        Assessment/Plan:     Vertigo with visual disturbance and history of remote CVA  Patient admitted under CVA pathway  Continuous telemetry and pulse oximetry monitoring  Serial neuro checks every 4 hours  Continue home Plavix and statin therapy  MRI scheduled for a.m.   Echo scheduled to further assess PFO  Neuro consultation placed    HTN  Continue Toprol 50 mg every morning; additional 25 mg p.m. dosage recently added by PCP on hold due to relative low BP  Question if recent antihypertensive dose change may have resulted in transient diminished blood flow to watershed areas of cerebral cortex thus causing TIA symptoms    Chronic daily headaches/FMS  Avoid Fioricet during admission as patient reports PCP has discontinued this medication Cymbalta dosage decreased to 60 mg once daily due to diminished GFR  Continue Klonopin, Lamictal, and Zyprexa per home doses  As needed Tylenol every 4 hours    Hypothyroidism  TSH/free T4 pending  Continue levothyroxine supplementation    GI prophylaxiscontinue PPI Protonix daily        DVT prophylaxisLovenox 40 mg subcu daily  Code statusfull code  Dietcardiac JOCELYN  IV accessPIV established in ED      Admit as observation. I anticipate hospitalization spanning less than two midnights for investigation and treatment of the above medically necessary diagnoses. Please note that some part of this chart was generated using Dragon dictation software. Although every effort was made to ensure the accuracy of this automated transcription, some errors in transcription may have occurred inadvertently. If you may need any clarification, please do not hesitate to contact me through [facility].        Manoj Allen MD    1/17/2021 8:51 PM

## 2021-01-17 NOTE — ED PROVIDER NOTES
Skin: Negative for rash and wound. Neurological: Positive for dizziness, numbness and headaches. Negative for weakness and light-headedness. Except as noted above the remainder of the review of systems was reviewedand negative.        PAST MEDICAL HISTORY     Past Medical History:   Diagnosis Date    Anticoagulant long-term use     Anxiety     Arthritis     Cerebrovascular disease 2/2014    stroke    Depression     Endometriosis     Fibromyalgia     Generalized osteoarthritis 3/6/2015    GERD (gastroesophageal reflux disease)     H/O suicide attempt     Headache(784.0)     Hyperlipidemia     Hypertension     Hypothyroidism     Irritable bowel syndrome     Migraines     Obesity     Patent foramen ovale 2/27/2014    PONV (postoperative nausea and vomiting)          SURGICAL HISTORY       Past Surgical History:   Procedure Laterality Date    CHOLECYSTECTOMY  1990's    laparoscopic    COLONOSCOPY  2014    COLONOSCOPY  06/29/2017    random biopsies    COLONOSCOPY  01/23/2017    polyp    HYSTERECTOMY  1980's    total vaginal hysterectomy, retains both ovaries, menorrhagia, fibroid tumors    JOINT REPLACEMENT Left 06/28/2018    LTK     KNEE CARTILAGE SURGERY Right     KNEE SURGERY      THYROIDECTOMY           CURRENT MEDICATIONS       Current Discharge Medication List      CONTINUE these medications which have NOT CHANGED    Details   levothyroxine (SYNTHROID) 125 MCG tablet TAKE 1 TABLET BY MOUTH EVERY DAY  Qty: 90 tablet, Refills: 0    Associated Diagnoses: Acquired hypothyroidism      atenolol (TENORMIN) 25 MG tablet Take 50 mg by mouth daily 50mgs in morning and 25mgs in evening      OLANZapine zydis (ZYPREXA) 5 MG disintegrating tablet Take 5 mg by mouth nightly as needed      pantoprazole sodium (PROTONIX) 40 MG PACK packet Take 40 mg by mouth every morning (before breakfast)      rosuvastatin (CRESTOR) 40 MG tablet Take 40 mg by mouth every evening !! Ubrogepant 50 MG TABS Take 50 mg by mouth      ! ! Ubrogepant 100 MG TABS Take 100 mg by mouth as needed (up to one dose)      clindamycin (CLEOCIN) 150 MG capsule TAKE 4 CAPSULES BY MOUTH 1 HOUR BEFORE DENTAL PROCEDURE  Qty: 8 capsule, Refills: 0      metoprolol succinate (TOPROL XL) 50 MG extended release tablet Take 50 mg by mouth daily      ondansetron (ZOFRAN) 4 MG tablet Take 1 tablet by mouth daily as needed for Nausea or Vomiting  Qty: 30 tablet, Refills: 0      clopidogrel (PLAVIX) 75 MG tablet Take 75 mg by mouth daily      orphenadrine (NORFLEX) 100 MG extended release tablet Take 100 mg by mouth 2 times daily as needed       promethazine (PHENERGAN) 25 MG tablet Take 25 mg by mouth every 6 hours as needed for Nausea      diclofenac sodium (VOLTAREN) 1 % GEL Apply 4 g topically 3 times daily as needed for Pain Apply to LEFT knee  Qty: 1 Tube, Refills: 1    Associated Diagnoses: Primary osteoarthritis of left knee      lamoTRIgine (LAMICTAL) 150 MG tablet Take 150 mg by mouth daily      DULoxetine (CYMBALTA) 60 MG capsule Take 2 capsules by mouth daily  Qty: 120 capsule, Refills: 3      Cholecalciferol (VITAMIN D) 2000 UNITS CAPS capsule Take 1 capsule by mouth daily      clonazePAM (KLONOPIN) 0.5 MG tablet Take 1 tablet by mouth 2 times daily as needed  Qty: 60 tablet, Refills: 0      omeprazole (PRILOSEC) 40 MG capsule TAKE 1 CAPSULE BY MOUTH DAILY  Qty: 90 capsule, Refills: 3       !! - Potential duplicate medications found. Please discuss with provider.           ALLERGIES     Latex, Codeine, Ketorolac, Ketorolac tromethamine, Kiwi extract, Nsaids, Penicillins, and Tolmetin    FAMILY HISTORY       Family History   Problem Relation Age of Onset    Clotting Disorder Mother     Other Mother         lung disease    Cancer Father         colon, brain    Arthritis Father         polio    Heart Disease Father     Heart Disease Brother     Obesity Brother     Substance Abuse Brother SOCIAL HISTORY       Social History     Socioeconomic History    Marital status:      Spouse name: None    Number of children: None    Years of education: None    Highest education level: None   Occupational History    Occupation: retired   Social Needs    Financial resource strain: None    Food insecurity     Worry: None     Inability: None    Transportation needs     Medical: None     Non-medical: None   Tobacco Use    Smoking status: Never Smoker    Smokeless tobacco: Never Used   Substance and Sexual Activity    Alcohol use: Yes     Alcohol/week: 2.0 standard drinks     Types: 2 Glasses of wine per week     Comment: socially    Drug use: No    Sexual activity: Not Currently     Birth control/protection: Post-menopausal   Lifestyle    Physical activity     Days per week: None     Minutes per session: None    Stress: None   Relationships    Social connections     Talks on phone: None     Gets together: None     Attends Jew service: None     Active member of club or organization: None     Attends meetings of clubs or organizations: None     Relationship status: None    Intimate partner violence     Fear of current or ex partner: None     Emotionally abused: None     Physically abused: None     Forced sexual activity: None   Other Topics Concern    None   Social History Narrative    None       SCREENINGS   NIH Stroke Scale  NIH Stroke Scale Assessed: Yes  Interval: Baseline  Level of Consciousness (1a. ): Alert  LOC Questions (1b. ):  Answers both correctly  LOC Commands (1c. ): Performs both tasks correctly  Best Gaze (2. ): Normal  Visual (3. ): No visual loss  Facial Palsy (4. ): Normal symmetrical movement  Motor Arm, Left (5a. ): No drift  Motor Arm, Right (5b. ): No drift  Motor Leg, Left (6a. ): No drift  Motor Leg, Right (6b. ): No drift  Limb Ataxia (7. ): Absent  Sensory (8. ): (!) Mild to Moderate  Best Language (9. ): No aphasia  Dysarthria (10. ): Normal Gait: Gait abnormal.      Comments: No facial droop; slurred speech, or protonator drift noted. Finger to nose, rapid alternating and heel to shin intact bilaterally     -Swaying side to side on gait exam, non-ataxic appearing   Psychiatric:         Behavior: Behavior is cooperative. DIAGNOSTIC RESULTS     EKG: All EKG's are interpreted by the Emergency Department Physicianwho either signs or Co-signs this chart in the absence of a cardiologist.    The Ekg interpreted by me shows  normal sinus rhythm with a rate of 75  Axis is   Normal  QTc is  460  Intervals and Durations are unremarkable. ST Segments: no acute change  No significant change from prior EKG dated 6/19/2018      RADIOLOGY:   Non-plain film images such as CT, Ultrasound and MRI are read by the radiologist. Plain radiographic images are visualized and preliminarily interpreted by the emergency physician with the below findings:      Interpretation per the Radiologist below, if available at the time of this note:    MRI brain with and without contrast   Final Result   1. No acute intracranial abnormality. No acute infarct. 2. Small chronic infarct involving the left parietal lobe. 3. Minimal global parenchymal volume loss with mild chronic microvascular   ischemic changes. XR CHEST PORTABLE   Final Result   No acute cardiopulmonary disease. CTA HEAD NECK W CONTRAST   Final Result   No acute abnormality or flow-limiting stenosis of the major arteries of the   head and neck. Beading in the bilateral mid internal carotid arteries and distal V2 segment   of the left vertebral artery, likely related to mild fibromuscular dysplasia. CT HEAD WO CONTRAST   Final Result   No acute intracranial abnormality. Stable encephalomalacia in the left parietal lobe, consistent with remote   infarction. Critical results were called by Dr. Berto Gatica MD to [facility] on   1/17/2021 at 13:29. ED BEDSIDE ULTRASOUND:   Performed by ED Physician - none    LABS:  Labs Reviewed   CBC WITH AUTO DIFFERENTIAL - Abnormal; Notable for the following components:       Result Value    WBC 12.1 (*)     Neutrophils Absolute 10.1 (*)     All other components within normal limits    Narrative:     Performed at:  [address], [facility] [employer]   Phone [phone]   COMPREHENSIVE METABOLIC PANEL W/ REFLEX TO MG FOR LOW K - Abnormal; Notable for the following components:    Sodium 130 (*)     Potassium reflex Magnesium 5.7 (*)     Chloride 93 (*)     BUN 21 (*)     GFR Non- 56 (*)     Total Protein 8.7 (*)     Alb 5.4 (*)     AST 43 (*)     All other components within normal limits    Narrative:     Performed at:  [address], [facility] [employer]   Phone [phone]   BRAIN NATRIURETIC PEPTIDE - Abnormal; Notable for the following components:    Pro- (*)     All other components within normal limits    Narrative:     Performed at:  [address], [facility] [employer]   Phone [phone]   LIPID PANEL - Abnormal; Notable for the following components:    Cholesterol, Total 204 (*)     HDL 98 (*)     All other components within normal limits    Narrative:     Performed at:  [place] [address]   Phone [phone]   COMPREHENSIVE METABOLIC PANEL W/ REFLEX TO MG FOR LOW K - Abnormal; Notable for the following components:    Albumin/Globulin Ratio 2.3 (*)     All other components within normal limits    Narrative:     Performed at:  [address], [address] [employer]   Phone [phone]   TROPONIN    Narrative:     Performed at:  [address] [place] [address] [address] [place] [address], [facility] [email or web address]   Phone [phone]   PROTIME-INR    Narrative:     Performed at:  [address], [facility] [email or web address]   Phone [phone]   APTT    Narrative:     Performed at:  [address], [facility] [email or web address]   Phone [phone]   URINE RT REFLEX TO CULTURE    Narrative:     Performed at:  [address], [facility] [email or web address]   Phone [phone]   POTASSIUM    Narrative:     Performed at:  [address], [facility] [email or web address]   Phone [phone]   TROPONIN    Narrative:     Performed at:  [address], [facility] [email or web address]   Phone [phone]   CBC    Narrative:     Performed at:  [address], [facility] [email or web address]   Phone [phone]   HEMOGLOBIN A1C   POCT GLUCOSE    Narrative:     Performed at:  [address], [facility] [email or web address]   Phone [phone]       All other labs were within normal range ornot returned as of this dictation.     EMERGENCY DEPARTMENT COURSE and DIFFERENTIAL DIAGNOSIS/MDM:   Vitals:    Vitals:    01/17/21 1930 01/17/21 2338 01/18/21 0500 01/18/21 0928   BP: 113/73 122/75 126/79 126/72   Pulse: 81 76 71 84   Resp: 18 18 18 18   Temp: 98.6 °F (37 °C) 98.3 °F (36.8 °C) 98.4 °F (36.9 °C) 98.4 °F (36.9 °C)   TempSrc: Oral Oral Oral Oral   SpO2: 95% 94% 95% 95%   Weight:       Height:             MDM    ED COURSE/MDM -Tyler Dave is a 63 y.o. female with a history of CVA who presents to ED for strokelike symptoms. Patient states at 6, 2 hours prior to arrival she was at [employer] when she suddenly felt hot, nauseous, lightheaded with numbness to the right side of her face. She had gone home and when she tried to get up states that she got dizzy and lightheaded. Currently complains of headache. On examination patient with self-reported sensory deficit to right side of the face, no focal deficits noted. Though when she ambulated, she was swaying appearing as though she may fall and therefore was quickly back to her bed. NIH 1  -Stroke was called, spoke with the stroke team and though she is a candidate for TPA, given that NIH is 1, stated she would like to take a look at the scans first.  After the return of scans, which were negative, again discussed TPA. Though if patient is not able to ambulate that would be considered debilitating deficit however would like to evaluate further. It was decided that the stroke team with telestroke with the patient. After she evaluate the patient thoroughly, she called me back and inform me that the gait does not seem ataxic, and would defer TPA at this time. She recommends giving medication for the migraine. However does recommend admission for general stroke work-up.  -Lab work was significant for mild hyponatremia of 130, decreased GFR 56, mild leukocytosis 12.1.  -CT head shows no acute abnormality or flow-limiting stenosis of the major arteries of the head and neck. Beading in the bilateral mid internal carotid arteries and distal V2 segment of the left vertebral artery likely related to mild fibromuscular dysplasia. -CT of the head and neck shows no acute intracranial abnormality. No acute infarct. Small chronic infarct involving the left parietal lobe. Minimal global parenchymal volume loss with mild chronic microvascular ischemic changes. -This x-ray shows no acute cardiopulmonary process  -Patient was given Compazine, Benadryl for headache.  -Discussed patient's presentation and findings with hospitalist who agrees accept patient for admission. REASSESSMENT      Well appearing, non toxic, alert, oriented speaking in full sentences and hemodynamically stable upon discharge      CRITICAL CARE TIME   Total Critical Care time was 35 minutes, excluding separately reportableprocedures. There was a high probability of clinicallysignificant/life threatening deterioration in the patient's condition which required my urgent intervention. CONSULTS:  IP CONSULT TO NEUROLOGY    PROCEDURES:  Unless otherwise noted below, none     Procedures    FINAL IMPRESSION      1. Right facial numbness    2. Nonintractable headache, unspecified chronicity pattern, unspecified headache type    3. Dizziness          DISPOSITION/PLAN   DISPOSITION Admitted 01/17/2021 03:40:24 PM      PATIENT REFERREDTO:  No follow-up provider specified.     DISCHARGE MEDICATIONS:  Current Discharge Medication List             (Please note that portions of this note were completed with a voice recognition program.  Efforts were made to edit the dictations but occasionally wordsare mis-transcribed.)    Shyann Rios MD (electronically signed)  Attending Emergency Physician            Shyann Rios MD  01/18/21 8204

## 2021-01-17 NOTE — ED NOTES
Patient's visitor provided with Emergency Department Visitor Restrictions paper. Instructed to review and informed of the NO VISITOR policy at his time.      Edelmira Ordaz RN  01/17/21 1400

## 2021-01-18 ENCOUNTER — APPOINTMENT (OUTPATIENT)
Dept: MRI IMAGING | Age: 61
End: 2021-01-18
Payer: MEDICARE

## 2021-01-18 VITALS
HEIGHT: 61 IN | BODY MASS INDEX: 32.38 KG/M2 | DIASTOLIC BLOOD PRESSURE: 93 MMHG | TEMPERATURE: 98.5 F | HEART RATE: 84 BPM | WEIGHT: 171.5 LBS | OXYGEN SATURATION: 98 % | SYSTOLIC BLOOD PRESSURE: 128 MMHG | RESPIRATION RATE: 18 BRPM

## 2021-01-18 LAB
A/G RATIO: 2.3 (ref 1.1–2.2)
ALBUMIN SERPL-MCNC: 4.5 G/DL (ref 3.4–5)
ALP BLD-CCNC: 90 U/L (ref 40–129)
ALT SERPL-CCNC: 11 U/L (ref 10–40)
ANION GAP SERPL CALCULATED.3IONS-SCNC: 11 MMOL/L (ref 3–16)
AST SERPL-CCNC: 17 U/L (ref 15–37)
BILIRUB SERPL-MCNC: 0.3 MG/DL (ref 0–1)
BUN BLDV-MCNC: 19 MG/DL (ref 7–20)
CALCIUM SERPL-MCNC: 9.3 MG/DL (ref 8.3–10.6)
CHLORIDE BLD-SCNC: 101 MMOL/L (ref 99–110)
CHOLESTEROL, TOTAL: 204 MG/DL (ref 0–199)
CO2: 26 MMOL/L (ref 21–32)
CREAT SERPL-MCNC: 0.9 MG/DL (ref 0.6–1.2)
EKG ATRIAL RATE: 75 BPM
EKG DIAGNOSIS: NORMAL
EKG P AXIS: 54 DEGREES
EKG P-R INTERVAL: 130 MS
EKG Q-T INTERVAL: 412 MS
EKG QRS DURATION: 74 MS
EKG QTC CALCULATION (BAZETT): 460 MS
EKG R AXIS: 17 DEGREES
EKG T AXIS: 32 DEGREES
EKG VENTRICULAR RATE: 75 BPM
ESTIMATED AVERAGE GLUCOSE: 116.9 MG/DL
GFR AFRICAN AMERICAN: >60
GFR NON-AFRICAN AMERICAN: >60
GLOBULIN: 2 G/DL
GLUCOSE BLD-MCNC: 97 MG/DL (ref 70–99)
HBA1C MFR BLD: 5.7 %
HCT VFR BLD CALC: 40.4 % (ref 36–48)
HDLC SERPL-MCNC: 98 MG/DL (ref 40–60)
HEMOGLOBIN: 13.4 G/DL (ref 12–16)
LDL CHOLESTEROL CALCULATED: 83 MG/DL
LV EF: 58 %
LVEF MODALITY: NORMAL
MCH RBC QN AUTO: 30.5 PG (ref 26–34)
MCHC RBC AUTO-ENTMCNC: 33.2 G/DL (ref 31–36)
MCV RBC AUTO: 91.8 FL (ref 80–100)
PDW BLD-RTO: 14.6 % (ref 12.4–15.4)
PLATELET # BLD: 197 K/UL (ref 135–450)
PMV BLD AUTO: 6.9 FL (ref 5–10.5)
POTASSIUM REFLEX MAGNESIUM: 4 MMOL/L (ref 3.5–5.1)
RBC # BLD: 4.4 M/UL (ref 4–5.2)
SODIUM BLD-SCNC: 138 MMOL/L (ref 136–145)
TOTAL PROTEIN: 6.5 G/DL (ref 6.4–8.2)
TRIGL SERPL-MCNC: 115 MG/DL (ref 0–150)
VLDLC SERPL CALC-MCNC: 23 MG/DL
WBC # BLD: 8.1 K/UL (ref 4–11)

## 2021-01-18 PROCEDURE — 80053 COMPREHEN METABOLIC PANEL: CPT

## 2021-01-18 PROCEDURE — 92610 EVALUATE SWALLOWING FUNCTION: CPT

## 2021-01-18 PROCEDURE — G0378 HOSPITAL OBSERVATION PER HR: HCPCS

## 2021-01-18 PROCEDURE — 83036 HEMOGLOBIN GLYCOSYLATED A1C: CPT

## 2021-01-18 PROCEDURE — 36415 COLL VENOUS BLD VENIPUNCTURE: CPT

## 2021-01-18 PROCEDURE — 85027 COMPLETE CBC AUTOMATED: CPT

## 2021-01-18 PROCEDURE — 6370000000 HC RX 637 (ALT 250 FOR IP): Performed by: HOSPITALIST

## 2021-01-18 PROCEDURE — A9579 GAD-BASE MR CONTRAST NOS,1ML: HCPCS

## 2021-01-18 PROCEDURE — 6360000004 HC RX CONTRAST MEDICATION

## 2021-01-18 PROCEDURE — 6360000002 HC RX W HCPCS: Performed by: HOSPITALIST

## 2021-01-18 PROCEDURE — 80061 LIPID PANEL: CPT

## 2021-01-18 PROCEDURE — 2580000003 HC RX 258: Performed by: HOSPITALIST

## 2021-01-18 PROCEDURE — 99219 PR INITIAL OBSERVATION CARE/DAY 50 MINUTES: CPT | Performed by: PSYCHIATRY & NEUROLOGY

## 2021-01-18 PROCEDURE — 96372 THER/PROPH/DIAG INJ SC/IM: CPT

## 2021-01-18 PROCEDURE — 93010 ELECTROCARDIOGRAM REPORT: CPT | Performed by: INTERNAL MEDICINE

## 2021-01-18 PROCEDURE — 97161 PT EVAL LOW COMPLEX 20 MIN: CPT

## 2021-01-18 PROCEDURE — 97116 GAIT TRAINING THERAPY: CPT

## 2021-01-18 PROCEDURE — 93306 TTE W/DOPPLER COMPLETE: CPT

## 2021-01-18 PROCEDURE — 70553 MRI BRAIN STEM W/O & W/DYE: CPT

## 2021-01-18 RX ADMIN — METOPROLOL SUCCINATE 50 MG: 50 TABLET, EXTENDED RELEASE ORAL at 09:44

## 2021-01-18 RX ADMIN — LEVOTHYROXINE SODIUM 125 MCG: 0.12 TABLET ORAL at 09:45

## 2021-01-18 RX ADMIN — Medication 10 ML: at 09:45

## 2021-01-18 RX ADMIN — GADOTERIDOL 15 ML: 279.3 INJECTION, SOLUTION INTRAVENOUS at 08:28

## 2021-01-18 RX ADMIN — Medication 2000 UNITS: at 09:44

## 2021-01-18 RX ADMIN — ROSUVASTATIN CALCIUM 40 MG: 10 TABLET, FILM COATED ORAL at 09:44

## 2021-01-18 RX ADMIN — DULOXETINE HYDROCHLORIDE 60 MG: 60 CAPSULE, DELAYED RELEASE ORAL at 09:44

## 2021-01-18 RX ADMIN — CLOPIDOGREL BISULFATE 75 MG: 75 TABLET ORAL at 09:45

## 2021-01-18 RX ADMIN — ACETAMINOPHEN 650 MG: 325 TABLET ORAL at 05:16

## 2021-01-18 RX ADMIN — PANTOPRAZOLE SODIUM 40 MG: 40 TABLET, DELAYED RELEASE ORAL at 05:16

## 2021-01-18 RX ADMIN — ENOXAPARIN SODIUM 40 MG: 40 INJECTION SUBCUTANEOUS at 09:45

## 2021-01-18 RX ADMIN — ACETAMINOPHEN 650 MG: 325 TABLET ORAL at 14:29

## 2021-01-18 RX ADMIN — LAMOTRIGINE 150 MG: 25 TABLET ORAL at 09:44

## 2021-01-18 ASSESSMENT — ENCOUNTER SYMPTOMS
ABDOMINAL PAIN: 0
DIARRHEA: 0
NAUSEA: 1
SHORTNESS OF BREATH: 0
COUGH: 0
CHEST TIGHTNESS: 0
VOMITING: 0
WHEEZING: 0
STRIDOR: 0

## 2021-01-18 ASSESSMENT — PAIN SCALES - GENERAL
PAINLEVEL_OUTOF10: 5
PAINLEVEL_OUTOF10: 4

## 2021-01-18 NOTE — PROGRESS NOTES
Occupational Therapy  Consult received. Chart reviewed. Evaluation screened and not completed. PT, pt, and RN reporting pt is independent with no assist needed from staff to complete ADL, functional mobility or functional t/fs. Will sign off.   Donnita Sandhoff, OTR/L #[id number]

## 2021-01-18 NOTE — DISCHARGE SUMMARY
Hospital Medicine Discharge Summary    Patient ID: Tapan Mosher      Patient's PCP: No primary care provider on file. Admit Date: 1/17/2021     Discharge Date:   1/18/2021    Admitting Physician: Simran Bonilla MD     Discharge Physician: Mariel Acevedo MD     Discharge Diagnoses: Active Hospital Problems    Diagnosis    Variants of migraine [G43.809]    HTN (hypertension), benign [I10]    History of CVA in adulthood [Z86.73]    Dizziness [R42]    Hypothyroidism [E03.9]    Hyperlipidemia [E78.5]    Headache, chronic daily [R51.9]    Patent foramen ovale [Q21.1]    Fibromyalgia [M79.7]       The patient was seen and examined on day of discharge and this discharge summary is in conjunction with any daily progress note from day of discharge. Hospital Course:   Tapan Mosher is a 63 y.o. female who presented to the ED with complaints of bitemporal headache, visual disturbance, right facial paresthesia, and subjective vertigo ongoing for several hours prior to arrival.  The patient has a remote history of cerebral infarct and was concerned that the symptoms may be due to acute CVA. Stat head CT as well as CTA of head/neck were performed revealing no acute intracranial abnormality. Stable encephalomalacia in the left parietal region consistent with remote infarct was once again noted. Labs were obtained and notable for hyponatremia and acute hyperkalemia with diminished GFR. The patient reports that her PCP has recently changed her antihypertensive regimen due to accelerated blood pressure readings. She also reports that she has chronic daily headaches which she treats with Flexeril and Ubrelvy. Patient has underlying diagnosis of fibromyalgia for which she is prescribed Klonopin, Lamictal, Zyprexa, and high dose Cymbalta. Patient was placed to the Hospitalist service for further medical care and evaluation.      Physical Exam Performed: BP (!) 128/93   Pulse 84   Temp 98.5 °F (36.9 °C) (Oral)   Resp 18   Ht 5' 1\" (1.549 m)   Wt 171 lb 8 oz (77.8 kg)   SpO2 98%   BMI 32.40 kg/m²   General appearance:  Appears comfortable. Well nourished. States intermittent HA and pressure to her frontal head and radiating behind her eyes. Eyes: Sclera clear, pupils equal  ENT: Moist mucus membranes, no thrush. Trachea midline. Cardiovascular: Regular rhythm, normal S1, S2. No murmur, gallop, rub. No edema in lower extremities  Respiratory: Clear to auscultation bilaterally, no wheeze, good inspiratory effort  Gastrointestinal: Abdomen soft, non-tender, not distended, normal bowel sounds  Musculoskeletal: No cyanosis in digits, neck supple  Neurology: Cranial nerves grossly intact. Alert and oriented in time, place and person. No speech or motor deficits  Psychiatry: Appropriate affect. Not agitated  Skin: Warm, dry, normal turgor, no rash  Brisk capillary refill, peripheral pulses palpable     Labs: For convenience and continuity at follow-up the following most recent labs are provided:      CBC:    Lab Results   Component Value Date    WBC 8.1 01/18/2021    HGB 13.4 01/18/2021    HCT 40.4 01/18/2021     01/18/2021       Renal:    Lab Results   Component Value Date     01/18/2021    K 4.0 01/18/2021     01/18/2021    CO2 26 01/18/2021    BUN 19 01/18/2021    CREATININE 0.9 01/18/2021    CALCIUM 9.3 01/18/2021         Significant Diagnostic Studies    Radiology:   MRI brain with and without contrast   Final Result   1. No acute intracranial abnormality. No acute infarct. 2. Small chronic infarct involving the left parietal lobe. 3. Minimal global parenchymal volume loss with mild chronic microvascular   ischemic changes. XR CHEST PORTABLE   Final Result   No acute cardiopulmonary disease.          CTA HEAD NECK W CONTRAST   Final Result   No acute abnormality or flow-limiting stenosis of the major arteries of the head and neck. Beading in the bilateral mid internal carotid arteries and distal V2 segment   of the left vertebral artery, likely related to mild fibromuscular dysplasia. CT HEAD WO CONTRAST   Final Result   No acute intracranial abnormality. Stable encephalomalacia in the left parietal lobe, consistent with remote   infarction. Critical results were called by Dr. Vishal Cm MD to [facility] on   1/17/2021 at 13:29. Problem List  Principal Problem:    Vertigo  Active Problems:    History of CVA in adulthood    Hyperlipidemia    Patent foramen ovale    Headache, chronic daily    Hypothyroidism    Fibromyalgia  Resolved Problems:    * No resolved hospital problems. *         Assessment/Plan:      Vertigo with visual disturbance complicated by remote CVA, likely peripheral vertigo vs BPV vs Vertiginous migraine. Continuous telemetry and pulse oximetry monitoring  Serial neuro checks every 4 hours  Continue home Plavix and statin therapy  MRI, no acute intracranial pathology. Chronic infarct involving left parietal lobe. No acute infarct noted. Echo: 1/18/2021 EF 55%, no regional wall motion abnormalities, no cardioembolic focus identified. Noted minimal extracardiac shunting. Per discussion with Dr. Lauren Kerns, no further cardiac work-up is needed. This most likely does not represent a cardioembolic focus  Neuro consultation: Continue Plavix and statin for secondary stroke prevention. Follow-up with  headache clinic.     Essential HTN with no known CAD.   Continue Toprol 50 mg every morning; additional 25 mg p.m. dosage recently added by PCP on hold due to relative low BP     Chronic daily headaches/FMS  Cymbalta dosage decreased to 60 mg once daily due to diminished GFR  Continue Klonopin, Lamictal, and Zyprexa prn per home doses  As needed Tylenol every 4 hours     Hypothyroidism  Continue levothyroxine supplementation          Consults: IP CONSULT TO NEUROLOGY    Disposition:  Home    Condition at Discharge: Stable    Discharge Instructions/Follow-up:  Neurology  Headache clinic    Code Status:  Full Code     Activity: activity as tolerated    Diet: regular diet      Discharge Medications:     Current Discharge Medication List           Details   levothyroxine (SYNTHROID) 125 MCG tablet TAKE 1 TABLET BY MOUTH EVERY DAY  Qty: 90 tablet, Refills: 0    Associated Diagnoses: Acquired hypothyroidism      atenolol (TENORMIN) 25 MG tablet Take 50 mg by mouth daily 50mgs in morning and 25mgs in evening      pantoprazole sodium (PROTONIX) 40 MG PACK packet Take 40 mg by mouth every morning (before breakfast)      rosuvastatin (CRESTOR) 40 MG tablet Take 40 mg by mouth every evening      ! ! Ubrogepant 50 MG TABS Take 50 mg by mouth      ! ! Ubrogepant 100 MG TABS Take 100 mg by mouth as needed (up to one dose)      ondansetron (ZOFRAN) 4 MG tablet Take 1 tablet by mouth daily as needed for Nausea or Vomiting  Qty: 30 tablet, Refills: 0      clopidogrel (PLAVIX) 75 MG tablet Take 75 mg by mouth daily      orphenadrine (NORFLEX) 100 MG extended release tablet Take 100 mg by mouth 2 times daily as needed       promethazine (PHENERGAN) 25 MG tablet Take 25 mg by mouth every 6 hours as needed for Nausea      diclofenac sodium (VOLTAREN) 1 % GEL Apply 4 g topically 3 times daily as needed for Pain Apply to LEFT knee  Qty: 1 Tube, Refills: 1    Associated Diagnoses: Primary osteoarthritis of left knee      lamoTRIgine (LAMICTAL) 150 MG tablet Take 150 mg by mouth daily      DULoxetine (CYMBALTA) 60 MG capsule Take 2 capsules by mouth daily  Qty: 120 capsule, Refills: 3      Cholecalciferol (VITAMIN D) 2000 UNITS CAPS capsule Take 1 capsule by mouth daily      clonazePAM (KLONOPIN) 0.5 MG tablet Take 1 tablet by mouth 2 times daily as needed  Qty: 60 tablet, Refills: 0       !! - Potential duplicate medications found. Please discuss with provider. Time Spent on discharge is more than 45 minutes in the examination, evaluation, counseling and review of medications and discharge plan. Signed:    Deb Silva MD   1/18/2021      Thank you No primary care provider on file. for the opportunity to be involved in this patient's care. If you have any questions or concerns please feel free to contact me at [phone].

## 2021-01-18 NOTE — PROGRESS NOTES
Verified with pharmacy. Pt does take atenolol twice daily. Dose is 25mgs BID. Does not take metoprolol.

## 2021-01-18 NOTE — CONSULTS
TempSrc: Oral Oral Oral Oral   SpO2: 95% 94% 95% 95%   Weight:       Height:           General appearance:  Normal development and appear in no acute distress. Eye: No icterus. Fundus: Funduscopic examination cannot be performed due to COVID19 restrictions and precautions. Neck: supple  Cardiovascular: No lower leg edema with good pulsation. Mental Status:   Oriented to person, place, problem, and time. Memory: Aware of recent and remote event. Good immediate recall. Intact remote memory  Normal attention span and concentration. Language: intact naming, repeating and fluency   Good fund of Knowledge. Aware of current events and vocabulary   Cranial Nerves:   II: Visual fields: Full. Pupils: equal, round, reactive to light  III,IV,VI: Extra Ocular Movements are intact. No nystagmus  V: Facial sensation is intact   VII: Facial strength and movements: intact and symmetric  VIII: Hearing: Intact  IX: Palate elevation is symmetric  XI: Shoulder shrug is intact  XII: Tongue movements are normal  Musculoskeletal: 5/5 in all 4 extremities. Tone: Normal tone. Reflexes: 2+ in the upper extremity and 2+ in the lower extremity   Planters: flexor bilaterally. Coordination: no pronator drift, no dysmetria with FNF in upper extremities. Normal REM. Sensation: normal to all modalities in both arms and legs.   Gait/Posture: Not tested due to dizziness    Data:  LABS:   Lab Results   Component Value Date     01/18/2021    K 4.0 01/18/2021     01/18/2021    CO2 26 01/18/2021    BUN 19 01/18/2021    CREATININE 0.9 01/18/2021    GFRAA >60 01/18/2021    LABGLOM >60 01/18/2021    GLUCOSE 97 01/18/2021    MG 2.10 04/12/2019    CALCIUM 9.3 01/18/2021     Lab Results   Component Value Date    WBC 8.1 01/18/2021    RBC 4.40 01/18/2021    HGB 13.4 01/18/2021    HCT 40.4 01/18/2021    MCV 91.8 01/18/2021    RDW 14.6 01/18/2021     01/18/2021     Lab Results   Component Value Date    INR 0.93 01/17/2021 PROTIME 10.8 01/17/2021       Neuroimaging were independently reviewed by me and discussed results with the patient  Reviewed notes from different physicians  Reviewed lab and blood testing    Impression:  Acute dizziness and vertigo. Likely peripheral vertigo versus benign paroxysmal vertigo. Other possibility could include vertiginous migraine as she does have history of chronic intractable migraine. Recent MRI of the brain showed no acute stroke. Chronic intractable migraine with aura  Hypertension  Hypothyroid  Hyperlipidemia    Recommendation:  MRI results discussed with the patient  PT and OT for vestibular training  Telemetry  Plavix for stroke prevention  Continue statin  Continue current blood pressure medications  Blood pressure monitor closely at home  Continue Synthroid  Continue Lamictal for her chronic mood disorder and could help with migraine prevention  Continue SSRI  Can be discharged from neurology when medically stable  Follow-up with her  headache clinic  No further recommendation from neurology  We will follow as needed. Thank you for referring such patient. If you have any questions regarding my consult note, please don't hesitate to call me. Elly Lancaster MD  [phone]    This dictation was generated by voice recognition computer software.  Although all attempts are made to edit the dictation for accuracy, there may be errors in the  transcription that are not intended

## 2021-01-18 NOTE — PROGRESS NOTES
Speech Language Pathology  Facility/Department: [facility] - MED SURG/ORTHO   CLINICAL BEDSIDE SWALLOW EVALUATION      Recommended Diet and Intervention   Solid Consistency Recommendation: Regular   Liquid Consistency Recommendation: Thin  Recommended Form of Meds: PO      NAME: Tapan Mosher  : 1960  MRN: [id number]    ADMISSION DATE: 2021  ADMITTING DIAGNOSIS: has Headache, chronic daily; Hyperlipidemia; Cerebral artery occlusion with cerebral infarction [facility]); GERD (gastroesophageal reflux disease); Hypothyroidism; Anxiety; Depression; H/O suicide attempt; Cerebral infarction ([facility] [address] Disc disorder of cervical region; Fibromyalgia; Generalized osteoarthritis; Irritable bowel syndrome; Impingement syndrome of shoulder region; Patent foramen ovale; Spondyloarthropathy; Menopause; History of total vaginal hysterectomy; Vaginal atrophy; S/P right knee arthroscopy, in  by Dr. Zackery Renner; Primary osteoarthritis of left knee; Chondromalacia patellae of left knee; Decreased libido; Chondromalacia patellae of right knee; Primary osteoarthritis of right knee; Tear of medial meniscus of right knee; Knee effusion; Right knee pain; Left knee pain; Chronic pain of right knee; Chronic pain of left knee; Dyspareunia in female; Primary osteoarthritis of both knees; Status post total left knee replacement; S/P TKR (total knee replacement) using cement, left; Obesity (BMI 30-39.9); History of CVA in adulthood; and Vertigo on their problem list.  ONSET DATE: Pt currently under observation status at [facility]    Recent Chest Xray (21): Impression   No acute cardiopulmonary disease.        Date of Eval: 2021  Evaluating Therapist: Luke Barkley    Current Diet level:  Current Diet : Regular  Current Liquid Diet : Thin      Primary Complaint Patient Complaint: Per MD H&P, Aydee Tsai Keo Chamberlain is a 63 y.o. female who presented to the ED with complaints of bitemporal headache, visual disturbance, right facial paresthesia, and subjective vertigo ongoing for several hours prior to arrival.  The patient has a remote history of cerebral infarct and was concerned that the symptoms may be due to acute CVA. Stat head CT as well as CTA of head/neck were performed revealing no acute intracranial abnormality. Stable encephalomalacia in the left parietal region consistent with remote infarct was once again noted. Labs were obtained and notable for hyponatremia and acute hyperkalemia with diminished GFR. The patient reports that her PCP has recently changed her antihypertensive regimen due to accelerated blood pressure readings. She also reports that she has chronic daily headaches which she treats with Flexeril and Ubrelvy. Patient has underlying diagnosis of fibromyalgia for which she is prescribed Klonopin, Lamictal, Zyprexa, and high dose Cymbalta.     History obtained from patient and review of [facility] chart  Old medical records show patient has a history of patent foramen ovale. It is unclear if this medical diagnosis was responsible for her remote CVA\". Pain:  Pain Assessment  Pain Assessment: 0-10  Pain Level: 4  Patient's Stated Pain Goal: No pain  Pain Type: Acute pain  Pain Location: Head  Pain Descriptors: Headache  Pain Frequency: Continuous  Response to Pain Intervention: Patient Satisfied  RASS Score: Alert and calm    Reason for Referral  Joaquina Lakhani was referred for a bedside swallow evaluation to assess the efficiency of her swallow function, identify signs and symptoms of aspiration and make recommendations regarding safe dietary consistencies, effective compensatory strategies, and safe eating environment.     Impression  Dysphagia Diagnosis: Swallow function appears grossly intact Dysphagia Outcome Severity Scale: Level 7: Normal in all situations   Pt seen upright in bed, alert and agreeable to evaluation, RN OK'd SLP entry and evaluation. Pt reported all symptoms have resolved, back to her baseline. Pt observed with thin liquid trials via consecutive cup and straw sip and regular solid trials during BSE. Grossly timely swallow initiation throughout, no overt s/s of aspiration/penetration -- no coughing, throat clearing, wet vocal quality, change in RR (16/min) or increased WOB observed. Pt denied globus sensation post-swallow. Rotary chew pattern noted with regular solids, no oral / lingual residue post-swallow. Swallow function appears grossly WFL. Recommend continuing pt's current Regular solid diet with thin liquids, meds whole with TL.  RN aware of recs. Further ST not indicated at this time, however, pt may benefit from outpatient ST in the future to target high-level cognitive skills d/t pt's report of \"difficulty processing at times and trouble with numbers\". Pt verbalized understanding of recommendation. Treatment Plan  Requires SLP Intervention: No  Duration/Frequency of Treatment: Further ST not indicated at this time  D/C Recommendations: Home independently; Outpatient(For high-level cognitive tasks; pt reports \"difficulty proccessing and trouble with numbers\" since remote CVA)       Recommended Diet and Intervention   Solid Consistency Recommendation: Regular   Liquid Consistency Recommendation: Thin  Recommended Form of Meds: PO    Therapeutic Interventions: Patient/Family education    Compensatory Swallowing Strategies  Compensatory Swallowing Strategies: Alternate solids and liquids;Upright as possible for all oral intake;Remain upright for 30-45 minutes after meals;Eat/Feed slowly; Small bites/sips Treatment/Goals: Further ST not indicated at this time; pt may benefit from outpatient ST in the future to target high-level cognitive skills d/t pt's report of \"difficulty processing at times and trouble with numbers\". Pt verbalized understanding of recommendation. General  Chart Reviewed: Yes  Comments: Pt admitted d/t vertigo, HA, and R-facial paresthesia. Pt has PMHx of GERD, anxiety and CVA. Behavior/Cognition: Alert; Cooperative;Pleasant mood  Respiratory Status: Room air  O2 Device: None (Room air)  Communication Observation: Functional  Follows Directions: Simple  Dentition: Adequate  Baseline Vocal Quality: Normal  Prior Dysphagia History: No hx of dysphagia per chart review. Consistencies Administered: Reg solid; Thin - straw; Thin - cup    Vision/Hearing  Vision  Vision: Impaired  Vision Exceptions: Wears glasses at all times  Hearing  Hearing: Within functional limits    Oral Motor Deficits  Oral/Motor  Oral Motor: Within functional limits    Oral Phase Dysfunction  Oral phase of swallow grossly appears WNL. No oral phase deficits noted during evaluation. Indicators of Pharyngeal Phase Dysfunction  Pharyngeal phase of swallow appears grossly WNL. No pharyngeal deficits noted during evaluation. Laryngeal elevation appears WFL based upon palpation of anterior neck during swallow. Vocal quality dry before and after po trials. No change in RR or increased WOB before and after po trials. Prognosis  Prognosis  Prognosis for safe diet advancement: excellent  Individuals consulted  Consulted and agree with results and recommendations: Patient;RN    Education  Patient Education: Pt educated on reason for referral, role of ST, assessment results and recommendations. Patient Education Response: Verbalizes understanding  Safety Devices in place: Yes  Type of devices: Nurse notified;Call light within reach; Left in bed       Therapy Time  SLP Individual Minutes  Time In: 1020  Time Out: 5505

## 2021-01-18 NOTE — ACP (ADVANCE CARE PLANNING)
Advance Care Planning     General Advance Care Planning (ACP) Conversation    Date of Conversation: 1/17/2021  Conducted with: Patient with Decision Making Capacity    Healthcare Decision Maker:      Primary Decision Maker: Sylvia Buck - [phone]    Secondary Decision Maker: Zohra Fang - [phone]    Click here to complete [place] including selection of the Healthcare Decision Maker Relationship (ie \"Primary\")  Today we documented Decision Maker(s) consistent with Legal Next of Kin hierarchy. Content/Action Overview:  Has NO ACP documents/care preferences - requested patient complete ACP documents  Reviewed DNR/DNI and patient elects Full Code (Attempt Resuscitation)  ventilation preferences  Patient is agreeable to ventilation should it be needed temporatily. However, if ventilation would bee needed long term, patient was not in agreement and does not want to be on a long term vent.     Length of Voluntary ACP Conversation in minutes:  <16 minutes (Non-Billable)    Robyn Macdonald

## 2021-01-18 NOTE — PLAN OF CARE
Problem: Nutrition  Intervention: Swallowing evaluation  Note: Bedside swallow evaluation completed this date. Nikki Black M.S. [address] [facility]  Speech-language pathologist  [id number]      Intervention: Aspiration precautions  Note: Bedside swallow evaluation completed this date.     Nikki Black M.S. [address] [facility]  Speech-language pathologist  [phone]

## 2021-02-10 NOTE — PROGRESS NOTES
Depo today left deltoid, well tolerated. Next depo due 5/4/21   Physical Therapy    Facility/Department: [place] [address] - MED SURG/ORTHO  Initial Assessment, treatment, DC summary    NAME: Carlos Enrique Crystal  : 1960  MRN: [id number]    Date of Service: 2021    Discharge Recommendations:  Home with assist PRN   PT Equipment Recommendations  Equipment Needed: No    Assessment   Assessment: Pt referred for PT evaluation during current hospital stay with a diagnosis of vertigo. Pt reports all symptoms have resolved at this time. Pt is functioning at baseline and is independent for all mobility. Pt does not need further acute PT, and is safe to return home with PRN assist at . [facility] setting  Prognosis: Excellent  Decision Making: Low Complexity  PT Education: Disease Specific Education;PT Role  Patient Education: pt verbalized understanding of sxs of stroke and what to do if experiencing again. Barriers to Learning: no  No Skilled PT: Independent with functional mobility   REQUIRES PT FOLLOW UP: No  Activity Tolerance  Activity Tolerance: Patient Tolerated treatment well       Patient Diagnosis(es): There were no encounter diagnoses. has a past medical history of Anticoagulant long-term use, Anxiety, Arthritis, Cerebrovascular disease, Depression, Endometriosis, Fibromyalgia, Generalized osteoarthritis, GERD (gastroesophageal reflux disease), H/O suicide attempt, Headache(784.0), Hyperlipidemia, Hypertension, Hypothyroidism, Irritable bowel syndrome, Migraines, Obesity, Patent foramen ovale, and PONV (postoperative nausea and vomiting). has a past surgical history that includes Cholecystectomy ('s); Hysterectomy ('s); Thyroidectomy; Knee cartilage surgery (Right); Colonoscopy (); Colonoscopy (2017); Colonoscopy (2017); joint replacement (Left, 2018); and knee surgery.     Restrictions  Restrictions/Precautions  Restrictions/Precautions: Up as Tolerated  Vision/Hearing  Vision: Impaired  Vision Exceptions: Wears glasses at all times Hearing: Within functional limits     Subjective  General  Chart Reviewed: Yes  Response To Previous Treatment: Not applicable  Family / Caregiver Present: No  Referring Practitioner: Carlito Collins MD  Referral Date : 01/17/21  Diagnosis: vertigo  Follows Commands: Within Functional Limits  Other (Comment): Pt sitting up on coouch upon entry, RN cleared pt for therapy  General Comment  Comments: Pt agreeable to PT.  Pt sitting up on coouch upon entry, RN cleared pt for therapy  Pain Screening  Patient Currently in Pain: Denies  Vital Signs  Patient Currently in Pain: Denies  Pre Treatment Pain Screening  Intervention List: Patient able to continue with treatment    Orientation  Orientation  Overall Orientation Status: Within Normal Limits  Social/Functional History  Social/Functional History  Lives With: Spouse  Type of Home: House  Home Layout: One level, Able to Live on Main level with bedroom/bathroom(plus basement)  Home Access: Stairs to enter without rails  Entrance Stairs - Number of Steps: 2  Bathroom Shower/Tub: Walk-in shower, Shower chair without back  Bathroom Toilet: Standard  ADL Assistance: [address]: Independent  Homemaking Responsibilities: Yes  Meal Prep Responsibility: (shares with )  Laundry Responsibility: Primary  Cleaning Responsibility: (shares with )  Shopping Responsibility: (shares with )  Ambulation Assistance: Independent  Transfer Assistance: Independent  Active : Yes  Occupation: Unemployed  [address]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         Objective          AROM RLE (degrees)  RLE AROM: WNL  AROM LLE (degrees)  LLE AROM : WNL  Strength RLE  Strength RLE: WNL  Comment: grossly 5/5 throughout except R ankle DF 4/5  Strength LLE  Strength LLE: WNL  Comment: grossly 5/5 throughout        Bed mobility  Supine to Sit: Independent  Sit to Supine: Independent  Scooting: Independent  Transfers  Sit to Stand: Independent Stand to sit:  Independent  Bed to Chair: Independent  Ambulation  Ambulation?: Yes  Ambulation 1  Surface: level tile  Device: No Device  Assistance: Independent  Gait Deviations: None  Distance: 200 ft  Stairs/Curb  Stairs?: Yes  Stairs  # Steps : 3  Stairs Height: 6\"  Rails: Bilateral(did not use)  Device: No Device  Assistance: Independent     Balance  Posture: Good  Sitting - Static: Good  Sitting - Dynamic: Good  Standing - Static: Good  Standing - Dynamic: Good        Plan   Plan  Times per week: one time only  Safety Devices  Type of devices: Call light within reach, Nurse notified, Gait belt(left sitting on couch)                                                             AM-PAC Score  AM-PAC Inpatient Mobility Raw Score : 24 (01/18/21 1046)  AM-PAC Inpatient T-Scale Score : 61.14 (01/18/21 1046)  Mobility Inpatient CMS 0-100% Score: 0 (01/18/21 1046)  Mobility Inpatient CMS G-Code Modifier : [facility] (01/18/21 1046)          Goals  Short term goals  Time Frame for Short term goals: 1/18/21  Short term goal 1: Pt will perform all transfers independently; goal met  Short term goal 2: Pt will ambulate 200 ft and negotiate 2 stairs independently; goal met  Patient Goals   Patient goals : \"to go home as soon as possible\"       Therapy Time   Individual Concurrent Group Co-treatment   Time In 0857         Time Out 0918         Minutes 21         Timed Code Treatment Minutes: 11 Minutes(10 min eval)      Ria Cárdenas, PT

## 2021-03-08 ENCOUNTER — IMMUNIZATION (OUTPATIENT)
Dept: PRIMARY CARE CLINIC | Age: 61
End: 2021-03-08
Payer: MEDICARE

## 2021-03-08 PROCEDURE — 0011A PR IMM ADMN SARSCOV2 100 MCG/0.5 ML 1ST DOSE: CPT

## 2021-03-09 PROCEDURE — 91301 COVID-19, MODERNA VACCINE 100MCG/0.5ML DOSE: CPT

## 2021-04-05 ENCOUNTER — IMMUNIZATION (OUTPATIENT)
Dept: PRIMARY CARE CLINIC | Age: 61
End: 2021-04-05
Payer: MEDICARE

## 2021-04-05 PROCEDURE — 0012A COVID-19, MODERNA VACCINE 100MCG/0.5ML DOSE: CPT | Performed by: FAMILY MEDICINE

## 2021-04-05 PROCEDURE — 91301 COVID-19, MODERNA VACCINE 100MCG/0.5ML DOSE: CPT | Performed by: FAMILY MEDICINE

## 2021-09-07 ENCOUNTER — HOSPITAL ENCOUNTER (OUTPATIENT)
Dept: NUCLEAR MEDICINE | Age: 61
Discharge: HOME | End: 2021-09-07
Payer: MEDICARE

## 2021-09-07 DIAGNOSIS — M25.562 LEFT KNEE PAIN, UNSPECIFIED CHRONICITY: ICD-10-CM

## 2021-09-07 PROCEDURE — A9503 TC99M MEDRONATE: HCPCS | Performed by: ORTHOPAEDIC SURGERY

## 2021-09-07 PROCEDURE — 3430000000 HC RX DIAGNOSTIC RADIOPHARMACEUTICAL: Performed by: ORTHOPAEDIC SURGERY

## 2021-09-07 PROCEDURE — 78315 BONE IMAGING 3 PHASE: CPT

## 2021-09-07 RX ORDER — TC 99M MEDRONATE 20 MG/10ML
26 INJECTION, POWDER, LYOPHILIZED, FOR SOLUTION INTRAVENOUS
Status: COMPLETED | OUTPATIENT
Start: 2021-09-07 | End: 2021-09-07

## 2021-09-07 RX ADMIN — TC 99M MEDRONATE 26 MILLICURIE: 20 INJECTION, POWDER, LYOPHILIZED, FOR SOLUTION INTRAVENOUS at 09:21

## 2021-09-16 ENCOUNTER — HOSPITAL ENCOUNTER (OUTPATIENT)
Age: 61
Setting detail: SPECIMEN
Discharge: HOME | End: 2021-09-16
Payer: MEDICARE

## 2021-09-16 LAB
BASOPHILS ABSOLUTE: 0 K/UL (ref 0–0.2)
BASOPHILS RELATIVE PERCENT: 0.5 %
C-REACTIVE PROTEIN: 12.2 MG/L (ref 0–5.1)
EOSINOPHILS ABSOLUTE: 0.3 K/UL (ref 0–0.6)
EOSINOPHILS RELATIVE PERCENT: 3.9 %
HCT VFR BLD CALC: 41.7 % (ref 36–48)
HEMOGLOBIN: 14.1 G/DL (ref 12–16)
LYMPHOCYTES ABSOLUTE: 2 K/UL (ref 1–5.1)
LYMPHOCYTES RELATIVE PERCENT: 30.2 %
MCH RBC QN AUTO: 31.3 PG (ref 26–34)
MCHC RBC AUTO-ENTMCNC: 33.7 G/DL (ref 31–36)
MCV RBC AUTO: 92.8 FL (ref 80–100)
MONOCYTES ABSOLUTE: 0.5 K/UL (ref 0–1.3)
MONOCYTES RELATIVE PERCENT: 7.1 %
NEUTROPHILS ABSOLUTE: 3.9 K/UL (ref 1.7–7.7)
NEUTROPHILS RELATIVE PERCENT: 58.3 %
PDW BLD-RTO: 14.9 % (ref 12.4–15.4)
PLATELET # BLD: 139 K/UL (ref 135–450)
PMV BLD AUTO: 7.4 FL (ref 5–10.5)
RBC # BLD: 4.5 M/UL (ref 4–5.2)
SEDIMENTATION RATE, ERYTHROCYTE: 10 MM/HR (ref 0–30)
WBC # BLD: 6.7 K/UL (ref 4–11)

## 2021-09-16 PROCEDURE — 85652 RBC SED RATE AUTOMATED: CPT

## 2021-09-16 PROCEDURE — 86140 C-REACTIVE PROTEIN: CPT

## 2021-09-16 PROCEDURE — 85025 COMPLETE CBC W/AUTO DIFF WBC: CPT

## 2021-09-16 PROCEDURE — 36415 COLL VENOUS BLD VENIPUNCTURE: CPT
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
## 2022-10-19 ENCOUNTER — HOSPITAL ENCOUNTER (INPATIENT)
Age: 62
LOS: 3 days | Discharge: HOME | DRG: 392 | End: 2022-10-22
Attending: EMERGENCY MEDICINE | Admitting: INTERNAL MEDICINE
Payer: MEDICARE

## 2022-10-19 ENCOUNTER — APPOINTMENT (OUTPATIENT)
Dept: CT IMAGING | Age: 62
DRG: 392 | End: 2022-10-19
Payer: MEDICARE

## 2022-10-19 DIAGNOSIS — R10.84 GENERALIZED ABDOMINAL PAIN: Primary | ICD-10-CM

## 2022-10-19 DIAGNOSIS — E87.1 HYPONATREMIA: ICD-10-CM

## 2022-10-19 DIAGNOSIS — R79.89 ELEVATED LFTS: ICD-10-CM

## 2022-10-19 PROBLEM — E34.0 CARCINOID SYNDROME (HCC): Status: ACTIVE | Noted: 2022-10-19

## 2022-10-19 PROBLEM — R11.2 NAUSEA VOMITING AND DIARRHEA: Status: ACTIVE | Noted: 2022-10-19

## 2022-10-19 PROBLEM — R19.7 NAUSEA VOMITING AND DIARRHEA: Status: ACTIVE | Noted: 2022-10-19

## 2022-10-19 PROBLEM — E34.00 CARCINOID SYNDROME: Status: ACTIVE | Noted: 2022-10-19

## 2022-10-19 LAB
A/G RATIO: 2 (ref 1.1–2.2)
ALBUMIN SERPL-MCNC: 4.9 G/DL (ref 3.4–5)
ALP BLD-CCNC: 289 U/L (ref 40–129)
ALT SERPL-CCNC: 190 U/L (ref 10–40)
AMORPHOUS: ABNORMAL /HPF
ANION GAP SERPL CALCULATED.3IONS-SCNC: 15 MMOL/L (ref 3–16)
AST SERPL-CCNC: 333 U/L (ref 15–37)
BACTERIA: ABNORMAL /HPF
BASOPHILS ABSOLUTE: 0 K/UL (ref 0–0.2)
BASOPHILS RELATIVE PERCENT: 0.3 %
BILIRUB SERPL-MCNC: 1 MG/DL (ref 0–1)
BILIRUBIN URINE: ABNORMAL
BLOOD, URINE: ABNORMAL
BUN BLDV-MCNC: 20 MG/DL (ref 7–20)
C-REACTIVE PROTEIN: 39.5 MG/L (ref 0–5.1)
CALCIUM SERPL-MCNC: 9.9 MG/DL (ref 8.3–10.6)
CHLORIDE BLD-SCNC: 92 MMOL/L (ref 99–110)
CLARITY: ABNORMAL
CO2: 23 MMOL/L (ref 21–32)
COARSE CASTS, UA: ABNORMAL /LPF (ref 0–2)
COLOR: ABNORMAL
CREAT SERPL-MCNC: 1.2 MG/DL (ref 0.6–1.2)
EOSINOPHILS ABSOLUTE: 0.1 K/UL (ref 0–0.6)
EOSINOPHILS RELATIVE PERCENT: 1.3 %
EPITHELIAL CELLS, UA: ABNORMAL /HPF (ref 0–5)
GFR SERPL CREATININE-BSD FRML MDRD: 51 ML/MIN/{1.73_M2}
GLUCOSE BLD-MCNC: 111 MG/DL (ref 70–99)
GLUCOSE URINE: NEGATIVE MG/DL
HCT VFR BLD CALC: 45.5 % (ref 36–48)
HEMOGLOBIN: 15 G/DL (ref 12–16)
INR BLD: 0.95 (ref 0.87–1.14)
KETONES, URINE: ABNORMAL MG/DL
LEUKOCYTE ESTERASE, URINE: ABNORMAL
LIPASE: 150 U/L (ref 13–60)
LYMPHOCYTES ABSOLUTE: 1.6 K/UL (ref 1–5.1)
LYMPHOCYTES RELATIVE PERCENT: 22.7 %
MCH RBC QN AUTO: 31 PG (ref 26–34)
MCHC RBC AUTO-ENTMCNC: 32.9 G/DL (ref 31–36)
MCV RBC AUTO: 94.1 FL (ref 80–100)
MICROSCOPIC EXAMINATION: YES
MONOCYTES ABSOLUTE: 0.5 K/UL (ref 0–1.3)
MONOCYTES RELATIVE PERCENT: 6.9 %
NEUTROPHILS ABSOLUTE: 4.9 K/UL (ref 1.7–7.7)
NEUTROPHILS RELATIVE PERCENT: 68.8 %
NITRITE, URINE: NEGATIVE
PDW BLD-RTO: 14.4 % (ref 12.4–15.4)
PH UA: 6 (ref 5–8)
PLATELET # BLD: 179 K/UL (ref 135–450)
PMV BLD AUTO: 6.8 FL (ref 5–10.5)
POTASSIUM SERPL-SCNC: 4.3 MMOL/L (ref 3.5–5.1)
PROTEIN UA: 30 MG/DL
PROTHROMBIN TIME: 12.6 SEC (ref 11.7–14.5)
RBC # BLD: 4.84 M/UL (ref 4–5.2)
RBC UA: ABNORMAL /HPF (ref 0–4)
RENAL EPITHELIAL, UA: ABNORMAL /HPF (ref 0–1)
SODIUM BLD-SCNC: 130 MMOL/L (ref 136–145)
SPECIFIC GRAVITY UA: >=1.03 (ref 1–1.03)
SPECIMEN STATUS: NORMAL
TOTAL PROTEIN: 7.3 G/DL (ref 6.4–8.2)
URINE REFLEX TO CULTURE: YES
URINE TYPE: ABNORMAL
UROBILINOGEN, URINE: 1 E.U./DL
WBC # BLD: 7.2 K/UL (ref 4–11)
WBC UA: ABNORMAL /HPF (ref 0–5)

## 2022-10-19 PROCEDURE — 6360000004 HC RX CONTRAST MEDICATION: Performed by: NURSE PRACTITIONER

## 2022-10-19 PROCEDURE — 99285 EMERGENCY DEPT VISIT HI MDM: CPT

## 2022-10-19 PROCEDURE — 85025 COMPLETE CBC W/AUTO DIFF WBC: CPT

## 2022-10-19 PROCEDURE — 84165 PROTEIN E-PHORESIS SERUM: CPT

## 2022-10-19 PROCEDURE — 6370000000 HC RX 637 (ALT 250 FOR IP): Performed by: INTERNAL MEDICINE

## 2022-10-19 PROCEDURE — 84443 ASSAY THYROID STIM HORMONE: CPT

## 2022-10-19 PROCEDURE — 80074 ACUTE HEPATITIS PANEL: CPT

## 2022-10-19 PROCEDURE — 86665 EPSTEIN-BARR CAPSID VCA: CPT

## 2022-10-19 PROCEDURE — 83516 IMMUNOASSAY NONANTIBODY: CPT

## 2022-10-19 PROCEDURE — 85610 PROTHROMBIN TIME: CPT

## 2022-10-19 PROCEDURE — 74177 CT ABD & PELVIS W/CONTRAST: CPT

## 2022-10-19 PROCEDURE — 2580000003 HC RX 258: Performed by: NURSE PRACTITIONER

## 2022-10-19 PROCEDURE — 81001 URINALYSIS AUTO W/SCOPE: CPT

## 2022-10-19 PROCEDURE — 84155 ASSAY OF PROTEIN SERUM: CPT

## 2022-10-19 PROCEDURE — 2580000003 HC RX 258: Performed by: INTERNAL MEDICINE

## 2022-10-19 PROCEDURE — 6360000002 HC RX W HCPCS: Performed by: EMERGENCY MEDICINE

## 2022-10-19 PROCEDURE — 96365 THER/PROPH/DIAG IV INF INIT: CPT

## 2022-10-19 PROCEDURE — 6360000002 HC RX W HCPCS: Performed by: NURSE PRACTITIONER

## 2022-10-19 PROCEDURE — 80053 COMPREHEN METABOLIC PANEL: CPT

## 2022-10-19 PROCEDURE — 87086 URINE CULTURE/COLONY COUNT: CPT

## 2022-10-19 PROCEDURE — 86664 EPSTEIN-BARR NUCLEAR ANTIGEN: CPT

## 2022-10-19 PROCEDURE — 86038 ANTINUCLEAR ANTIBODIES: CPT

## 2022-10-19 PROCEDURE — 83690 ASSAY OF LIPASE: CPT

## 2022-10-19 PROCEDURE — 86140 C-REACTIVE PROTEIN: CPT

## 2022-10-19 PROCEDURE — 86663 EPSTEIN-BARR ANTIBODY: CPT

## 2022-10-19 PROCEDURE — 1200000000 HC SEMI PRIVATE

## 2022-10-19 PROCEDURE — 86015 ACTIN ANTIBODY EACH: CPT

## 2022-10-19 RX ORDER — PROMETHAZINE HYDROCHLORIDE 25 MG/ML
12.5 INJECTION, SOLUTION INTRAMUSCULAR; INTRAVENOUS EVERY 4 HOURS PRN
Status: DISCONTINUED | OUTPATIENT
Start: 2022-10-19 | End: 2022-10-22 | Stop reason: HOSPADM

## 2022-10-19 RX ORDER — ROSUVASTATIN CALCIUM 10 MG/1
40 TABLET, COATED ORAL NIGHTLY
Status: DISCONTINUED | OUTPATIENT
Start: 2022-10-19 | End: 2022-10-22 | Stop reason: HOSPADM

## 2022-10-19 RX ORDER — POTASSIUM CHLORIDE 20 MEQ/1
40 TABLET, EXTENDED RELEASE ORAL PRN
Status: DISCONTINUED | OUTPATIENT
Start: 2022-10-19 | End: 2022-10-22 | Stop reason: HOSPADM

## 2022-10-19 RX ORDER — 0.9 % SODIUM CHLORIDE 0.9 %
1000 INTRAVENOUS SOLUTION INTRAVENOUS ONCE
Status: COMPLETED | OUTPATIENT
Start: 2022-10-19 | End: 2022-10-19

## 2022-10-19 RX ORDER — ONDANSETRON 2 MG/ML
4 INJECTION INTRAMUSCULAR; INTRAVENOUS ONCE
Status: COMPLETED | OUTPATIENT
Start: 2022-10-19 | End: 2022-10-19

## 2022-10-19 RX ORDER — PROMETHAZINE HYDROCHLORIDE 25 MG/1
12.5 TABLET ORAL EVERY 4 HOURS PRN
Status: DISCONTINUED | OUTPATIENT
Start: 2022-10-19 | End: 2022-10-22 | Stop reason: HOSPADM

## 2022-10-19 RX ORDER — SUCRALFATE 1 G/1
1 TABLET ORAL EVERY 6 HOURS SCHEDULED
Status: DISCONTINUED | OUTPATIENT
Start: 2022-10-19 | End: 2022-10-22 | Stop reason: HOSPADM

## 2022-10-19 RX ORDER — CLONAZEPAM 0.5 MG/1
0.5 TABLET ORAL 2 TIMES DAILY PRN
Status: DISCONTINUED | OUTPATIENT
Start: 2022-10-19 | End: 2022-10-20

## 2022-10-19 RX ORDER — PANTOPRAZOLE SODIUM 40 MG/1
40 TABLET, DELAYED RELEASE ORAL
Status: DISCONTINUED | OUTPATIENT
Start: 2022-10-20 | End: 2022-10-20

## 2022-10-19 RX ORDER — SODIUM CHLORIDE, SODIUM LACTATE, POTASSIUM CHLORIDE, CALCIUM CHLORIDE 600; 310; 30; 20 MG/100ML; MG/100ML; MG/100ML; MG/100ML
INJECTION, SOLUTION INTRAVENOUS CONTINUOUS
Status: DISCONTINUED | OUTPATIENT
Start: 2022-10-19 | End: 2022-10-22 | Stop reason: HOSPADM

## 2022-10-19 RX ORDER — ENOXAPARIN SODIUM 100 MG/ML
40 INJECTION SUBCUTANEOUS DAILY
Status: DISCONTINUED | OUTPATIENT
Start: 2022-10-20 | End: 2022-10-22 | Stop reason: HOSPADM

## 2022-10-19 RX ORDER — ACETAMINOPHEN 325 MG/1
650 TABLET ORAL EVERY 6 HOURS PRN
Status: DISCONTINUED | OUTPATIENT
Start: 2022-10-19 | End: 2022-10-22 | Stop reason: HOSPADM

## 2022-10-19 RX ORDER — DULOXETIN HYDROCHLORIDE 60 MG/1
120 CAPSULE, DELAYED RELEASE ORAL DAILY
Status: DISCONTINUED | OUTPATIENT
Start: 2022-10-20 | End: 2022-10-22 | Stop reason: HOSPADM

## 2022-10-19 RX ORDER — MORPHINE SULFATE 4 MG/ML
4 INJECTION, SOLUTION INTRAMUSCULAR; INTRAVENOUS ONCE
Status: COMPLETED | OUTPATIENT
Start: 2022-10-19 | End: 2022-10-19

## 2022-10-19 RX ORDER — LOPERAMIDE HYDROCHLORIDE 2 MG/1
2 CAPSULE ORAL 4 TIMES DAILY PRN
Status: DISCONTINUED | OUTPATIENT
Start: 2022-10-19 | End: 2022-10-22 | Stop reason: HOSPADM

## 2022-10-19 RX ORDER — LEVOTHYROXINE SODIUM 0.12 MG/1
125 TABLET ORAL DAILY
Status: DISCONTINUED | OUTPATIENT
Start: 2022-10-20 | End: 2022-10-22 | Stop reason: HOSPADM

## 2022-10-19 RX ORDER — ATENOLOL 25 MG/1
100 TABLET ORAL 2 TIMES DAILY
Status: DISCONTINUED | OUTPATIENT
Start: 2022-10-19 | End: 2022-10-22 | Stop reason: HOSPADM

## 2022-10-19 RX ORDER — POTASSIUM CHLORIDE 7.45 MG/ML
10 INJECTION INTRAVENOUS PRN
Status: DISCONTINUED | OUTPATIENT
Start: 2022-10-19 | End: 2022-10-22 | Stop reason: HOSPADM

## 2022-10-19 RX ORDER — ACETAMINOPHEN 650 MG/1
650 SUPPOSITORY RECTAL EVERY 6 HOURS PRN
Status: DISCONTINUED | OUTPATIENT
Start: 2022-10-19 | End: 2022-10-22 | Stop reason: HOSPADM

## 2022-10-19 RX ORDER — ONDANSETRON 4 MG/1
4 TABLET, ORALLY DISINTEGRATING ORAL EVERY 4 HOURS PRN
Status: DISCONTINUED | OUTPATIENT
Start: 2022-10-19 | End: 2022-10-22 | Stop reason: HOSPADM

## 2022-10-19 RX ORDER — MAGNESIUM SULFATE 1 G/100ML
1000 INJECTION INTRAVENOUS PRN
Status: DISCONTINUED | OUTPATIENT
Start: 2022-10-19 | End: 2022-10-22 | Stop reason: HOSPADM

## 2022-10-19 RX ORDER — ONDANSETRON 2 MG/ML
4 INJECTION INTRAMUSCULAR; INTRAVENOUS EVERY 4 HOURS PRN
Status: DISCONTINUED | OUTPATIENT
Start: 2022-10-19 | End: 2022-10-22 | Stop reason: HOSPADM

## 2022-10-19 RX ORDER — ATENOLOL 25 MG/1
37.5 TABLET ORAL 2 TIMES DAILY
Status: DISCONTINUED | OUTPATIENT
Start: 2022-10-19 | End: 2022-10-19

## 2022-10-19 RX ORDER — DICYCLOMINE HYDROCHLORIDE 10 MG/ML
20 INJECTION INTRAMUSCULAR ONCE
Status: COMPLETED | OUTPATIENT
Start: 2022-10-19 | End: 2022-10-19

## 2022-10-19 RX ORDER — SODIUM CHLORIDE 9 MG/ML
INJECTION, SOLUTION INTRAVENOUS PRN
Status: DISCONTINUED | OUTPATIENT
Start: 2022-10-19 | End: 2022-10-22 | Stop reason: HOSPADM

## 2022-10-19 RX ORDER — SODIUM CHLORIDE 0.9 % (FLUSH) 0.9 %
10 SYRINGE (ML) INJECTION PRN
Status: DISCONTINUED | OUTPATIENT
Start: 2022-10-19 | End: 2022-10-22 | Stop reason: HOSPADM

## 2022-10-19 RX ADMIN — MORPHINE SULFATE 4 MG: 4 INJECTION, SOLUTION INTRAMUSCULAR; INTRAVENOUS at 20:17

## 2022-10-19 RX ADMIN — CEFTRIAXONE SODIUM 1000 MG: 1 INJECTION, POWDER, FOR SOLUTION INTRAMUSCULAR; INTRAVENOUS at 19:19

## 2022-10-19 RX ADMIN — ONDANSETRON 4 MG: 2 INJECTION INTRAMUSCULAR; INTRAVENOUS at 20:17

## 2022-10-19 RX ADMIN — DICYCLOMINE HYDROCHLORIDE 20 MG: 20 INJECTION INTRAMUSCULAR at 20:16

## 2022-10-19 RX ADMIN — ROSUVASTATIN CALCIUM 40 MG: 10 TABLET, FILM COATED ORAL at 22:03

## 2022-10-19 RX ADMIN — IOPAMIDOL 75 ML: 755 INJECTION, SOLUTION INTRAVENOUS at 17:23

## 2022-10-19 RX ADMIN — SODIUM CHLORIDE 1000 ML: 9 INJECTION, SOLUTION INTRAVENOUS at 19:18

## 2022-10-19 RX ADMIN — ATENOLOL 100 MG: 25 TABLET ORAL at 22:02

## 2022-10-19 RX ADMIN — SODIUM CHLORIDE, POTASSIUM CHLORIDE, SODIUM LACTATE AND CALCIUM CHLORIDE: 600; 310; 30; 20 INJECTION, SOLUTION INTRAVENOUS at 21:50

## 2022-10-19 RX ADMIN — SUCRALFATE 1 G: 1 TABLET ORAL at 22:02

## 2022-10-19 ASSESSMENT — PAIN SCALES - GENERAL
PAINLEVEL_OUTOF10: 4
PAINLEVEL_OUTOF10: 3

## 2022-10-19 ASSESSMENT — PAIN DESCRIPTION - DESCRIPTORS: DESCRIPTORS: BURNING

## 2022-10-19 ASSESSMENT — PAIN - FUNCTIONAL ASSESSMENT: PAIN_FUNCTIONAL_ASSESSMENT: 0-10

## 2022-10-19 ASSESSMENT — PAIN DESCRIPTION - LOCATION: LOCATION: ABDOMEN

## 2022-10-19 NOTE — ED PROVIDER NOTES
EMERGENCY DEPARTMENT ENCOUNTER      This patient was seen and evaluated by the attending physician. Pt Name: Jessy Zavala  MRN: [id number]  [place] 1960  Date of evaluation: 10/19/2022  Provider: EVANGELINA Obrien CNP-C  PCP: Court Barajas MD  ED Attendin [facility],     History provided by the patient. CHIEF COMPLAINT:     Chief Complaint   Patient presents with    Nausea     Patient states she had upper GI scope on Monday, states she has had burning sensation in upper abdomen this morning with nausea. GI doctor advised her to come to the ED for evaluation. HISTORY OF PRESENT ILLNESS:      Jessy Zavala is a 60 y.o. female who presents [facility]  ED with complaints of nausea and vomiting as well as persistent diarrhea. Patient states that she has been having persistent diarrhea with nausea and vomiting as well as some epigastric burning. She had a recent EGD on Monday, she states that her doctor referred her to the ED. She is here for further evaluation. Realne Harris  Severity:3  Duration:several days  Modifying factors:none noted    Nursing Notes were reviewed     REVIEW OF SYSTEMS:     Review of Systems  All systems, atotal of 10, are reviewed and negative except for those that were just noted in history present illness.         PAST MEDICAL HISTORY:     Past Medical History:   Diagnosis Date    Anticoagulant long-term use     Anxiety     Arthritis     Cerebrovascular disease 2014    stroke    Depression     Endometriosis     Fibromyalgia     Generalized osteoarthritis 3/6/2015    GERD (gastroesophageal reflux disease)     H/O suicide attempt     Headache(784.0)     Hyperlipidemia     Hypertension     Hypothyroidism     Irritable bowel syndrome     Migraines     Obesity     Patent foramen ovale 2014    PONV (postoperative nausea and vomiting)          SURGICAL HISTORY:      Past Surgical History:   Procedure Laterality Date    CHOLECYSTECTOMY  1990's    laparoscopic    COLONOSCOPY  2014    COLONOSCOPY  06/29/2017    random biopsies    COLONOSCOPY  01/23/2017    polyp    HYSTERECTOMY (CERVIX STATUS UNKNOWN)  1980's    total vaginal hysterectomy, retains both ovaries, menorrhagia, fibroid tumors    JOINT REPLACEMENT Left 06/28/2018    LTK     KNEE CARTILAGE SURGERY Right     KNEE SURGERY      THYROIDECTOMY           CURRENT MEDICATIONS:       Previous Medications    ATENOLOL (TENORMIN) 25 MG TABLET    Take 50 mg by mouth daily 50mgs in morning and 25mgs in evening    CHOLECALCIFEROL (VITAMIN D) 2000 UNITS CAPS CAPSULE    Take 1 capsule by mouth daily    CLONAZEPAM (KLONOPIN) 0.5 MG TABLET    Take 1 tablet by mouth 2 times daily as needed    CLOPIDOGREL (PLAVIX) 75 MG TABLET    Take 75 mg by mouth daily    DICLOFENAC SODIUM (VOLTAREN) 1 % GEL    Apply 4 g topically 3 times daily as needed for Pain Apply to LEFT knee    DULOXETINE (CYMBALTA) 60 MG CAPSULE    Take 2 capsules by mouth daily    LAMOTRIGINE (LAMICTAL) 150 MG TABLET    Take 150 mg by mouth daily    LEVOTHYROXINE (SYNTHROID) 125 MCG TABLET    TAKE 1 TABLET BY MOUTH EVERY DAY    ONDANSETRON (ZOFRAN) 4 MG TABLET    Take 1 tablet by mouth daily as needed for Nausea or Vomiting    ORPHENADRINE (NORFLEX) 100 MG EXTENDED RELEASE TABLET    Take 100 mg by mouth 2 times daily as needed     PANTOPRAZOLE SODIUM (PROTONIX) 40 MG PACK PACKET    Take 40 mg by mouth every morning (before breakfast)    PROMETHAZINE (PHENERGAN) 25 MG TABLET    Take 25 mg by mouth every 6 hours as needed for Nausea    ROSUVASTATIN (CRESTOR) 40 MG TABLET    Take 40 mg by mouth every evening    UBROGEPANT 100 MG TABS    Take 100 mg by mouth as needed (up to one dose)    UBROGEPANT 50 MG TABS    Take 50 mg by mouth         ALLERGIES:    Latex, Codeine, Ketorolac, Ketorolac tromethamine, Kiwi extract, Nsaids, Penicillins, and Tolmetin    FAMILY HISTORY:       Family History   Problem Relation Age of Onset Clotting Disorder Mother     Other Mother         lung disease    Cancer Father         colon, brain    Arthritis Father         polio    Heart Disease Father     Heart Disease Brother     Obesity Brother     Substance Abuse Brother           SOCIAL HISTORY:       Social History     Socioeconomic History    Marital status:      Spouse name: None    Number of children: None    Years of education: None    Highest education level: None   Occupational History    Occupation: retired   Tobacco Use    Smoking status: Never    Smokeless tobacco: Never   Vaping Use    Vaping Use: Never used   Substance and Sexual Activity    Alcohol use: Yes     Alcohol/week: 2.0 standard drinks     Types: 2 Glasses of wine per week     Comment: socially    Drug use: No    Sexual activity: Not Currently     Birth control/protection: Post-menopausal       SCREENINGS:   Elisabet Coma Scale  Eye Opening: Spontaneous  Best Verbal Response: Oriented  Best Motor Response: Obeys commands  [place] Coma Scale Score: 15        PHYSICAL EXAM:       ED Triage Vitals [10/19/22 1353]   BP Temp Temp Source Heart Rate Resp SpO2 Height Weight   92/74 98.1 °F (36.7 °C) Oral 80 17 100 % -- 153 lb (69.4 kg)       Physical Exam    CONSTITUTIONAL: Awake and alert. Cooperative. Well-developed. Well-nourished. Vitals:    10/19/22 1353 10/19/22 1552 10/19/22 1804   BP: 92/74 139/69 104/67   Pulse: 80 80 66   Resp: 17  18   Temp: 98.1 °F (36.7 °C)     TempSrc: Oral     SpO2: 100% 99% 100%   Weight: 153 lb (69.4 kg)       HENT: Normocephalic. Atraumatic. External ears normal, without discharge. TMs clear bilaterally. No nasal discharge. Oropharynx clear, no erythema. Mucous membranes moist.  EYES: Conjunctiva non-injected, no lid abnormalities noted. No scleral icterus. PERRL. EOM's grossly intact. Anterior chambers clear. NECK: Supple. Normal ROM. No meningismus. No thyroid tenderness or swelling noted. CARDIOVASCULAR: RRR. No Murmer.   PULMONARY/CHEST WALL: Effort normal. No tachypnea. Lungs clear to ausculation. ABDOMEN: Normal BS. Soft. Nondistended. Mild diffuse tenderness throughout the abdomen. . No guarding. No hernias noted. No splenomegaly. Back: Spine is midline. No ecchymosis. No crepitus on palpation. No obvious subluxation of vertebral column. No saddle anesthesia or evidence of cauda equina. /ANORECTAL: Not assessed  MUSKULOSKELETAL: Normal ROM. No acute deformities. No edema. No tenderness to palpate. SKIN: Warm and dry. NEUROLOGICAL:  GCS 15. CN II-XII grossly intact. Strength is 5/5 in allextremities and sensation is intact. PSYCHIATRIC: Normal affect, normal insight and judgement. Alert andoriented x 3.         DIAGNOSTIC RESULTS:     LABS:    Results for orders placed or performed during the hospital encounter of 10/19/22   Urinalysis with Reflex to Culture    Specimen: Urine, clean catch   Result Value Ref Range    Color, UA DARK YELLOW (A) Straw/Yellow    Clarity, UA SL CLOUDY (A) Clear    Glucose, Ur Negative Negative mg/dL    Bilirubin Urine MODERATE (A) Negative    Ketones, Urine TRACE (A) Negative mg/dL    Specific Gravity, UA >=1.030 1.005 - 1.030    Blood, Urine MODERATE (A) Negative    pH, UA 6.0 5.0 - 8.0    Protein, UA 30 (A) Negative mg/dL    Urobilinogen, Urine 1.0 <2.0 E.U./dL    Nitrite, Urine Negative Negative    Leukocyte Esterase, Urine MODERATE (A) Negative    Microscopic Examination YES     Urine Type NotGiven     Urine Reflex to Culture Yes    CBC with Auto Differential   Result Value Ref Range    WBC 7.2 4.0 - 11.0 K/uL    RBC 4.84 4.00 - 5.20 M/uL    Hemoglobin 15.0 12.0 - 16.0 g/dL    Hematocrit 45.5 36.0 - 48.0 %    MCV 94.1 80.0 - 100.0 fL    MCH 31.0 26.0 - 34.0 pg    MCHC 32.9 31.0 - 36.0 g/dL    RDW 14.4 12.4 - 15.4 %    Platelets 221 103 - 933 K/uL    MPV 6.8 5.0 - 10.5 fL    Neutrophils % 68.8 %    Lymphocytes % 22.7 %    Monocytes % 6.9 %    Eosinophils % 1.3 %    Basophils % 0.3 %    Neutrophils Absolute 4.9 1.7 - 7.7 K/uL    Lymphocytes Absolute 1.6 1.0 - 5.1 K/uL    Monocytes Absolute 0.5 0.0 - 1.3 K/uL    Eosinophils Absolute 0.1 0.0 - 0.6 K/uL    Basophils Absolute 0.0 0.0 - 0.2 K/uL   Comprehensive Metabolic Panel   Result Value Ref Range    Sodium 130 (L) 136 - 145 mmol/L    Potassium 4.3 3.5 - 5.1 mmol/L    Chloride 92 (L) 99 - 110 mmol/L    CO2 23 21 - 32 mmol/L    Anion Gap 15 3 - 16    Glucose 111 (H) 70 - 99 mg/dL    BUN 20 7 - 20 mg/dL    Creatinine 1.2 0.6 - 1.2 mg/dL    Est, Glom Filt Rate 51 (A) >60    Calcium 9.9 8.3 - 10.6 mg/dL    Total Protein 7.3 6.4 - 8.2 g/dL    Albumin 4.9 3.4 - 5.0 g/dL    Albumin/Globulin Ratio 2.0 1.1 - 2.2    Total Bilirubin 1.0 0.0 - 1.0 mg/dL    Alkaline Phosphatase 289 (H) 40 - 129 U/L     (H) 10 - 40 U/L     (H) 15 - 37 U/L   Sample possible blood bank testing   Result Value Ref Range    Specimen Status HOLGER    Microscopic Urinalysis   Result Value Ref Range    Coarse Casts, UA 3-5 (A) 0 - 2 /LPF    WBC, UA  (A) 0 - 5 /HPF    RBC, UA 11-20 (A) 0 - 4 /HPF    Epithelial Cells, UA 21-50 (A) 0 - 5 /HPF    Renal Epithelial, UA 11-20 (A) 0 - 1 /HPF    Bacteria, UA 4+ (A) None Seen /HPF    Amorphous, UA 1+ /HPF   Lipase   Result Value Ref Range    Lipase 150.0 (H) 13.0 - 60.0 U/L         RADIOLOGY:  All x-ray studies are viewed/reviewedby me. Formal interpretations per the radiologist are as follows:      CT ABDOMEN PELVIS W IV CONTRAST Additional Contrast? None   Final Result   1. No CT evidence of an acute intra-abdominal or intrapelvic process. 2. Mild diverticulosis coli without CT evidence of acute diverticulitis. 3.  Mild intra and extrahepatic bile duct dilatation status post   cholecystectomy typical of reservoir effect. 4.  No findings to suggest acute appendicitis; no ureter calculus or   hydronephrosis.                  EKG:  See EKG interpretation by an attending phsyician      PROCEDURES:   N/A    CRITICAL CARE TIME:   N/A    CONSULTS:  IP CONSULT TO GI    Is this patient to be included in the SEP-1 Core Measure due to severe sepsis or septic shock? No   Exclusion criteria - the patient is NOT to be included for SEP-1 Core Measure due to: Infection is not suspected     EMERGENCYDEPARTMENT COURSE and DIFFERENTIAL DIAGNOSIS/MDM:   Vitals:    Vitals:    10/19/22 1353 10/19/22 1552 10/19/22 1804   BP: 92/74 139/69 104/67   Pulse: 80 80 66   Resp: 17  18   Temp: 98.1 °F (36.7 °C)     TempSrc: Oral     SpO2: 100% 99% 100%   Weight: 153 lb (69.4 kg)         Patient was given the following medications:  Medications   0.9 % sodium chloride bolus (has no administration in time range)   sucralfate (CARAFATE) tablet 1 g (has no administration in time range)   pantoprazole (PROTONIX) tablet 40 mg (has no administration in time range)   cefTRIAXone (ROCEPHIN) 1,000 mg in dextrose 5 % 50 mL IVPB mini-bag (has no administration in time range)   iopamidol (ISOVUE-370) 76 % injection 75 mL (75 mLs IntraVENous Given 10/19/22 1723)         Patient was evaluated by both myself and [address] [place] [address]. Patient presented to the emergency department today with complaints of generalized abdominal pain with nausea and vomiting and diarrhea. Patient had a recent EGD. Her work-up today did show bumped LFTs, her AST is over 300, I did speak with Dr. Cheng Tomas who is familiar with this patient he recommended admission the hospital.  Her CT was unremarkable. Her vital signs been stable. She was given IV fluids, her urinalysis was concerning for possible infection I did start her on some IV antibiotics. She will be admitted to the hospital for further evaluation and treatment. She was evaluated by the attending physician who also agrees with this plan of care    Patient laboratory studies, radiographic imaging, andassessment were all discussed with the patient and/or patient family.   There was shared decision-making between myself, the attending physician, as well as the patient and/or their surrogate and we are all in agreement with admission. There was an opportunity for questions and all questions were answered to the best of my ability and to the satisfaction of the patient and/or patient family. FINAL IMPRESSION:      1. Generalized abdominal pain    2. Hyponatremia    3. Elevated LFTs          DISPOSITION/PLAN:   DISPOSITION      PATIENT REFERRED TO:  No follow-up provider specified.     DISCHARGE MEDICATIONS:  New Prescriptions    No medications on file                  (Please note that portions of this note were completed with a voice recognition program.  Efforts were made to edit the dictations, but occasionally words are mis-transcribed.)    EVANGELINA Navas CNP-C (electronically signed)         EVANGELINA Navas CNP  10/19/22 1922

## 2022-10-19 NOTE — ED NOTES
@[id number] Called GI Per Akosua Dave CNP  RE:Dr. Cody---epigastric burning  @1800 Nery Blackburn called back and spoke to Akosua Dave CNP

## 2022-10-19 NOTE — H&P
Hospital Medicine History & Physical      Patient: Garrett Sharp  :  1960  MRN:  [id number]    Date of Service: 10/19/22    Chief Complaint   Patient presents with    Nausea     Patient states she had upper GI scope on Monday, states she has had burning sensation in upper abdomen this morning with nausea. GI doctor advised her to come to the ED for evaluation. HISTORY OF PRESENT ILLNESS:    Garrett Sharp is a 60 y.o. female. She presented to the ER for abdominal discomfort, nausea, vomiting, and diarrhea. She has a h/o IBS so is accustomed to episodic diarrhea. However, over the summer she developed non-episodic diarrhea. She has had perrsistent and worsening abdominal cramping and diarrhea all summer. She has also been having episodic \"hot flashes\" where she feels flushed and hot. This is also often accompanied by generalized tingling sensations. There are times when she also becomes nauseated. Patient's PCP has been working her up and has found strong evidence that the patient's symptoms are a carcinoid syndrome. She has had very elevated chromogranin A levels since at least . She was referred to GI, Dr. Gloria Betancur, who has continued the work-up and recommended she be hospitalized today. Patient has had a whole-body PET/CT  and an EGD. Neither successfully located a tumor. Review of Systems:  All pertinent positives and negatives are as noted in the HPI section. All other systems were reviewed and are negative.     Past Medical History:   Diagnosis Date    Anticoagulant long-term use     Anxiety     Arthritis     Cerebrovascular disease 2014    stroke    Depression     Endometriosis     Fibromyalgia     Generalized osteoarthritis 3/6/2015    GERD (gastroesophageal reflux disease)     H/O suicide attempt     Headache(784.0)     Hyperlipidemia     Hypertension     Hypothyroidism     Irritable bowel syndrome     Migraines     Obesity     Patent foramen ovale 2014 PONV (postoperative nausea and vomiting)        Past Surgical History:   Procedure Laterality Date    CHOLECYSTECTOMY  1990's    laparoscopic    COLONOSCOPY  2014    COLONOSCOPY  06/29/2017    random biopsies    COLONOSCOPY  01/23/2017    polyp    HYSTERECTOMY (CERVIX STATUS UNKNOWN)  1980's    total vaginal hysterectomy, retains both ovaries, menorrhagia, fibroid tumors    JOINT REPLACEMENT Left 06/28/2018    LTK     KNEE CARTILAGE SURGERY Right     KNEE SURGERY      THYROIDECTOMY           Prior to Admission medications    Medication Sig Start Date End Date Taking?  Authorizing Provider   levothyroxine (SYNTHROID) 125 MCG tablet TAKE 1 TABLET BY MOUTH EVERY DAY 12/10/20   Kala Celaya MD   atenolol (TENORMIN) 25 MG tablet Take 50 mg by mouth daily 50mgs in morning and 25mgs in evening    Historical Provider, MD   pantoprazole sodium (PROTONIX) 40 MG PACK packet Take 40 mg by mouth every morning (before breakfast)    Historical Provider, MD   rosuvastatin (CRESTOR) 40 MG tablet Take 40 mg by mouth every evening    Historical Provider, MD   Ubrogepant 50 MG TABS Take 50 mg by mouth    Historical Provider, MD   Ubrogepant 100 MG TABS Take 100 mg by mouth as needed (up to one dose)    Historical Provider, MD   ondansetron (ZOFRAN) 4 MG tablet Take 1 tablet by mouth daily as needed for Nausea or Vomiting 5/11/19   Markie Bocanegra DO   clopidogrel (PLAVIX) 75 MG tablet Take 75 mg by mouth daily    Historical Provider, MD   orphenadrine (NORFLEX) 100 MG extended release tablet Take 100 mg by mouth 2 times daily as needed     Historical Provider, MD   promethazine (PHENERGAN) 25 MG tablet Take 25 mg by mouth every 6 hours as needed for Nausea    Historical Provider, MD   diclofenac sodium (VOLTAREN) 1 % GEL Apply 4 g topically 3 times daily as needed for Pain Apply to LEFT knee 8/6/18 10/19/18  DESIREE Farley   lamoTRIgine (LAMICTAL) 150 MG tablet Take 150 mg by mouth daily    Historical Provider, MD DULoxetine (CYMBALTA) 60 MG capsule Take 2 capsules by mouth daily 6/28/16   Gisella Nam MD   Cholecalciferol (VITAMIN D) 2000 UNITS CAPS capsule Take 1 capsule by mouth daily    Historical Provider, MD   clonazePAM (KLONOPIN) 0.5 MG tablet Take 1 tablet by mouth 2 times daily as needed 5/18/16   Gisella Nam MD       Allergies:   Latex, Codeine, Ketorolac, Ketorolac tromethamine, Kiwi extract, Nsaids, Penicillins, and Tolmetin    Social:   reports that she has never smoked. She has never used smokeless tobacco.   reports current alcohol use of about 2.0 standard drinks per week. Social History     Substance and Sexual Activity   Drug Use No       Family History   Problem Relation Age of Onset    Clotting Disorder Mother     Other Mother         lung disease    Cancer Father         colon, brain    Arthritis Father         polio    Heart Disease Father     Heart Disease Brother     Obesity Brother     Substance Abuse Brother        PHYSICAL EXAM:  I performed this physical examination. Vitals:  Patient Vitals for the past 24 hrs:   BP Temp Temp src Pulse Resp SpO2 Weight   10/19/22 1804 104/67 -- -- 66 18 100 % --   10/19/22 1552 139/69 -- -- 80 -- 99 % --   10/19/22 1353 92/74 98.1 °F (36.7 °C) Oral 80 17 100 % 153 lb (69.4 kg)     Room air    GEN:  Appearance:  age appropriate female in NAD . Level of Consciousness:  alert . Orientation:  full    HEENT: Sclera anicteric.  no conjunctival chemosis. moist mucus membranes. no specific or diagnostic oral lesions. NECK:  no signs of meningismus. Jugular veins not distended. Carotid pulses  2+.  no cervical lymphadenopathy. no thyromegaly. CV:  regular rhythm. normal S1 & S2.    no murmur. no rub.  no gallop. PULM:  Chest excursion is symmetric. Breath sounds are vesicular  Adventitious sounds:  none    AB:  Abdominal shape is normal.  Bowel sounds are active. Generally soft to palpation. no tenderness is present. no involuntary guarding. no rebound guarding. EXTR:  Skin is warm. Capillary refill brisk. no specific or pathognomic rash. no clubbing. no pitting edema. no active wound or ulcer. Pulses 2+ x 4      LABS:  Lab Results   Component Value Date    WBC 7.2 10/19/2022    HGB 15.0 10/19/2022    HCT 45.5 10/19/2022    MCV 94.1 10/19/2022     10/19/2022     Lab Results   Component Value Date    CREATININE 1.2 10/19/2022    BUN 20 10/19/2022     (L) 10/19/2022    K 4.3 10/19/2022    CL 92 (L) 10/19/2022    CO2 23 10/19/2022     Lab Results   Component Value Date     (H) 10/19/2022     (H) 10/19/2022    ALKPHOS 289 (H) 10/19/2022    BILITOT 1.0 10/19/2022     Lab Results   Component Value Date    TROPONINI <0.01 01/17/2021             IMAGING:  CT ABDOMEN PELVIS W IV CONTRAST Additional Contrast? None    Result Date: 10/19/2022  EXAMINATION: CT OF THE ABDOMEN AND PELVIS WITH CONTRAST 10/19/2022 5:14 pm TECHNIQUE: CT of the abdomen and pelvis was performed with the administration of intravenous contrast. Multiplanar reformatted images are provided for review. Automated exposure control, iterative reconstruction, and/or weight based adjustment of the mA/kV was utilized to reduce the radiation dose to as low as reasonably achievable. COMPARISON: None. HISTORY: ORDERING SYSTEM PROVIDED HISTORY: abd pain/n/v abnormal LFTs Decision Support Exception - unselect if not a suspected or confirmed emergency medical condition->Emergency Medical Condition (MA) Reason for Exam: epigastric pain,recent endoscopy Additional signs and symptoms: diarrhea and vomiting Relevant Medical/Surgical History: cholecystectomy FINDINGS: Lower Chest: Visualized portions of the lungs are clear. Cardiac and posterior mediastinal structures visualized are unremarkable.  Liver: Classical appearing benign cavernous hemangioma right hepatic dome 13 mm axial image 19 (benign finding requiring no additional evaluation or follow-up). Probable cyst, too small for definitive characterization at 7 mm inferior right hepatic lobe axial image 51. No suspicious hepatic lesion evident. Mild intra and extrahepatic bile duct dilatation, common duct 7 mm diameter. Other organs: Status post cholecystectomy. Unremarkable appearance of the right and left kidneys, pancreas, adrenal glands and spleen. GI/Bowel: The stomach and small bowel appear unremarkable. No diffuse or focal small bowel wall thickening or inflammatory changes evident. No obstruction is seen. The appendix is visualized right lower quadrant, unremarkable in appearance. There are a few scattered colonic diverticula without definite inflammatory changes associated. The colon appears otherwise unremarkable. Pelvis: No pneumoperitoneum. Partially filled urinary bladder appears unremarkable. No pelvic adenopathy or free fluid is seen. Hysterectomy. Peritoneum/Retroperitoneum: No pneumoperitoneum. Unremarkable appearance of the aorta. No aneurysm. Unremarkable appearance of the IVC. No adenopathy or fluid. Bones/Soft Tissues: No acute superficial soft tissue or osseous structure abnormality evident. 1.  No CT evidence of an acute intra-abdominal or intrapelvic process. 2. Mild diverticulosis coli without CT evidence of acute diverticulitis. 3.  Mild intra and extrahepatic bile duct dilatation status post cholecystectomy typical of reservoir effect. 4.  No findings to suggest acute appendicitis; no ureter calculus or hydronephrosis. [facility] PET / CT SCAN SKULL TO THIGH 10/4/2022    CLINICAL HISTORY:  Neuroendocrine tumor. TECHNIQUE:  The patient was administered 4.0 mCi of gallium-68 Dotatate intravenously into the left arm. After a 60 minute delay, PET imaging was obtained from the skull through the proximal thighs. A noncontrast CT scan of these regions was performed for purposes of attenuation correction and anatomic localization.  The PET and CT images were coregistered and reviewed as a fused image data set in 3 planes. COMPARISON:  CT scan of the chest abdomen and pelvis dated 9/7/2022     FINDINGS:  No abnormal radiopharmaceutical uptake is identified. Physiologic activity is identified within the pituitary gland, liver, spleen, adrenal glands, kidneys and bladder. There are some physiologic uptake present within bowel. Patient is status post a cholecystectomy. There is atrophy of the pancreas. Patient is status post a hysterectomy. There is sigmoid colon diverticulosis. IMPRESSION:     1. No PET evidence of dotatate avid neoplastic disease. I directly reviewed all recent imaging studies as well as pertinent prior studies. Radiology reports may or may not be available at the time of my review. Active Hospital Problems    Diagnosis Date Noted    Carcinoid syndrome ([facility] [address] [E34.0] 10/19/2022     Priority: Medium    Nausea vomiting and diarrhea [R11.2, R19.7] 10/19/2022     Priority: Medium    History of CVA in adulthood [Z86.73] 01/17/2021    GERD (gastroesophageal reflux disease) [K21.9]     Hypothyroidism [E03.9]     Irritable bowel syndrome [K58.9] 04/16/2012       ASSESSMENT & PLAN  Carcinoid Syndrome  -  Clinical and serologic evidence for a neuroendocrine tumor and carcinoid syndrome, but the tumor has not yet been located. Will proceed with octreotide scan. -  Input from GI, Dr. Namrata More, is appreciated. -  Antiemetics, antispasmodics, antimotility agents all made available as needed for symptom control.  -  Maintenance LR @ 125 mL/hr until patient is reliably able to take PO.    H/O Ischemic CVA  -  Continue home plavix, statin. HTN  -  Continue home atenolol 100mg BID. Continue lisinopril 40mg daily once patient is able to reliably tolerate oral intake. Migraine HA  - continue home lamotrigine. Depression/Anxiety  -  Continue home duloxetine 120mg daily. Memory Loss  -  Continue home memantine 10mg BID.     DVT prophylaxis: SCDs, lovenox  Code Status:  Full  Disposition:  Inpatient w/ anticipated d/c back to home.     Justin Espinal MD MD

## 2022-10-20 LAB
A/G RATIO: 2 (ref 1.1–2.2)
ALBUMIN SERPL-MCNC: 3.6 G/DL (ref 3.1–4.9)
ALBUMIN SERPL-MCNC: 4 G/DL (ref 3.4–5)
ALP BLD-CCNC: 218 U/L (ref 40–129)
ALPHA-1-GLOBULIN: 0.3 G/DL (ref 0.2–0.4)
ALPHA-2-GLOBULIN: 1.2 G/DL (ref 0.4–1.1)
ALT SERPL-CCNC: 114 U/L (ref 10–40)
ANION GAP SERPL CALCULATED.3IONS-SCNC: 14 MMOL/L (ref 3–16)
AST SERPL-CCNC: 117 U/L (ref 15–37)
BETA GLOBULIN: 1 G/DL (ref 0.9–1.6)
BILIRUB SERPL-MCNC: 0.3 MG/DL (ref 0–1)
BUN BLDV-MCNC: 17 MG/DL (ref 7–20)
C DIFF TOXIN/ANTIGEN: NORMAL
CALCIUM SERPL-MCNC: 9 MG/DL (ref 8.3–10.6)
CHLORIDE BLD-SCNC: 98 MMOL/L (ref 99–110)
CO2: 20 MMOL/L (ref 21–32)
CREAT SERPL-MCNC: 0.9 MG/DL (ref 0.6–1.2)
GAMMA GLOBULIN: 0.5 G/DL (ref 0.6–1.8)
GFR SERPL CREATININE-BSD FRML MDRD: >60 ML/MIN/{1.73_M2}
GI BACTERIAL PATHOGENS BY PCR: NORMAL
GLUCOSE BLD-MCNC: 96 MG/DL (ref 70–99)
HAV IGM SER IA-ACNC: NORMAL
HAV IGM SER IA-ACNC: NORMAL
HEPATITIS B CORE IGM ANTIBODY: NORMAL
HEPATITIS B CORE IGM ANTIBODY: NORMAL
HEPATITIS B SURFACE ANTIGEN INTERPRETATION: NORMAL
HEPATITIS B SURFACE ANTIGEN INTERPRETATION: NORMAL
HEPATITIS C ANTIBODY INTERPRETATION: NORMAL
HEPATITIS C ANTIBODY INTERPRETATION: NORMAL
POTASSIUM SERPL-SCNC: 4.2 MMOL/L (ref 3.5–5.1)
SODIUM BLD-SCNC: 132 MMOL/L (ref 136–145)
SPE/IFE INTERPRETATION: NORMAL
TOTAL PROTEIN: 6 G/DL (ref 6.4–8.2)
TOTAL PROTEIN: 6.5 G/DL (ref 6.4–8.2)
TSH SERPL DL<=0.05 MIU/L-ACNC: 1.92 UIU/ML (ref 0.27–4.2)
URINE CULTURE, ROUTINE: NORMAL

## 2022-10-20 PROCEDURE — 6360000002 HC RX W HCPCS: Performed by: NURSE PRACTITIONER

## 2022-10-20 PROCEDURE — 6360000002 HC RX W HCPCS: Performed by: INTERNAL MEDICINE

## 2022-10-20 PROCEDURE — 1200000000 HC SEMI PRIVATE

## 2022-10-20 PROCEDURE — 6370000000 HC RX 637 (ALT 250 FOR IP): Performed by: INTERNAL MEDICINE

## 2022-10-20 PROCEDURE — 2580000003 HC RX 258: Performed by: INTERNAL MEDICINE

## 2022-10-20 PROCEDURE — 80074 ACUTE HEPATITIS PANEL: CPT

## 2022-10-20 PROCEDURE — 87449 NOS EACH ORGANISM AG IA: CPT

## 2022-10-20 PROCEDURE — 87324 CLOSTRIDIUM AG IA: CPT

## 2022-10-20 PROCEDURE — 80053 COMPREHEN METABOLIC PANEL: CPT

## 2022-10-20 PROCEDURE — 82705 FATS/LIPIDS FECES QUAL: CPT

## 2022-10-20 PROCEDURE — 86015 ACTIN ANTIBODY EACH: CPT

## 2022-10-20 PROCEDURE — 36415 COLL VENOUS BLD VENIPUNCTURE: CPT

## 2022-10-20 PROCEDURE — 87506 IADNA-DNA/RNA PROBE TQ 6-11: CPT

## 2022-10-20 RX ORDER — LORAZEPAM 0.5 MG/1
0.5 TABLET ORAL 2 TIMES DAILY PRN
Status: DISCONTINUED | OUTPATIENT
Start: 2022-10-20 | End: 2022-10-22 | Stop reason: HOSPADM

## 2022-10-20 RX ORDER — PANTOPRAZOLE SODIUM 40 MG/1
40 TABLET, DELAYED RELEASE ORAL
Status: DISCONTINUED | OUTPATIENT
Start: 2022-10-20 | End: 2022-10-22 | Stop reason: HOSPADM

## 2022-10-20 RX ORDER — MORPHINE SULFATE 2 MG/ML
2 INJECTION, SOLUTION INTRAMUSCULAR; INTRAVENOUS ONCE
Status: COMPLETED | OUTPATIENT
Start: 2022-10-20 | End: 2022-10-20

## 2022-10-20 RX ORDER — PANTOPRAZOLE SODIUM 40 MG/1
40 TABLET, DELAYED RELEASE ORAL
Status: DISCONTINUED | OUTPATIENT
Start: 2022-10-20 | End: 2022-10-20

## 2022-10-20 RX ORDER — MEMANTINE HYDROCHLORIDE 5 MG/1
10 TABLET ORAL 2 TIMES DAILY
Status: DISCONTINUED | OUTPATIENT
Start: 2022-10-20 | End: 2022-10-22 | Stop reason: HOSPADM

## 2022-10-20 RX ADMIN — SODIUM CHLORIDE, POTASSIUM CHLORIDE, SODIUM LACTATE AND CALCIUM CHLORIDE: 600; 310; 30; 20 INJECTION, SOLUTION INTRAVENOUS at 14:52

## 2022-10-20 RX ADMIN — SUCRALFATE 1 G: 1 TABLET ORAL at 05:21

## 2022-10-20 RX ADMIN — MORPHINE SULFATE 2 MG: 2 INJECTION, SOLUTION INTRAMUSCULAR; INTRAVENOUS at 02:22

## 2022-10-20 RX ADMIN — SUCRALFATE 1 G: 1 TABLET ORAL at 17:35

## 2022-10-20 RX ADMIN — ONDANSETRON 4 MG: 2 INJECTION INTRAMUSCULAR; INTRAVENOUS at 02:22

## 2022-10-20 RX ADMIN — ATENOLOL 100 MG: 25 TABLET ORAL at 20:10

## 2022-10-20 RX ADMIN — ROSUVASTATIN CALCIUM 40 MG: 10 TABLET, FILM COATED ORAL at 20:10

## 2022-10-20 RX ADMIN — ATENOLOL 100 MG: 25 TABLET ORAL at 10:39

## 2022-10-20 RX ADMIN — PANTOPRAZOLE SODIUM 40 MG: 40 TABLET, DELAYED RELEASE ORAL at 05:21

## 2022-10-20 RX ADMIN — MEMANTINE 10 MG: 5 TABLET ORAL at 20:10

## 2022-10-20 RX ADMIN — ACETAMINOPHEN 650 MG: 325 TABLET ORAL at 06:48

## 2022-10-20 RX ADMIN — SUCRALFATE 1 G: 1 TABLET ORAL at 11:16

## 2022-10-20 RX ADMIN — LOPERAMIDE HYDROCHLORIDE 2 MG: 2 CAPSULE ORAL at 13:06

## 2022-10-20 RX ADMIN — ONDANSETRON 4 MG: 2 INJECTION INTRAMUSCULAR; INTRAVENOUS at 06:48

## 2022-10-20 RX ADMIN — ENOXAPARIN SODIUM 40 MG: 100 INJECTION SUBCUTANEOUS at 10:39

## 2022-10-20 RX ADMIN — DULOXETINE HYDROCHLORIDE 120 MG: 60 CAPSULE, DELAYED RELEASE ORAL at 10:39

## 2022-10-20 RX ADMIN — LEVOTHYROXINE SODIUM 125 MCG: 0.12 TABLET ORAL at 05:21

## 2022-10-20 RX ADMIN — MEMANTINE 10 MG: 5 TABLET ORAL at 10:39

## 2022-10-20 RX ADMIN — PANTOPRAZOLE SODIUM 40 MG: 40 TABLET, DELAYED RELEASE ORAL at 17:34

## 2022-10-20 RX ADMIN — LORAZEPAM 0.5 MG: 0.5 TABLET ORAL at 20:10

## 2022-10-20 ASSESSMENT — PAIN SCALES - GENERAL
PAINLEVEL_OUTOF10: 8
PAINLEVEL_OUTOF10: 0
PAINLEVEL_OUTOF10: 4
PAINLEVEL_OUTOF10: 0
PAINLEVEL_OUTOF10: 0
PAINLEVEL_OUTOF10: 1
PAINLEVEL_OUTOF10: 0

## 2022-10-20 ASSESSMENT — PAIN DESCRIPTION - LOCATION
LOCATION: ABDOMEN
LOCATION: HEAD

## 2022-10-20 NOTE — CONSULTS
Gastroenterology Consult Note    Patient:   Peterson Mcbride   YOB: 1960   Facility:   [facility]   Referring/PCP: Benito Dickens MD  Date:     10/20/2022  Consultant:   Christin Mcguire MD, MD    Subjective: This 60 y.o. female was admitted 10/19/2022 with a diagnosis of \"Carcinoid syndrome ([address] [E34.0]  Hyponatremia [E87.1]  Generalized abdominal pain [R10.84]  Elevated LFTs [R79.89]\" and is seen in consultation regarding \"abd pain\". Information was obtained from interview of  the patient, examination of the patient, and review of records. I did  update the past medical, surgical, social and / or family history. Abd pain moderate for months in upper abd assoc w diarrhea    Current status  Present  Diet Order: ADULT DIET; Regular; Low Fat/Low Chol/High Fiber/JOCELYN and she is tolerating diet. Recently, she has experienced moderate, maximum 4/10 epigastric abdominal  Pain and she has not required Intravenous narcotic analgesics. The patient has also experienced no constipation, fever, hematochezia, and melena      Prior to Admission medications    Medication Sig Start Date End Date Taking?  Authorizing Provider   levothyroxine (SYNTHROID) 125 MCG tablet TAKE 1 TABLET BY MOUTH EVERY DAY 12/10/20   Rosendo Blackburn MD   atenolol (TENORMIN) 25 MG tablet Take 50 mg by mouth daily 50mgs in morning and 25mgs in evening    Historical Provider, MD   pantoprazole sodium (PROTONIX) 40 MG PACK packet Take 40 mg by mouth every morning (before breakfast)    Historical Provider, MD   rosuvastatin (CRESTOR) 40 MG tablet Take 40 mg by mouth every evening    Historical Provider, MD   Ubrogepant 50 MG TABS Take 50 mg by mouth    Historical Provider, MD   Ubrogepant 100 MG TABS Take 100 mg by mouth as needed (up to one dose)    Historical Provider, MD   ondansetron (ZOFRAN) 4 MG tablet Take 1 tablet by mouth daily as needed for Nausea or Vomiting 5/11/19   Matthew Bullard DO clopidogrel (PLAVIX) 75 MG tablet Take 75 mg by mouth daily    Historical Provider, MD   orphenadrine (NORFLEX) 100 MG extended release tablet Take 100 mg by mouth 2 times daily as needed     Historical Provider, MD   promethazine (PHENERGAN) 25 MG tablet Take 25 mg by mouth every 6 hours as needed for Nausea    Historical Provider, MD   diclofenac sodium (VOLTAREN) 1 % GEL Apply 4 g topically 3 times daily as needed for Pain Apply to LEFT knee 8/6/18 10/19/18  DESIREE Farley   lamoTRIgine (LAMICTAL) 150 MG tablet Take 150 mg by mouth daily    Historical Provider, MD   DULoxetine (CYMBALTA) 60 MG capsule Take 2 capsules by mouth daily 6/28/16   Farrukh Matos MD   Cholecalciferol (VITAMIN D) 2000 UNITS CAPS capsule Take 1 capsule by mouth daily    Historical Provider, MD   clonazePAM (KLONOPIN) 0.5 MG tablet Take 1 tablet by mouth 2 times daily as needed 5/18/16   Farrukh Matos MD      Scheduled Medications:    memantine  10 mg Oral BID    pantoprazole  40 mg Oral BID AC    sucralfate  1 g Oral 4 times per day    DULoxetine  120 mg Oral Daily    lamoTRIgine  150 mg Oral Daily    levothyroxine  125 mcg Oral Daily    rosuvastatin  40 mg Oral Nightly    enoxaparin  40 mg SubCUTAneous Daily    atenolol  100 mg Oral BID     Infusions:    lactated ringers 125 mL/hr at 10/19/22 2150    sodium chloride       PRN Medications: ondansetron, ondansetron, promethazine, promethazine, loperamide, clonazePAM, sodium chloride flush, sodium chloride, potassium chloride **OR** potassium alternative oral replacement **OR** potassium chloride, magnesium sulfate, acetaminophen **OR** acetaminophen  Allergies:    Allergies   Allergen Reactions    Latex Other (See Comments)     Turns skin really red with use of latex gloves    Codeine Hives     Pt can take Norco with Benadryl    Ketorolac     Ketorolac Tromethamine     Kiwi Extract Itching    Nsaids Other (See Comments)     \"tear up my stomach\"    Penicillins Hives Tolmetin        Past Medical History:   Diagnosis Date    Anticoagulant long-term use     Anxiety     Arthritis     Carcinoid syndrome ([address] 10/19/2022    Cerebrovascular disease 2014    stroke    Depression     Endometriosis     Fibromyalgia     Generalized osteoarthritis 3/6/2015    GERD (gastroesophageal reflux disease)     H/O suicide attempt     Headache(784.0)     Hyperlipidemia     Hypertension     Hypothyroidism     Irritable bowel syndrome     Migraines     Obesity     Patent foramen ovale 2014    PONV (postoperative nausea and vomiting)      Past Surgical History:   Procedure Laterality Date    CHOLECYSTECTOMY      laparoscopic    COLONOSCOPY      COLONOSCOPY  2017    random biopsies    COLONOSCOPY  2017    polyp    HYSTERECTOMY (CERVIX STATUS UNKNOWN)      total vaginal hysterectomy, retains both ovaries, menorrhagia, fibroid tumors    JOINT REPLACEMENT Left 2018    LTK     KNEE CARTILAGE SURGERY Right     KNEE SURGERY      THYROIDECTOMY         Social:   Social History     Tobacco Use    Smoking status: Never    Smokeless tobacco: Never   Substance Use Topics    Alcohol use: Yes     Alcohol/week: 2.0 standard drinks     Types: 2 Glasses of wine per week     Comment: socially     Family:   Family History   Problem Relation Age of Onset    Clotting Disorder Mother     Other Mother         lung disease    Cancer Father         colon, brain    Arthritis Father         polio    Heart Disease Father     Heart Disease Brother     Obesity Brother     Substance Abuse Brother        ROS: Pertinent items are noted in HPI.     Objective:   Vital Signs:  Temp (24hrs), Av.9 °F (36.6 °C), Min:97.3 °F (36.3 °C), Max:98.3 °F (52.4 °C)     Systolic (87DBJ), TAD:071 , Min:92 , ALA:311      Diastolic (20RSB), AWB:79, Min:60, Max:74     Pulse  Av.1  Min: 66  Max: 93  BP 97/64   Pulse 72   Temp 97.3 °F (36.3 °C) (Oral)   Resp 18   Ht 5' 1\" (1.549 m)   Wt 153 lb (69.4 kg) SpO2 96%   BMI 28.91 kg/m²      Physical Exam:   BP 97/64   Pulse 72   Temp 97.3 °F (36.3 °C) (Oral)   Resp 18   Ht 5' 1\" (1.549 m)   Wt 153 lb (69.4 kg)   SpO2 96%   BMI 28.91 kg/m²   General appearance: alert, appears stated age, and cooperative  Lungs: clear to auscultation bilaterally  Chest wall: no tenderness  Heart: regular rate and rhythm, S1, S2 normal, no murmur, click, rub or gallop  Abdomen: abnormal findings:  tenderness moderate in the upper abdomen  Extremities: extremities normal, atraumatic, no cyanosis or edema  Skin: Skin color, texture, turgor normal. No rashes or lesions  Neurologic: Grossly normal    Lab and Imaging Review   Recent Labs     10/19/22  1400 10/19/22  1907   WBC 7.2  --    HGB 15.0  --    MCV 94.1  --      --    INR  --  0.95   *  --    K 4.3  --    CL 92*  --    CO2 23  --    BUN 20  --    CREATININE 1.2  --    GLUCOSE 111*  --    CALCIUM 9.9  --    PROT 7.3  --    LABALBU 4.9  --    *  --    *  --    ALKPHOS 289*  --    BILITOT 1.0  --    LIPASE 150.0*  --          Assessment:     Patient Active Problem List    Diagnosis Date Noted    [facility] 10/19/2022    Nausea vomiting and diarrhea 10/19/2022    Variants of migraine     HTN (hypertension)     History of CVA in adulthood 01/17/2021    Dizziness 01/17/2021    Obesity (BMI 30-39.9) 10/11/2020    Status post total left knee replacement 06/28/2018    S/P TKR (total knee replacement) using cement, left 06/28/2018    Primary osteoarthritis of both knees 03/19/2018    Dyspareunia in female 08/06/2016    Chronic pain of right knee 07/22/2016    Chronic pain of left knee 07/22/2016    Right knee pain 04/05/2016    Left knee pain 04/05/2016    Chondromalacia patellae of right knee 10/23/2015    Primary osteoarthritis of right knee 10/23/2015    Tear of medial meniscus of right knee 10/23/2015    Knee effusion 10/23/2015    Decreased libido 10/09/2015    S/P right knee arthroscopy, in 2012 by Dr. 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    Hyperlipidemia     Cerebral artery occlusion with cerebral infarction ([address]     GERD (gastroesophageal reflux disease)     Hypothyroidism     Anxiety     Depression     H/O suicide attempt     Headache, chronic daily 11/14/2014    Cerebral infarction ([address] 02/27/2014    Patent foramen ovale 02/27/2014    Disc disorder of cervical region 04/16/2012    Fibromyalgia 04/16/2012    Irritable bowel syndrome 04/16/2012     61 yo w GERD, HTN, CVA, Carcinoid sd (elevated Chromogranin A), anxiety/depression who has been having chronic intermittent diarrhea and diffuse upper abd burning w pp epig \"knot\" feeling w N/V, refractory to Protonix and Pepcid. Had an outpatient EGD on Monday w gastritis. Had a colonoscopy in 2021. She also has elevated liver enzymes AST//190, , coni 1 w neg. CT. Is s/p CCY 10 years ago.     Plan:   Supportive care  Will increase Protonix to bid  Consider Bentyl vs Colestipol  Agree w Carafate   Will await gastric Bx  Will order Hepatitis panel and FIDE/F-actin  Consider MRI of the abdomen (especially w the new Carcinoid sd Dx)  Will follow    Denis Gonzales MD       [phone]

## 2022-10-20 NOTE — CONSULTS
Gastroenterology Consult Note    Patient:   Wale Rodriguez   YOB: 1960   Facility:   [facility]   Referring/PCP: Cathy Soriano MD  Date:     10/20/2022  Consultant:   Bree Caraballo MD, MD    Subjective: This 60 y.o. female was admitted 10/19/2022 with a diagnosis of \"Carcinoid syndrome ([address] [E34.0]  Hyponatremia [E87.1]  Generalized abdominal pain [R10.84]  Elevated LFTs [R79.89]\" and is seen in consultation regarding \"abd pain\". Information was obtained from interview of  the patient, examination of the patient, and review of records. I did  update the past medical, surgical, social and / or family history. Abd pain moderate for months in upper abd assoc w diarrhea    Current status  Present  Diet Order: ADULT DIET; Regular; Low Fat/Low Chol/High Fiber/JOCELYN and she is tolerating diet. Recently, she has experienced moderate, maximum 4/10 epigastric abdominal  Pain and she has not required Intravenous narcotic analgesics. The patient has also experienced no constipation, fever, hematochezia, and melena      Prior to Admission medications    Medication Sig Start Date End Date Taking?  Authorizing Provider   levothyroxine (SYNTHROID) 125 MCG tablet TAKE 1 TABLET BY MOUTH EVERY DAY 12/10/20   Dwight Keller MD   atenolol (TENORMIN) 25 MG tablet Take 50 mg by mouth daily 50mgs in morning and 25mgs in evening    Historical Provider, MD   pantoprazole sodium (PROTONIX) 40 MG PACK packet Take 40 mg by mouth every morning (before breakfast)    Historical Provider, MD   rosuvastatin (CRESTOR) 40 MG tablet Take 40 mg by mouth every evening    Historical Provider, MD   Ubrogepant 50 MG TABS Take 50 mg by mouth    Historical Provider, MD   Ubrogepant 100 MG TABS Take 100 mg by mouth as needed (up to one dose)    Historical Provider, MD   ondansetron (ZOFRAN) 4 MG tablet Take 1 tablet by mouth daily as needed for Nausea or Vomiting 5/11/19   India Saravia DO clopidogrel (PLAVIX) 75 MG tablet Take 75 mg by mouth daily    Historical Provider, MD   orphenadrine (NORFLEX) 100 MG extended release tablet Take 100 mg by mouth 2 times daily as needed     Historical Provider, MD   promethazine (PHENERGAN) 25 MG tablet Take 25 mg by mouth every 6 hours as needed for Nausea    Historical Provider, MD   diclofenac sodium (VOLTAREN) 1 % GEL Apply 4 g topically 3 times daily as needed for Pain Apply to LEFT knee 8/6/18 10/19/18  DESIREE Farley   lamoTRIgine (LAMICTAL) 150 MG tablet Take 150 mg by mouth daily    Historical Provider, MD   DULoxetine (CYMBALTA) 60 MG capsule Take 2 capsules by mouth daily 6/28/16   Ronald López MD   Cholecalciferol (VITAMIN D) 2000 UNITS CAPS capsule Take 1 capsule by mouth daily    Historical Provider, MD   clonazePAM (KLONOPIN) 0.5 MG tablet Take 1 tablet by mouth 2 times daily as needed 5/18/16   Ronald López MD      Scheduled Medications:    pantoprazole  40 mg Oral QAM AC    memantine  10 mg Oral BID    sucralfate  1 g Oral 4 times per day    DULoxetine  120 mg Oral Daily    lamoTRIgine  150 mg Oral Daily    levothyroxine  125 mcg Oral Daily    rosuvastatin  40 mg Oral Nightly    enoxaparin  40 mg SubCUTAneous Daily    atenolol  100 mg Oral BID     Infusions:    lactated ringers 125 mL/hr at 10/19/22 2150    sodium chloride       PRN Medications: ondansetron, ondansetron, promethazine, promethazine, loperamide, clonazePAM, sodium chloride flush, sodium chloride, potassium chloride **OR** potassium alternative oral replacement **OR** potassium chloride, magnesium sulfate, acetaminophen **OR** acetaminophen  Allergies:    Allergies   Allergen Reactions    Latex Other (See Comments)     Turns skin really red with use of latex gloves    Codeine Hives     Pt can take Norco with Benadryl    Ketorolac     Ketorolac Tromethamine     Kiwi Extract Itching    Nsaids Other (See Comments)     \"tear up my stomach\"    Penicillins Hives Tolmetin        Past Medical History:   Diagnosis Date    Anticoagulant long-term use     Anxiety     Arthritis     Carcinoid syndrome ([address] 10/19/2022    Cerebrovascular disease 2014    stroke    Depression     Endometriosis     Fibromyalgia     Generalized osteoarthritis 3/6/2015    GERD (gastroesophageal reflux disease)     H/O suicide attempt     Headache(784.0)     Hyperlipidemia     Hypertension     Hypothyroidism     Irritable bowel syndrome     Migraines     Obesity     Patent foramen ovale 2014    PONV (postoperative nausea and vomiting)      Past Surgical History:   Procedure Laterality Date    CHOLECYSTECTOMY      laparoscopic    COLONOSCOPY      COLONOSCOPY  2017    random biopsies    COLONOSCOPY  2017    polyp    HYSTERECTOMY (CERVIX STATUS UNKNOWN)      total vaginal hysterectomy, retains both ovaries, menorrhagia, fibroid tumors    JOINT REPLACEMENT Left 2018    LTK     KNEE CARTILAGE SURGERY Right     KNEE SURGERY      THYROIDECTOMY         Social:   Social History     Tobacco Use    Smoking status: Never    Smokeless tobacco: Never   Substance Use Topics    Alcohol use: Yes     Alcohol/week: 2.0 standard drinks     Types: 2 Glasses of wine per week     Comment: socially     Family:   Family History   Problem Relation Age of Onset    Clotting Disorder Mother     Other Mother         lung disease    Cancer Father         colon, brain    Arthritis Father         polio    Heart Disease Father     Heart Disease Brother     Obesity Brother     Substance Abuse Brother        ROS: Pertinent items are noted in HPI.     Objective:   Vital Signs:  Temp (24hrs), Av.9 °F (36.6 °C), Min:97.3 °F (36.3 °C), Max:98.3 °F (98.4 °C)     Systolic (10FHK), DAIN:317 , Min:92 , TFC:003      Diastolic (11[facility]), NPF:13, Min:60, Max:74     Pulse  Av.1  Min: 66  Max: 93  BP 97/64   Pulse 72   Temp 97.3 °F (36.3 °C) (Oral)   Resp 18   Ht 5' 1\" (1.549 m)   Wt 153 lb (69.4 kg) SpO2 96%   BMI 28.91 kg/m²      Physical Exam:   BP 97/64   Pulse 72   Temp 97.3 °F (36.3 °C) (Oral)   Resp 18   Ht 5' 1\" (1.549 m)   Wt 153 lb (69.4 kg)   SpO2 96%   BMI 28.91 kg/m²   General appearance: alert, appears stated age, and cooperative  Lungs: clear to auscultation bilaterally  Chest wall: no tenderness  Heart: regular rate and rhythm, S1, S2 normal, no murmur, click, rub or gallop  Abdomen: abnormal findings:  tenderness moderate in the upper abdomen  Extremities: extremities normal, atraumatic, no cyanosis or edema  Skin: Skin color, texture, turgor normal. No rashes or lesions  Neurologic: Grossly normal    Lab and Imaging Review   Recent Labs     10/19/22  1400 10/19/22  1907   WBC 7.2  --    HGB 15.0  --    MCV 94.1  --      --    INR  --  0.95   *  --    K 4.3  --    CL 92*  --    CO2 23  --    BUN 20  --    CREATININE 1.2  --    GLUCOSE 111*  --    CALCIUM 9.9  --    PROT 7.3  --    LABALBU 4.9  --    *  --    *  --    ALKPHOS 289*  --    BILITOT 1.0  --    LIPASE 150.0*  --        Assessment:     Patient Active Problem List    Diagnosis Date Noted    Carcinoid syndrome ([facility] 10/19/2022    Nausea vomiting and diarrhea 10/19/2022    Variants of migraine     HTN (hypertension)     History of CVA in adulthood 01/17/2021    Dizziness 01/17/2021    Obesity (BMI 30-39.9) 10/11/2020    Status post total left knee replacement 06/28/2018    S/P TKR (total knee replacement) using cement, left 06/28/2018    Primary osteoarthritis of both knees 03/19/2018    Dyspareunia in female 08/06/2016    Chronic pain of right knee 07/22/2016    Chronic pain of left knee 07/22/2016    Right knee pain 04/05/2016    Left knee pain 04/05/2016    Chondromalacia patellae of right knee 10/23/2015    Primary osteoarthritis of right knee 10/23/2015    Tear of medial meniscus of right knee 10/23/2015    Knee effusion 10/23/2015    Decreased libido 10/09/2015    S/P right knee arthroscopy, in 2012 by Dr. Ifrah Francis 08/14/2015    Primary osteoarthritis of left knee 08/14/2015    Chondromalacia patellae of left knee 08/14/2015    Menopause 03/07/2015    History of total vaginal hysterectomy 03/07/2015    Vaginal atrophy 03/07/2015    Generalized osteoarthritis 03/06/2015    Impingement syndrome of shoulder region 03/06/2015    Spondyloarthropathy 03/06/2015    Hyperlipidemia     Cerebral artery occlusion with cerebral infarction ([facility]     GERD (gastroesophageal reflux disease)     Hypothyroidism     Anxiety     Depression     H/O suicide attempt     Headache, chronic daily 11/14/2014    Cerebral infarction ([address] 02/27/2014    Patent foramen ovale 02/27/2014    Disc disorder of cervical region 04/16/2012    Fibromyalgia 04/16/2012    Irritable bowel syndrome 04/16/2012     61 yo w GERD, HTN, CVA, Carcinoid sd, anxiety/depression who has been having chronic intermittent diarrhea and diffuse upper abd burning w pp epig \"knot\" feeling w N/V, refractory to Protonix and Pepcid. Had an outpatient EGD on Monday w gastritis. Had a colonoscopy in 2021. She also has elevated liver enzymes AST//190, , coni 1 w neg. CT. Is s/p CCY 10 years ago.     Plan:   Supportive care  Will increase Protonix to bid  Consider Bentyl vs Colestipol  Agree w Carafate   Will await gastric Bx  Will order Hepatitis panel and FIDE/F-actin  Will follow    Werner Ramirez MD       ([phone]) [phone]

## 2022-10-20 NOTE — PROGRESS NOTES
Pt had one episode of bright red blood from rectum. Dr. Kasie Urrutia informed and waiting for orders. Dayan Fields RN

## 2022-10-20 NOTE — PROGRESS NOTES
epig>left upper quadrant>right upper quadrant  MSK no cyanosis, no clubbing  SKIN warm, dry  NEURO alert, oriented x 3, no facial asymmetry, no dysarthria, moving spontaneously  PSYCH normal mood        Labs:   Recent Labs     10/19/22  1400   WBC 7.2   HGB 15.0   HCT 45.5        Recent Labs     10/19/22  1400 10/20/22  0909   * 132*   K 4.3 4.2   CL 92* 98*   CO2 23 20*   BUN 20 17   CREATININE 1.2 0.9   CALCIUM 9.9 9.0     Recent Labs     10/19/22  1400 10/20/22  0909   * 117*   * 114*   BILITOT 1.0 0.3   ALKPHOS 289* 218*     Recent Labs     10/19/22  1907   INR 0.95     No results for input(s): CKTOTAL, TROPONINI in the last 72 hours. Urinalysis:      Lab Results   Component Value Date/Time    NITRU Negative 10/19/2022 01:59 PM    WBCUA  10/19/2022 01:59 PM    BACTERIA 4+ 10/19/2022 01:59 PM    RBCUA 11-20 10/19/2022 01:59 PM    BLOODU MODERATE 10/19/2022 01:59 PM    SPECGRAV >=1.030 10/19/2022 01:59 PM    GLUCOSEU Negative 10/19/2022 01:59 PM       Radiology:  CT ABDOMEN PELVIS W IV CONTRAST Additional Contrast? None   Final Result   1. No CT evidence of an acute intra-abdominal or intrapelvic process. 2. Mild diverticulosis coli without CT evidence of acute diverticulitis. 3.  Mild intra and extrahepatic bile duct dilatation status post   cholecystectomy typical of reservoir effect. 4.  No findings to suggest acute appendicitis; no ureter calculus or   hydronephrosis. MRI ABDOMEN W WO CONTRAST    (Results Pending)           Assessment/Plan:    Active Hospital Problems    Diagnosis Date Noted    Carcinoid syndrome ([facility] [E34.0] 10/19/2022     Priority: Medium    Nausea vomiting and diarrhea [R11.2, R19.7] 10/19/2022     Priority: Medium    HTN (hypertension) [I10]     History of CVA in adulthood [Z86.73] 01/17/2021    GERD (gastroesophageal reflux disease) [K21.9]     Hypothyroidism [E03.9]     Irritable bowel syndrome [K58.9] 04/16/2012     This is a 60 y.o. female, who presented to the ER for abdominal discomfort, nausea, vomiting, and diarrhea. She has a h/o IBS so is accustomed to episodic diarrhea. However, over the summer she developed non-episodic diarrhea. She has had perrsistent and worsening abdominal cramping and diarrhea all summer. She has also been having episodic \"hot flashes\" where she feels flushed and hot. This is also often accompanied by generalized tingling sensations. There are times when she also becomes nauseated. Patient's PCP has been working her up and has found strong evidence that the patient's symptoms are a carcinoid syndrome. She has had very elevated chromogranin A levels since at least August, 2022. She was referred to GI, Dr. Griselda Buss, who has continued the work-up and recommended she be hospitalized today. Patient has had a whole-body PET/CT on 10/3 or 10/4  and an EGD with tissue biopsy on 10/17. Neither successfully located a tumor. Daily Flushing, Worsening Diarrhea, Worsening Abdominal Pain  New Rectal Bleeding since 10/18  Carcinoid syndrome vs IBD in the setting of menopause  - reportedly elevated chromogramin A levels in August.  - Await gastric tissue biopsy.  - MRI abdomen/pelvis with contrast.  - Informed GI of observed rectal bleeding during rounds. May also need colonoscopy to rule out IBD.  - Supportive care - increased protonix to 40 mg BID.  - IVF's. History of CVA  - residual memory issues  - Home on plavix 75 mg daily, crestor 40 mg HS.  - Continue crestor.  - Hold plavix. HLD  - Continue home crestor 40 mg HS. HTN  - Continue home atenolol 25 mg daily. Hypothyroidism  - TSH 1.92 on 10/19/22.  - Continue home levothyroxine 125 mcg daily. Depression/Anxiety  - Continue home cymbalta 60 mg daily, klonopin 0.5 mg BID, and lamictal 150 mg daily (? For mood). DVT Prophylaxis:   Diet: ADULT DIET;  Regular; Low Fat/Low Chol/High Fiber/JOCELYN  Code Status: Full Code    PT/OT Eval Status: Susanne Romberg, MD

## 2022-10-20 NOTE — CARE COORDINATION
CASE MANAGEMENT INITIAL ASSESSMENT      Reviewed chart and completed assessment with patient:bedside   Family present:  Donna Taylor  Explained Case Management role/services. Primary contact information:Counts include 234 beds at the [facility] Decision Maker :   Primary Decision Maker: Panda Dominguez - [phone]    Secondary Decision Maker: Linus Mondragon - Child - [phone]          Can this person be reached and be able to respond quickly, such as within a few minutes or hours? Yes      Admit date/status:10/19/22  Diagnosis:carcinoid syndrome   Is this a Readmission?:  No      Insurance:BCBS   Precert required for SNF: Yes       3 night stay required: No    Living arrangements, Adls, care needs, prior to admission:lives in ranch style homewith . 2STE IPTA    Durable Medical Equipment at home:  Walker__Cane__RTS__ BSC__Shower Chair__  02__ HHN__ CPAP__  BiPap__  Hospital Bed__ W/C___ Other_____    Services in the home and/or outpatient, prior to admission:none    Current PCP:Eleazar                                Medications Prescription coverage? yes    Transportation needs: none     PT/OT recs:none    Hospital Exemption Notification (HEN):needed for SNf    Barriers to discharge:none    Plan/comments:spoke with patient. Plans on returning home at discharge. Reported IPTA and denied any DCP needs. D/c pending workup.  Anila Hale RN       ECOC on chart for MD signature

## 2022-10-21 ENCOUNTER — APPOINTMENT (OUTPATIENT)
Dept: MRI IMAGING | Age: 62
DRG: 392 | End: 2022-10-21
Payer: MEDICARE

## 2022-10-21 LAB
ALBUMIN SERPL-MCNC: 3.7 G/DL (ref 3.4–5)
ALP BLD-CCNC: 157 U/L (ref 40–129)
ALT SERPL-CCNC: 67 U/L (ref 10–40)
ANTI-NUCLEAR ANTIBODY (ANA): NEGATIVE
AST SERPL-CCNC: 53 U/L (ref 15–37)
BILIRUB SERPL-MCNC: 0.3 MG/DL (ref 0–1)
BILIRUBIN DIRECT: <0.2 MG/DL (ref 0–0.3)
BILIRUBIN, INDIRECT: ABNORMAL MG/DL (ref 0–1)
FECAL NEUTRAL FAT: NORMAL
FECAL SPLIT FATS: NORMAL
HCT VFR BLD CALC: 39.8 % (ref 36–48)
HEMOGLOBIN: 12.9 G/DL (ref 12–16)
MCH RBC QN AUTO: 30.9 PG (ref 26–34)
MCHC RBC AUTO-ENTMCNC: 32.5 G/DL (ref 31–36)
MCV RBC AUTO: 95.2 FL (ref 80–100)
PDW BLD-RTO: 14.3 % (ref 12.4–15.4)
PLATELET # BLD: 152 K/UL (ref 135–450)
PMV BLD AUTO: 6.9 FL (ref 5–10.5)
RBC # BLD: 4.17 M/UL (ref 4–5.2)
TOTAL PROTEIN: 5.4 G/DL (ref 6.4–8.2)
WBC # BLD: 4.4 K/UL (ref 4–11)

## 2022-10-21 PROCEDURE — A9579 GAD-BASE MR CONTRAST NOS,1ML: HCPCS | Performed by: INTERNAL MEDICINE

## 2022-10-21 PROCEDURE — 6370000000 HC RX 637 (ALT 250 FOR IP): Performed by: INTERNAL MEDICINE

## 2022-10-21 PROCEDURE — 6360000004 HC RX CONTRAST MEDICATION: Performed by: INTERNAL MEDICINE

## 2022-10-21 PROCEDURE — 2580000003 HC RX 258: Performed by: INTERNAL MEDICINE

## 2022-10-21 PROCEDURE — 6360000002 HC RX W HCPCS: Performed by: INTERNAL MEDICINE

## 2022-10-21 PROCEDURE — 36415 COLL VENOUS BLD VENIPUNCTURE: CPT

## 2022-10-21 PROCEDURE — 80076 HEPATIC FUNCTION PANEL: CPT

## 2022-10-21 PROCEDURE — 85027 COMPLETE CBC AUTOMATED: CPT

## 2022-10-21 PROCEDURE — 74183 MRI ABD W/O CNTR FLWD CNTR: CPT

## 2022-10-21 PROCEDURE — 1200000000 HC SEMI PRIVATE

## 2022-10-21 PROCEDURE — 6370000000 HC RX 637 (ALT 250 FOR IP): Performed by: NURSE PRACTITIONER

## 2022-10-21 RX ORDER — CHOLESTYRAMINE 4 G/9G
4 POWDER, FOR SUSPENSION ORAL 2 TIMES DAILY WITH MEALS
Status: DISCONTINUED | OUTPATIENT
Start: 2022-10-21 | End: 2022-10-21

## 2022-10-21 RX ORDER — CHOLESTYRAMINE 4 G/9G
2 POWDER, FOR SUSPENSION ORAL 2 TIMES DAILY WITH MEALS
Status: DISCONTINUED | OUTPATIENT
Start: 2022-10-21 | End: 2022-10-22 | Stop reason: HOSPADM

## 2022-10-21 RX ADMIN — SODIUM CHLORIDE, POTASSIUM CHLORIDE, SODIUM LACTATE AND CALCIUM CHLORIDE: 600; 310; 30; 20 INJECTION, SOLUTION INTRAVENOUS at 16:12

## 2022-10-21 RX ADMIN — DULOXETINE HYDROCHLORIDE 120 MG: 60 CAPSULE, DELAYED RELEASE ORAL at 09:50

## 2022-10-21 RX ADMIN — LORAZEPAM 0.5 MG: 0.5 TABLET ORAL at 09:50

## 2022-10-21 RX ADMIN — LORAZEPAM 0.5 MG: 0.5 TABLET ORAL at 22:01

## 2022-10-21 RX ADMIN — SUCRALFATE 1 G: 1 TABLET ORAL at 17:42

## 2022-10-21 RX ADMIN — GADOTERIDOL 14 ML: 279.3 INJECTION, SOLUTION INTRAVENOUS at 07:36

## 2022-10-21 RX ADMIN — SUCRALFATE 1 G: 1 TABLET ORAL at 00:22

## 2022-10-21 RX ADMIN — MEMANTINE 10 MG: 5 TABLET ORAL at 09:50

## 2022-10-21 RX ADMIN — Medication 10 ML: at 22:04

## 2022-10-21 RX ADMIN — SUCRALFATE 1 G: 1 TABLET ORAL at 22:01

## 2022-10-21 RX ADMIN — MEMANTINE 10 MG: 5 TABLET ORAL at 22:00

## 2022-10-21 RX ADMIN — ROSUVASTATIN CALCIUM 40 MG: 10 TABLET, FILM COATED ORAL at 22:01

## 2022-10-21 RX ADMIN — LEVOTHYROXINE SODIUM 125 MCG: 0.12 TABLET ORAL at 06:00

## 2022-10-21 RX ADMIN — ATENOLOL 100 MG: 25 TABLET ORAL at 09:50

## 2022-10-21 RX ADMIN — SUCRALFATE 1 G: 1 TABLET ORAL at 12:40

## 2022-10-21 RX ADMIN — SUCRALFATE 1 G: 1 TABLET ORAL at 06:00

## 2022-10-21 RX ADMIN — PANTOPRAZOLE SODIUM 40 MG: 40 TABLET, DELAYED RELEASE ORAL at 16:24

## 2022-10-21 RX ADMIN — PANTOPRAZOLE SODIUM 40 MG: 40 TABLET, DELAYED RELEASE ORAL at 06:00

## 2022-10-21 RX ADMIN — CHOLESTYRAMINE 2 G: 4 POWDER, FOR SUSPENSION ORAL at 16:23

## 2022-10-21 RX ADMIN — ENOXAPARIN SODIUM 40 MG: 100 INJECTION SUBCUTANEOUS at 09:49

## 2022-10-21 RX ADMIN — ATENOLOL 100 MG: 25 TABLET ORAL at 22:00

## 2022-10-21 ASSESSMENT — PAIN SCALES - GENERAL
PAINLEVEL_OUTOF10: 0

## 2022-10-21 NOTE — PROGRESS NOTES
Hospitalist Progress Note      PCP: Anitha Escalante MD    Date of Admission: 10/19/2022    Chief Complaint: Abdominal pain, diarrhea, flushing    Hospital Course: Admitted with above symptoms. There is a suspicion for carcinoid tumor with some abnormalities on the MRI of the abdomen. Being followed by GI. Subjective: No chest pain, no shortness of breath, no nausea, no vomiting. Complains of some occasional diarrhea with abdominal cramping. Noted past history of irritable bowel syndrome. Medications:  Reviewed    Infusion Medications    lactated ringers 125 mL/hr at 10/20/22 1452    sodium chloride       Scheduled Medications    lamoTRIgine  150 mg Oral Daily    memantine  10 mg Oral BID    pantoprazole  40 mg Oral BID AC    sucralfate  1 g Oral 4 times per day    DULoxetine  120 mg Oral Daily    levothyroxine  125 mcg Oral Daily    rosuvastatin  40 mg Oral Nightly    enoxaparin  40 mg SubCUTAneous Daily    atenolol  100 mg Oral BID     PRN Meds: LORazepam, ondansetron, ondansetron, promethazine, promethazine, loperamide, sodium chloride flush, sodium chloride, potassium chloride **OR** potassium alternative oral replacement **OR** potassium chloride, magnesium sulfate, acetaminophen **OR** acetaminophen      Intake/Output Summary (Last 24 hours) at 10/21/2022 1242  Last data filed at 10/20/2022 1453  Gross per 24 hour   Intake 2245 ml   Output --   Net 2245 ml       Physical Exam Performed:    /79   Pulse 79   Temp 98.1 °F (36.7 °C) (Oral)   Resp 18   Ht 5' 1\" (1.549 m)   Wt 164 lb 1.6 oz (74.4 kg)   SpO2 96%   BMI 31.01 kg/m²     General appearance: No apparent distress, appears stated age and cooperative. HEENT: Pupils equal, round, and reactive to light. Conjunctivae/corneas clear. Neck: Supple, with full range of motion. No jugular venous distention. Trachea midline. Respiratory:  Normal respiratory effort.  Clear to auscultation, bilaterally without Rales/Wheezes/Rhonchi. Cardiovascular: Regular rate and rhythm with normal S1/S2 without murmurs, rubs or gallops. Abdomen: Soft, mild generalized tenderness with no rebound tenderness, non-distended with normal bowel sounds. Musculoskeletal: No clubbing, cyanosis or edema bilaterally. Full range of motion without deformity. Skin: Skin color, texture, turgor normal.  No rashes or lesions. Neurologic:  Neurovascularly intact without any focal sensory/motor deficits. Cranial nerves: II-XII intact, grossly non-focal.  Psychiatric: Alert and oriented, thought content appropriate, normal insight  Capillary Refill: Brisk, 3 seconds, normal   Peripheral Pulses: +2 palpable, equal bilaterally       Labs:   Recent Labs     10/19/22  1400 10/21/22  0535   WBC 7.2 4.4   HGB 15.0 12.9   HCT 45.5 39.8    152     Recent Labs     10/19/22  1400 10/20/22  0909   * 132*   K 4.3 4.2   CL 92* 98*   CO2 23 20*   BUN 20 17   CREATININE 1.2 0.9   CALCIUM 9.9 9.0     Recent Labs     10/19/22  1400 10/20/22  0909 10/21/22  0535   * 117* 53*   * 114* 67*   BILIDIR  --   --  <0.2   BILITOT 1.0 0.3 0.3   ALKPHOS 289* 218* 157*     Recent Labs     10/19/22  1907   INR 0.95     No results for input(s): CKTOTAL, TROPONINI in the last 72 hours. Urinalysis:      Lab Results   Component Value Date/Time    NITRU Negative 10/19/2022 01:59 PM    WBCUA  10/19/2022 01:59 PM    BACTERIA 4+ 10/19/2022 01:59 PM    RBCUA 11-20 10/19/2022 01:59 PM    BLOODU MODERATE 10/19/2022 01:59 PM    SPECGRAV >=1.030 10/19/2022 01:59 PM    GLUCOSEU Negative 10/19/2022 01:59 PM       Radiology:  MRI ABDOMEN W WO CONTRAST   Preliminary Result   1. 1.5 cm segment VII hepatic hemangioma. There is an indeterminate 5 mm   focus of arterial enhancement in segment IVb seen only on the arterial phase   which could reflect a vascular shunt or a flash filling hemangioma.   However,   given history of carcinoid syndrome, a subtle metastasis cannot be entirely   excluded. Recommend a short-term follow-up MRI in 3 months. 2. Questionable wall thickening along the 2nd portion of the duodenum   adjacent to the common bile duct. Recommend correlation with endoscopy. 3. Status post cholecystectomy. CT ABDOMEN PELVIS W IV CONTRAST Additional Contrast? None   Final Result   1. No CT evidence of an acute intra-abdominal or intrapelvic process. 2. Mild diverticulosis coli without CT evidence of acute diverticulitis. 3.  Mild intra and extrahepatic bile duct dilatation status post   cholecystectomy typical of reservoir effect. 4.  No findings to suggest acute appendicitis; no ureter calculus or   hydronephrosis. Assessment/Plan:    Active Hospital Problems    Diagnosis     Carcinoid syndrome ([facility] [address] [E34.0]      Priority: Medium    Nausea vomiting and diarrhea [R11.2, R19.7]      Priority: Medium    HTN (hypertension) [I10]     History of CVA in adulthood [Z86.73]     GERD (gastroesophageal reflux disease) [K21.9]     Hypothyroidism [E03.9]     Irritable bowel syndrome [K58.9]      PLAN:    Suspected carcinoid syndrome with carcinoid tumor  Presents with flushing and diarrhea. Noted a carcinoid tumor diagnosis with no clear details. MRI of the abdomen shows some suspicious lesions although these can simply be hemangiomas. We will ask oncology to see for further insight. Hypothyroidism  Continue home dose of Synthroid as before with no changes. Overall stable. TSH was normal few days ago. Hypertension  Controlled blood pressure with no changes required in medical management. Dyslipidemia  Continue home Crestor 40 mg p.o. daily    Irritable bowel  Medical condition known from before. May explain abdominal complaints but would not explain systemic symptoms. Discussed with the patient. Questions answered. DVT Prophylaxis: Lovenox  Diet: ADULT DIET;  Regular  Code Status: Full Code  PT/OT Eval Status: Not needed    Dispo -inpatient stay pending GI and oncology recommendation    Appropriate for A1 Discharge Unit: Malena Morgan MD

## 2022-10-21 NOTE — CARE COORDINATION
Chart reviewed, LOS 2days. New Oncology consult, GI involved. No discharge needs noted at this time. CM will continue to follow pt's progress and coordinate discharge arrangements as needed.   RAYRAY Reyes-RN

## 2022-10-21 NOTE — ED PROVIDER NOTES
Emergency Department Attending Provider Note  Location: [facility]  10/19/2022     Chief Complaint   Patient presents with    Nausea     Patient states she had upper GI scope on Monday, states she has had burning sensation in upper abdomen this morning with nausea. GI doctor advised her to come to the ED for evaluation. PATIENT ID:  Dinah Finley is a 60 y.o. female    Past Medical History:   Diagnosis Date    Anticoagulant long-term use     Anxiety     Arthritis     Carcinoid syndrome ([address] 10/19/2022    Cerebrovascular disease 2/2014    stroke    Depression     Endometriosis     Fibromyalgia     Generalized osteoarthritis 3/6/2015    GERD (gastroesophageal reflux disease)     H/O suicide attempt     Headache(784.0)     Hyperlipidemia     Hypertension     Hypothyroidism     Irritable bowel syndrome     Migraines     Obesity     Patent foramen ovale 2/27/2014    PONV (postoperative nausea and vomiting)        Dinah Finley was evaluated in the Emergency Department for GERD, burning abdominal pain, starting after an EGD. Presenting the emergency department today recommendation of PCP. Vital signs are stable today in the emergency department, and while patient does appear uncomfortable, she is in no acute distress. .      Although initial history and physical exam information was obtained by DESIREE Tillman (who also dictated a record of this visit), I personally saw the patient and performed a substantive portion of the visit including all aspects of the medical decision making. CT ABDOMEN PELVIS W IV CONTRAST Additional Contrast? None   Final Result   1. No CT evidence of an acute intra-abdominal or intrapelvic process. 2. Mild diverticulosis coli without CT evidence of acute diverticulitis. 3.  Mild intra and extrahepatic bile duct dilatation status post   cholecystectomy typical of reservoir effect.    4.  No findings to suggest acute appendicitis; no ureter calculus or hydronephrosis. Labs Reviewed   URINALYSIS WITH REFLEX TO CULTURE - Abnormal; Notable for the following components:       Result Value    Color, UA DARK YELLOW (*)     Clarity, UA SL CLOUDY (*)     Bilirubin Urine MODERATE (*)     Ketones, Urine TRACE (*)     Blood, Urine MODERATE (*)     Protein, UA 30 (*)     Leukocyte Esterase, Urine MODERATE (*)     All other components within normal limits   COMPREHENSIVE METABOLIC PANEL - Abnormal; Notable for the following components:    Sodium 130 (*)     Chloride 92 (*)     Glucose 111 (*)     Est, Glom Filt Rate 51 (*)     Alkaline Phosphatase 289 (*)      (*)      (*)     All other components within normal limits   MICROSCOPIC URINALYSIS - Abnormal; Notable for the following components:    Coarse Casts, UA 3-5 (*)     WBC, UA  (*)     RBC, UA 11-20 (*)     Epithelial Cells, UA 21-50 (*)     Renal Epithelial, UA 11-20 (*)     Bacteria, UA 4+ (*)     All other components within normal limits   LIPASE - Abnormal; Notable for the following components:    Lipase 150.0 (*)     All other components within normal limits   ELECTROPHORESIS PROTEIN, SERUM - Abnormal; Notable for the following components:    Alpha-2-Globulin 1.2 (*)     Gamma Globulin 0.5 (*)     All other components within normal limits   C-REACTIVE PROTEIN - Abnormal; Notable for the following components:    CRP 39.5 (*)     All other components within normal limits   COMPREHENSIVE METABOLIC PANEL - Abnormal; Notable for the following components:    Sodium 132 (*)     Chloride 98 (*)     CO2 20 (*)     Total Protein 6.0 (*)     Alkaline Phosphatase 218 (*)      (*)      (*)     All other components within normal limits   HEPATIC FUNCTION PANEL - Abnormal; Notable for the following components:     Total Protein 5.4 (*)     Alkaline Phosphatase 157 (*)     ALT 67 (*)     AST 53 (*)     All other components within normal limits   CULTURE, URINE    Narrative:     ORDER#: [id number]                          ORDERED BY: Dionne Alfaro  SOURCE: Urine Clean Catch                  COLLECTED:  10/19/22 13:59  ANTIBIOTICS AT [facility].:                      RECEIVED :  10/20/22 01:27   GASTROINTESTINAL PANEL, MOLECULAR    Narrative:     ORDER#: [id number]                          ORDERED BY: GAVI SHORT  SOURCE: Stool Loose                        COLLECTED:  10/20/22 10:47  ANTIBIOTICS AT [facility].:                      RECEIVED :  10/20/22 15:39   C DIFF TOXIN/ANTIGEN    Narrative:     ORDER#: [id number]                          ORDERED BY: Benito Deal  SOURCE: Stool Loose                        COLLECTED:  10/20/22 10:48  ANTIBIOTICS AT [facility].:                      RECEIVED :  10/20/22 10:58  Collect White vial (sterile container)   CBC WITH AUTO DIFFERENTIAL   SAMPLE POSSIBLE BLOOD BANK TESTING   PROTIME-INR   HEPATITIS PANEL, ACUTE   FIDE   TSH   HEPATITIS PANEL, ACUTE   PATHOLOGY INTERPRETATION   CBC   F-ACTIN (SMOOTH MUSCLE) ANTIBODY W/ REFLEX TO TITER   JAYNA BARR VIRUS (EBV) ANTIBODY PANEL I   CALPROTECTIN STOOL   MITOCHONDRIAL ANTIBODIES, M2, IGG   PANCREATIC ELASTASE, FECAL   5-HYDROXYINDOLEACETIC ACID (HIAA) URINE   FECAL FAT, QUALITATIVE   F-ACTIN (SMOOTH MUSCLE) ANTIBODY W/ REFLEX TO TITER         PLAN:   -Patient seen and evaluated. Relevant records reviewed. Patient presenting to the emergency department today with abdominal pain status post EGD. Elevated liver enzymes discussed with Dr. Rosa Vaughn, GI, who suggests admission. CT abdomen pelvis without significant acute findings. Laboratory evaluation today does show contaminated urinalysis, patient does state that she feels like sometimes she has frequency, will treat for UTI, though I do not necessarily believe it to be related to patient's condition today. Will follow culture. Otherwise, pain control provided. Is in no acute distress.   Hospitalist discussed with nurse practitioner today in the ER, and is stable for admission.     Medications   sucralfate (CARAFATE) tablet 1 g (1 g Oral Given 10/21/22 0600)   ondansetron (ZOFRAN) injection 4 mg (4 mg IntraVENous Given 10/20/22 0648)   ondansetron (ZOFRAN-ODT) disintegrating tablet 4 mg (has no administration in time range)   promethazine (PHENERGAN) injection 12.5 mg (has no administration in time range)   promethazine (PHENERGAN) tablet 12.5 mg (has no administration in time range)   lactated ringers infusion ( IntraVENous New Bag 10/20/22 1452)   loperamide (IMODIUM) capsule 2 mg (2 mg Oral Given 10/20/22 1306)   DULoxetine (CYMBALTA) extended release capsule 120 mg (120 mg Oral Given 10/21/22 0950)   levothyroxine (SYNTHROID) tablet 125 mcg (125 mcg Oral Given 10/21/22 0600)   rosuvastatin (CRESTOR) tablet 40 mg (40 mg Oral Given 10/20/22 2010)   sodium chloride flush 0.9 % injection 10 mL (has no administration in time range)   0.9 % sodium chloride infusion (has no administration in time range)   potassium chloride (KLOR-CON M) extended release tablet 40 mEq (has no administration in time range)     Or   potassium bicarb-citric acid (EFFER-K) effervescent tablet 40 mEq (has no administration in time range)     Or   potassium chloride 10 mEq/100 mL IVPB (Peripheral Line) (has no administration in time range)   magnesium sulfate 1000 mg in dextrose 5% 100 mL IVPB (has no administration in time range)   enoxaparin (LOVENOX) injection 40 mg (40 mg SubCUTAneous Given 10/21/22 0949)   acetaminophen (TYLENOL) tablet 650 mg (650 mg Oral Given 10/20/22 0648)     Or   acetaminophen (TYLENOL) suppository 650 mg ( Rectal See Alternative 10/20/22 0648)   atenolol (TENORMIN) tablet 100 mg (100 mg Oral Given 10/21/22 0950)   memantine (NAMENDA) tablet 10 mg (10 mg Oral Given 10/21/22 0950)   pantoprazole (PROTONIX) tablet 40 mg (40 mg Oral Given 10/21/22 0600)   LORazepam (ATIVAN) tablet 0.5 mg (0.5 mg Oral Given 10/21/22 9507)   lamoTRIgine (LAMICTAL) tablet 150 mg (150 mg Oral Not Given 10/21/22 0946)   iopamidol (ISOVUE-370) 76 % injection 75 mL (75 mLs IntraVENous Given 10/19/22 1723)   0.9 % sodium chloride bolus (0 mLs IntraVENous Stopped 10/19/22 2119)   cefTRIAXone (ROCEPHIN) 1,000 mg in dextrose 5 % 50 mL IVPB mini-bag (0 mg IntraVENous Stopped 10/19/22 1955)   morphine sulfate (PF) injection 4 mg (4 mg IntraVENous Given 10/19/22 2017)   dicyclomine (BENTYL) injection 20 mg (20 mg IntraMUSCular Given 10/19/22 2016)   ondansetron (ZOFRAN) injection 4 mg (4 mg IntraVENous Given 10/19/22 2017)   morphine (PF) injection 2 mg (2 mg IntraVENous Given 10/20/22 0222)   gadoteridol (PROHANCE) injection 14 mL (14 mLs IntraVENous Given 10/21/22 0736)           IMPRESSION:    ICD-10-CM    1. Generalized abdominal pain  R10.84       2. Hyponatremia  E87.1       3. Elevated LFTs  R79.89             IAdriana,  am the primary clinician of record. This chart was generated in part by using Dragon Dictation system and may contain errors related to that system including errors in grammar, punctuation, and spelling, as well as words and phrases that may be inappropriate. If there are any questions or concerns please feel free to contact the dictating provider for clarification.      ADRIANA YBARRA DO  [address],   10/21/22 5199

## 2022-10-21 NOTE — CONSULTS
Consult placed  Consult sent via perfect serve  Who:Dr. Cintia Miranda  Date:10/21/2022,  Time:9:54 AM        Electronically signed by Courtney Goyal on 10/21/2022 at 9:54 AM

## 2022-10-21 NOTE — PROGRESS NOTES
PROGRESS NOTE    HPI: Mohsen Lee is a(n)62 y.o. female admitted for work-up and treatment for Carcinoid syndrome ([facility] [address] [E34.0]  Hyponatremia [E87.1]  Generalized abdominal pain [R10.84]  Elevated LFTs [R79.89]. We are following for diarrhea, abdominal pain. Subjective:     Reports improvement in her pain since PPI was increased to twice daily last night. Says she was able to eat dinner without any problems. Diarrhea persists. Reports small amount of rectal bleeding last night. States this happens from time to time. Objective:     I/O last 3 completed shifts: In: 2485 [P.O.:480; I.V.:2005]  Out: -       /74   Pulse 78   Temp 97.8 °F (36.6 °C) (Oral)   Resp 20   Ht 5' 1\" (1.549 m)   Wt 164 lb 1.6 oz (74.4 kg)   SpO2 95%   BMI 31.01 kg/m²     Physical Exam:  HEENT: anicteric sclera, oropharyngeal membranes pink and moist.  Cor: RRR  Lungs: non-labored, no respiratory distress  Abdomen: soft, NT. No ascites. No hepatomegaly or splenomegaly  Extremities: no edema  Neuro: alert and oriented x 3      Results:   Lab Results   Component Value Date    ALT 67 (H) 10/21/2022    AST 53 (H) 10/21/2022    ALKPHOS 157 (H) 10/21/2022    BILIDIR <0.2 10/21/2022    PROT 5.4 (L) 10/21/2022    LABALBU 3.7 10/21/2022    INR 0.95 10/19/2022    LIPASE 150.0 (H) 10/19/2022     Lab Results   Component Value Date    WBC 7.2 10/19/2022    HGB 15.0 10/19/2022    HCT 45.5 10/19/2022    MCV 94.1 10/19/2022     10/19/2022     BUN/Cr/glu/ALT/AST/amyl/lip:  --/--/--/67/53/--/-- (10/21 0156)  [unfilled]      Impression:  Chronic intermittent diarrhea. D. Diff, stool cultures negative. Fecal calprotectin 1550, CRP 39.5. Recent imaging did show pancreatic atrophy. Colonoscopy 2021. S/p CCY. 2. Rectal bleeding. Intermittent, chronic per patient.    No anemia on admission. 3. Upper abdominal pain. No improvement on acid suppressants, PPI increased to bid yesterday. S/p EGD 10/17 with gastritis and gastric polyps. Path pending. 4. ? Carcinoid syndrome (elevated chromogranin A level). No tumor location on recent endoscopy nor PET scan. MRI abdomen largely unremarkable, some limitation from motion artifact on imaging, probable hemangiomas in the liver. Noted oncology consulted to review. Serum protein electrophoresis with presence of hypogammaglobulinemia. It was felt her CgA levels may have been elevated from her Protonix, and interestingly her level did decrease significantly after she discontinued it use for several weeks. 5. Elevated LFTs, cholestatic appearing. Bilirubin normal.   Hep A, B, C negative. ANS, F-actin, M2Ab pending. No new medications recently to suggest DILI. 6. Hx CVA. Plavix. Plan:  Repeat CBC. Suspect rectal bleeding is hemorrhoidal. Prep H PRN. Awaiting liver serologies, gastric biopsy. Continue PPI bid, carafate. Trial Colestipol. Pancreatic elastase. Consider Octreotide scan outpatient. Can restart diet. Please do not hesitate to contact me with questions or concerns.       Electronically signed by: EVANGELINA Alvarado 10/21/2022 9:14 AM

## 2022-10-21 NOTE — PROGRESS NOTES
Pt back from MRI. Pt is alert and orientated. Call light within reach. No complaints of pain at this time. Bee Stevenson RN

## 2022-10-22 VITALS
SYSTOLIC BLOOD PRESSURE: 133 MMHG | OXYGEN SATURATION: 97 % | WEIGHT: 167 LBS | TEMPERATURE: 97.9 F | HEART RATE: 68 BPM | RESPIRATION RATE: 16 BRPM | BODY MASS INDEX: 31.53 KG/M2 | DIASTOLIC BLOOD PRESSURE: 69 MMHG | HEIGHT: 61 IN

## 2022-10-22 LAB
ALBUMIN SERPL-MCNC: 3.4 G/DL (ref 3.4–5)
ALP BLD-CCNC: 133 U/L (ref 40–129)
ALT SERPL-CCNC: 46 U/L (ref 10–40)
AST SERPL-CCNC: 33 U/L (ref 15–37)
BILIRUB SERPL-MCNC: <0.2 MG/DL (ref 0–1)
BILIRUBIN DIRECT: <0.2 MG/DL (ref 0–0.3)
BILIRUBIN, INDIRECT: ABNORMAL MG/DL (ref 0–1)
EPSTEIN BARR VIRUS NUCLEAR AB IGG: 107 U/ML (ref 0–21.9)
EPSTEIN-BARR EARLY ANTIGEN ANTIBODY: 119 U/ML (ref 0–10.9)
EPSTEIN-BARR VCA IGG: 272 U/ML (ref 0–21.9)
EPSTEIN-BARR VCA IGM: 15.8 U/ML (ref 0–43.9)
F-ACTIN AB IGG: 2 UNITS (ref 0–19)
F-ACTIN AB IGG: 2 UNITS (ref 0–19)
TOTAL PROTEIN: 5 G/DL (ref 6.4–8.2)

## 2022-10-22 PROCEDURE — 6370000000 HC RX 637 (ALT 250 FOR IP): Performed by: NURSE PRACTITIONER

## 2022-10-22 PROCEDURE — 6370000000 HC RX 637 (ALT 250 FOR IP): Performed by: INTERNAL MEDICINE

## 2022-10-22 PROCEDURE — 6360000002 HC RX W HCPCS: Performed by: INTERNAL MEDICINE

## 2022-10-22 PROCEDURE — 80076 HEPATIC FUNCTION PANEL: CPT

## 2022-10-22 PROCEDURE — 36415 COLL VENOUS BLD VENIPUNCTURE: CPT

## 2022-10-22 PROCEDURE — 82653 EL-1 FECAL QUANTITATIVE: CPT

## 2022-10-22 RX ORDER — LOPERAMIDE HYDROCHLORIDE 2 MG/1
2 CAPSULE ORAL 4 TIMES DAILY PRN
Qty: 30 CAPSULE | Refills: 0 | Status: SHIPPED | OUTPATIENT
Start: 2022-10-22 | End: 2022-11-01

## 2022-10-22 RX ORDER — CHOLESTYRAMINE 4 G/9G
2 POWDER, FOR SUSPENSION ORAL 2 TIMES DAILY WITH MEALS
Qty: 90 PACKET | Refills: 3 | Status: SHIPPED | OUTPATIENT
Start: 2022-10-22

## 2022-10-22 RX ORDER — SUCRALFATE 1 G/1
1 TABLET ORAL 4 TIMES DAILY
Qty: 120 TABLET | Refills: 0 | Status: SHIPPED | OUTPATIENT
Start: 2022-10-22

## 2022-10-22 RX ADMIN — ENOXAPARIN SODIUM 40 MG: 100 INJECTION SUBCUTANEOUS at 10:30

## 2022-10-22 RX ADMIN — ACETAMINOPHEN 650 MG: 325 TABLET ORAL at 15:20

## 2022-10-22 RX ADMIN — SUCRALFATE 1 G: 1 TABLET ORAL at 06:13

## 2022-10-22 RX ADMIN — LEVOTHYROXINE SODIUM 125 MCG: 0.12 TABLET ORAL at 06:13

## 2022-10-22 RX ADMIN — PANTOPRAZOLE SODIUM 40 MG: 40 TABLET, DELAYED RELEASE ORAL at 15:20

## 2022-10-22 RX ADMIN — SUCRALFATE 1 G: 1 TABLET ORAL at 12:33

## 2022-10-22 RX ADMIN — CHOLESTYRAMINE 2 G: 4 POWDER, FOR SUSPENSION ORAL at 10:29

## 2022-10-22 RX ADMIN — ATENOLOL 100 MG: 25 TABLET ORAL at 10:29

## 2022-10-22 RX ADMIN — DULOXETINE HYDROCHLORIDE 120 MG: 60 CAPSULE, DELAYED RELEASE ORAL at 10:29

## 2022-10-22 RX ADMIN — ONDANSETRON 4 MG: 2 INJECTION INTRAMUSCULAR; INTRAVENOUS at 14:00

## 2022-10-22 RX ADMIN — LORAZEPAM 0.5 MG: 0.5 TABLET ORAL at 15:20

## 2022-10-22 RX ADMIN — PANTOPRAZOLE SODIUM 40 MG: 40 TABLET, DELAYED RELEASE ORAL at 06:13

## 2022-10-22 RX ADMIN — MEMANTINE 10 MG: 5 TABLET ORAL at 10:29

## 2022-10-22 ASSESSMENT — PAIN SCALES - GENERAL: PAINLEVEL_OUTOF10: 4

## 2022-10-22 NOTE — PROGRESS NOTES
round, and reactive to light. Extra ocular muscles intact. Conjunctivae/corneas clear. Neck: Supple, with full range of motion. No jugular venous distention. Trachea midline. Respiratory:  Normal respiratory effort. Clear to auscultation, bilaterally without Rales/Wheezes/Rhonchi. Cardiovascular:  Regular rate and rhythm with normal S1/S2 without murmurs, rubs or gallops. Abdomen: Soft, non-tender, non-distended with normal bowel sounds. Musculoskeletal:  No clubbing, cyanosis or edema bilaterally. Full range of motion without deformity. Skin: Skin color, texture, turgor normal.  No rashes or lesions. Neurologic:  Neurovascularly intact without any focal sensory/motor deficits. Cranial nerves: II-XII intact, grossly non-focal.  Psychiatric:  Alert and oriented, thought content appropriate, normal insight  Capillary Refill: Brisk,< 3 seconds   Peripheral Pulses: +2 palpable, equal bilaterally       Labs: For convenience and continuity at follow-up the following most recent labs are provided:      CBC:    Lab Results   Component Value Date/Time    WBC 4.4 10/21/2022 05:35 AM    HGB 12.9 10/21/2022 05:35 AM    HCT 39.8 10/21/2022 05:35 AM     10/21/2022 05:35 AM       Renal:    Lab Results   Component Value Date/Time     10/20/2022 09:09 AM    K 4.2 10/20/2022 09:09 AM    K 4.0 01/18/2021 06:42 AM    CL 98 10/20/2022 09:09 AM    CO2 20 10/20/2022 09:09 AM    BUN 17 10/20/2022 09:09 AM    CREATININE 0.9 10/20/2022 09:09 AM    CALCIUM 9.0 10/20/2022 09:09 AM         Significant Diagnostic Studies    Radiology:   MRI ABDOMEN W WO CONTRAST   Preliminary Result   1. 1.5 cm segment VII hepatic hemangioma. There is an indeterminate 5 mm   focus of arterial enhancement in segment IVb seen only on the arterial phase   which could reflect a vascular shunt or a flash filling hemangioma. However,   given history of carcinoid syndrome, a subtle metastasis cannot be entirely   excluded.   Recommend a short-term follow-up MRI in 3 months. 2. Questionable wall thickening along the 2nd portion of the duodenum   adjacent to the common bile duct. Recommend correlation with endoscopy. 3. Status post cholecystectomy. CT ABDOMEN PELVIS W IV CONTRAST Additional Contrast? None   Final Result   1. No CT evidence of an acute intra-abdominal or intrapelvic process. 2. Mild diverticulosis coli without CT evidence of acute diverticulitis. 3.  Mild intra and extrahepatic bile duct dilatation status post   cholecystectomy typical of reservoir effect. 4.  No findings to suggest acute appendicitis; no ureter calculus or   hydronephrosis. Consults:     IP CONSULT TO GI  IP CONSULT TO HEM/ONC    Disposition: Home    Condition at Discharge: Stable    Discharge Instructions/Follow-up: GI as outpatient. Oncology if needed. Code Status:  Full Code     Activity: activity as tolerated    Diet: regular diet      Discharge Medications:     Current Discharge Medication List             Details   cholestyramine (QUESTRAN) 4 g packet Take 0.5 packets by mouth 2 times daily (with meals)  Qty: 90 packet, Refills: 3      loperamide (IMODIUM) 2 MG capsule Take 1 capsule by mouth 4 times daily as needed for Diarrhea  Qty: 30 capsule, Refills: 0      sucralfate (CARAFATE) 1 GM tablet Take 1 tablet by mouth 4 times daily  Qty: 120 tablet, Refills: 0                Details   levothyroxine (SYNTHROID) 125 MCG tablet TAKE 1 TABLET BY MOUTH EVERY DAY  Qty: 90 tablet, Refills: 0    Associated Diagnoses: Acquired hypothyroidism      atenolol (TENORMIN) 25 MG tablet Take 50 mg by mouth daily 50mgs in morning and 25mgs in evening      pantoprazole sodium (PROTONIX) 40 MG PACK packet Take 40 mg by mouth every morning (before breakfast)      rosuvastatin (CRESTOR) 40 MG tablet Take 40 mg by mouth every evening      ! ! Ubrogepant 50 MG TABS Take 50 mg by mouth      ! ! Ubrogepant 100 MG TABS Take 100 mg by mouth as needed (up to one dose)      clopidogrel (PLAVIX) 75 MG tablet Take 75 mg by mouth daily      orphenadrine (NORFLEX) 100 MG extended release tablet Take 100 mg by mouth 2 times daily as needed       promethazine (PHENERGAN) 25 MG tablet Take 25 mg by mouth every 6 hours as needed for Nausea      diclofenac sodium (VOLTAREN) 1 % GEL Apply 4 g topically 3 times daily as needed for Pain Apply to LEFT knee  Qty: 1 Tube, Refills: 1    Associated Diagnoses: Primary osteoarthritis of left knee      lamoTRIgine (LAMICTAL) 150 MG tablet Take 150 mg by mouth daily      DULoxetine (CYMBALTA) 60 MG capsule Take 2 capsules by mouth daily  Qty: 120 capsule, Refills: 3      Cholecalciferol (VITAMIN D) 2000 UNITS CAPS capsule Take 1 capsule by mouth daily       ! ! - Potential duplicate medications found. Please discuss with provider. Time Spent on discharge is more than 45 minutes in the examination, evaluation, counseling and review of medications and discharge plan. Signed:    Mary Carmen Adair MD   10/22/2022      Thank you Deon Irizarry MD for the opportunity to be involved in this patient's care. If you have any questions or concerns, please feel free to contact me at [phone].

## 2022-10-22 NOTE — CONSULTS
[facility]    Consult Note      Requesting Physician:  Dr. Martinez Section:  possible carcinoid        HISTORY OF PRESENT ILLNESS:      Ms. Montse Bucio  is a 69-year-old with extensive diarrhea. She has been worked up with an endoscopy and colonoscopy which have been negative. A dotatate PET scan also did not show any evidence of carcinoid. MRI of the abdomen showed what is likely a hemangioma in the liver. Chromogranin has been elevated. Urinary 5-HIAA  and other carcinoid labs have already been ordered. These are pending.       Past Medical History:    Past Medical History:   Diagnosis Date    Anticoagulant long-term use     Anxiety     Arthritis     Carcinoid syndrome ([facility] [address] 10/19/2022    Cerebrovascular disease 2/2014    stroke    Depression     Endometriosis     Fibromyalgia     Generalized osteoarthritis 3/6/2015    GERD (gastroesophageal reflux disease)     H/O suicide attempt     Headache(784.0)     Hyperlipidemia     Hypertension     Hypothyroidism     Irritable bowel syndrome     Migraines     Obesity     Patent foramen ovale 2/27/2014    PONV (postoperative nausea and vomiting)      Past Surgical History:    Past Surgical History:   Procedure Laterality Date    CHOLECYSTECTOMY  1990's    laparoscopic    COLONOSCOPY  2014    COLONOSCOPY  06/29/2017    random biopsies    COLONOSCOPY  01/23/2017    polyp    HYSTERECTOMY (CERVIX STATUS UNKNOWN)  1980's    total vaginal hysterectomy, retains both ovaries, menorrhagia, fibroid tumors    JOINT REPLACEMENT Left 06/28/2018    LTK     KNEE CARTILAGE SURGERY Right     KNEE SURGERY      THYROIDECTOMY         Current Medications:    Current Facility-Administered Medications   Medication Dose Route Frequency Provider Last Rate Last Admin    lamoTRIgine (LAMICTAL) tablet 150 mg  150 mg Oral Daily Jordan Cruz MD cholestyramine (QUESTRAN) packet 2 g  2 g Oral BID [facility], APRN - CNP   2 g at 10/21/22 1623    memantine (NAMENDA) tablet 10 mg  10 mg Oral BID Severa Milroy, MD   10 mg at 10/21/22 2200    pantoprazole (PROTONIX) tablet 40 mg  40 mg Oral BID AC Elias Parker MD   40 mg at 10/22/22 4327    LORazepam (ATIVAN) tablet 0.5 mg  0.5 mg Oral BID PRN Phani Aguilar MD   0.5 mg at 10/21/22 2201    sucralfate (CARAFATE) tablet 1 g  1 g Oral 4 times per day Severa Milroy, MD   1 g at 10/22/22 0613    ondansetron [facility]) injection 4 mg  4 mg IntraVENous Q4H PRN Severa Milroy, MD   4 mg at 10/20/22 0648    ondansetron (ZOFRAN-ODT) disintegrating tablet 4 mg  4 mg Oral Q4H PRN Severa Milroy, MD        promethazine (PHENERGAN) injection 12.5 mg  12.5 mg IntraMUSCular Q4H PRN Severa Milroy, MD        promethazine (PHENERGAN) tablet 12.5 mg  12.5 mg Oral Q4H PRN Severa Milroy, MD        lactated ringers infusion   IntraVENous Continuous Severa Milroy,  mL/hr at 10/21/22 1612 New Bag at 10/21/22 1612    loperamide (IMODIUM) capsule 2 mg  2 mg Oral 4x Daily PRN Severa Milroy, MD   2 mg at 10/20/22 1306    DULoxetine (CYMBALTA) extended release capsule 120 mg  120 mg Oral Daily Severa Milroy, MD   120 mg at 10/21/22 0950    levothyroxine (SYNTHROID) tablet 125 mcg  125 mcg Oral Daily Severa Milroy, MD   125 mcg at 10/22/22 1003    rosuvastatin (CRESTOR) tablet 40 mg  40 mg Oral Nightly Severa Milroy, MD   40 mg at 10/21/22 2201    sodium chloride flush 0.9 % injection 10 mL  10 mL IntraVENous PRN Severa Milroy, MD   10 mL at 10/21/22 2204    0.9 % sodium chloride infusion   IntraVENous PRN Severa Milroy, MD        potassium chloride (KLOR-CON M) extended release tablet 40 mEq  40 mEq Oral PRN Severa Milroy, MD        Or    potassium bicarb-citric acid (EFFER-K) effervescent tablet 40 mEq  40 mEq Oral PRN Severa Milroy, MD        Or    potassium chloride 10 mEq/100 mL IVPB (Peripheral Line)  10 mEq IntraVENous PRN Danielle Woodall MD        magnesium sulfate 1000 mg in dextrose 5% 100 mL IVPB  1,000 mg IntraVENous PRN Danielle Woodall MD        enoxaparin (LOVENOX) injection 40 mg  40 mg SubCUTAneous Daily Danielle Woodall MD   40 mg at 10/21/22 0949    acetaminophen (TYLENOL) tablet 650 mg  650 mg Oral Q6H PRN Danielle Woodall MD   650 mg at 10/20/22 1927    Or    acetaminophen (TYLENOL) suppository 650 mg  650 mg Rectal Q6H PRN Danielle Woodall MD        atenolol (TENORMIN) tablet 100 mg  100 mg Oral BID Danielle Woodall MD   100 mg at 10/21/22 2200     Allergies: Allergies   Allergen Reactions    Latex Other (See Comments)     Turns skin really red with use of latex gloves    Codeine Hives     Pt can take Norco with Benadryl    Ketorolac     Ketorolac Tromethamine     Kiwi Extract Itching    Nsaids Other (See Comments)     \"tear up my stomach\"    Penicillins Hives    Tolmetin        Social History:   Social History     Socioeconomic History    Marital status:      Spouse name: Not on file    Number of children: Not on file    Years of education: Not on file    Highest education level: Not on file   Occupational History    Occupation: retired   Tobacco Use    Smoking status: Never    Smokeless tobacco: Never   Vaping Use    Vaping Use: Never used   Substance and Sexual Activity    Alcohol use:  Yes     Alcohol/week: 2.0 standard drinks     Types: 2 Glasses of wine per week     Comment: socially    Drug use: No    Sexual activity: Not Currently     Birth control/protection: Post-menopausal   Other Topics Concern    Not on file   Social History Narrative    Not on file     Social Determinants of Health     Financial Resource Strain: Not on file   Food Insecurity: Not on file   Transportation Needs: Not on file   Physical Activity: Not on file   Stress: Not on file   Social Connections: Not on file   Intimate Partner Violence: Not on file   Housing Stability: Not on file Family History:     Family History   Problem Relation Age of Onset    Clotting Disorder Mother     Other Mother         lung disease    Cancer Father         colon, brain    Arthritis Father         polio    Heart Disease Father     Heart Disease Brother     Obesity Brother     Substance Abuse Brother      REVIEW OF SYSTEMS:      Constitutional:  No weight loss, No fever, No chills, No night sweats. Energy level good.   Eyes:  No impairment or change in vision  ENT / Mouth:  No pain, abnormal ulceration, bleeding, nasal drip or change in voice or hearing  Cardiovascular:  No chest pain, palpitations, new edema, or calf discomfort  Respiratory:  No pain, hemoptysis, change to breathing  Breast:  No pain, discharge, change in appearance or texture  Gastrointestinal:  No pain, cramping, jaundice, change to eating and bowel habits  Urinary:  No pain, bleeding or change in continence  Genitalia: No pain, bleeding or discharge  Musculoskeletal:  No redness, pain, edema or weakness  Skin:  No pruritus, rash, change to nodules or lesions  Neurologic:  No discomfort, change in mental status, speech, sensory or motor activity  Psychiatric:  No change in concentration or change to affect or mood  Endocrine:  No hot flashes, increased thirst, or change to urine production  Hematologic: No petechiae, ecchymosis or bleeding  Lymphatic:  No lymphadenopathy or lymphedema  Allergy / Immunologic:  No eczema, hives, frequent or recurrent infections    PHYSICAL EXAM:      Vitals:  /70   Pulse 74   Temp 98 °F (36.7 °C) (Oral)   Resp 16   Ht 5' 1\" (1.549 m)   Wt 167 lb (75.8 kg)   SpO2 95%   BMI 31.55 kg/m²   CONSTITUTIONAL: awake, alert, cooperative, no apparent distress   EYES: pupils equal, round and reactive to light, sclera clear and conjunctiva normal  ENT: Normocephalic, without obvious abnormality, atraumatic  NECK: supple, symmetrical, no jugular venous distension and no carotid bruits HEMATOLOGIC/LYMPHATIC: no cervical, supraclavicular or axillary lymphadenopathy   LUNGS: no increased work of breathing and clear to auscultation   CARDIOVASCULAR: regular rate and rhythm, normal S1 and S2, no murmur noted  ABDOMEN: normal bowel sounds x 4, soft, non-distended, non-tender, no masses palpated, no hepatosplenomgaly   MUSCULOSKELETAL: full range of motion noted, tone is normal  NEUROLOGIC: awake, alert, oriented to name, place and time. Motor skills grossly intact. SKIN: Normal skin color, texture, turgor and no jaundice. appears intact   EXTREMITIES: no LE edema       DATA:    PT/INR:    Recent Labs     10/19/22  1907 10/20/22  0909 10/21/22  0535 10/22/22  0548   PROT 6.5 6.0* 5.4* 5.0*   INR 0.95  --   --   --      PTT:  No results for input(s): APTT in the last 72 hours.   CMP:    Lab Results   Component Value Date/Time     10/20/2022 09:09 AM    K 4.2 10/20/2022 09:09 AM    K 4.0 01/18/2021 06:42 AM    CL 98 10/20/2022 09:09 AM    CO2 20 10/20/2022 09:09 AM    BUN 17 10/20/2022 09:09 AM    PROT 5.0 10/22/2022 05:48 AM     :    Lab Results   Component Value Date/Time    MG 2.10 04/12/2019 06:40 PM     Phosphorus:  No components found for: PO4  Calcium:  No results found for: CA  CBC:    Lab Results   Component Value Date/Time    WBC 4.4 10/21/2022 05:35 AM    RBC 4.17 10/21/2022 05:35 AM    HGB 12.9 10/21/2022 05:35 AM    HCT 39.8 10/21/2022 05:35 AM    MCV 95.2 10/21/2022 05:35 AM    RDW 14.3 10/21/2022 05:35 AM     10/21/2022 05:35 AM     DIFF:  Lab Results   Component Value Date/Time    MCV 95.2 10/21/2022 05:35 AM    RDW 14.3 10/21/2022 05:35 AM      [email or web address]  Uric Acid:  @[email or web address]([email or web address])@    Radiology Review:          Problem List  Patient Active Problem List   Diagnosis    Headache, chronic daily    Hyperlipidemia    Cerebral artery occlusion with cerebral infarction (HCC)    GERD (gastroesophageal reflux disease)    Hypothyroidism    Anxiety    Depression    H/O suicide attempt    Cerebral infarction [facility])    Disc disorder of cervical region    Fibromyalgia    Generalized osteoarthritis    Irritable bowel syndrome    Impingement syndrome of shoulder region    Patent foramen ovale    Spondyloarthropathy    Menopause    History of total vaginal hysterectomy    Vaginal atrophy    S/P right knee arthroscopy, in 2012 by Dr. Tamera Griffin    Primary osteoarthritis of left knee    Chondromalacia patellae of left knee    Decreased libido    Chondromalacia patellae of right knee    Primary osteoarthritis of right knee    Tear of medial meniscus of right knee    Knee effusion    Right knee pain    Left knee pain    Chronic pain of right knee    Chronic pain of left knee    Dyspareunia in female    Primary osteoarthritis of both knees    Status post total left knee replacement    S/P TKR (total knee replacement) using cement, left    Obesity (BMI 30-39. 9)    History of CVA in adulthood    Dizziness    Variants of migraine    HTN (hypertension)    Carcinoid syndrome (HCC)    Nausea vomiting and diarrhea       IMPRESSION/RECOMMENDATIONS:    Diarrhea:  -Elevated chromogranin  -24-hour urine for 5-HIAA is pending  -CT scan is negative  -Dotatate PET scan is negative  -MRI of the abdomen just shows what is likely a small hemangioma in the liver  -Octreotide scan is pending  -If all of the above are negative, I would consider a capsule endoscopy. -Agree with work-up for GI      Thank you for asking me to see the patient.        Jose De Jesus Chong MD  Please Contact Through [employer]

## 2022-10-22 NOTE — PROGRESS NOTES
PROGRESS NOTE  S:62 yrs Patient  admitted on 10/19/2022 with Carcinoid syndrome ([facility] [address] [E34.0]  Hyponatremia [E87.1]  Generalized abdominal pain [R10.84]  Elevated LFTs [R79.89] . Today she feels well. Tolerating diet. No BM since admission. Exam:   Vitals:    10/22/22 0600   BP: 103/65   Pulse: 70   Resp: 18   Temp: 98 °F (36.7 °C)   SpO2: 94%      General appearance: alert, appears stated age, and cooperative  HEENT: Sclera clear, anicteric  Neck: supple, symmetrical, trachea midline  Lungs: clear to auscultation bilaterally  Heart: regular rate and rhythm  Abdomen: soft, non-tender; bowel sounds normal; no masses,  no organomegaly  Extremities: extremities normal, atraumatic, no cyanosis or edema     Medications: Reviewed    Labs:  CBC:   Recent Labs     10/19/22  1400 10/21/22  0535   WBC 7.2 4.4   HGB 15.0 12.9   HCT 45.5 39.8   MCV 94.1 95.2    152     BMP:   Recent Labs     10/19/22  1400 10/20/22  0909   * 132*   K 4.3 4.2   CL 92* 98*   CO2 23 20*   BUN 20 17   CREATININE 1.2 0.9     LIVER PROFILE:   Recent Labs     10/19/22  1400 10/19/22  1907 10/20/22  0909 10/21/22  0535 10/22/22  0548   *  --  117* 53* 33   *  --  114* 67* 46*   LIPASE 150.0*  --   --   --   --    PROT 7.3   < > 6.0* 5.4* 5.0*   BILIDIR  --   --   --  <0.2 <0.2   BILITOT 1.0  --  0.3 0.3 <0.2   ALKPHOS 289*  --  218* 157* 133*    < > = values in this interval not displayed.      PT/INR:   Recent Labs     10/19/22  1907   INR 0.95     Impression:60year old female with history of HTN, HLD, CVA, anxiety, depression, arthritis, fibromyalgia and chronic diarrhea admitted with nausea, vomiting and diarrhea    Recommendation:  Continue supportive care  Advance to low fiber diet  Stool tests if samples available  Monitor LFTs  Oncology consulted  OK to d/c from GI standpoint with outpt follow up      Sami Smith MD, MD  9:17 AM 10/22/2022

## 2022-10-23 NOTE — DISCHARGE SUMMARY
Hospital Medicine Discharge Summary    Patient ID: Dave Cummings      Patient's PCP: Padmini Finn MD    Admit Date: 10/19/2022     Discharge Date: 10/22/2022  10/23/22     Admitting Provider: Anna Brown MD     Discharge Provider: Tali Burch MD     Discharge Diagnoses: Active Hospital Problems    Diagnosis     Carcinoid syndrome ([address] [E34.0]      Priority: Medium    Nausea vomiting and diarrhea [R11.2, R19.7]      Priority: Medium    HTN (hypertension) [I10]     History of CVA in adulthood [Z86.73]     GERD (gastroesophageal reflux disease) [K21.9]     Hypothyroidism [E03.9]     Irritable bowel syndrome [K58.9]        The patient was seen and examined on day of discharge and this discharge summary is in conjunction with any daily progress note from day of discharge. Hospital Course:     Patient presented to emergency department with symptoms of flushing and diarrhea. These started gradually and became intolerable. Imaging was performed. There was a suspicion of a mass in duodenum and another mass in the liver that was initially considered as a potential carcinoid tumor. Gastroenterology and oncology were consulted. It is not clear that patient has carcinoid tumor and carcinoid syndrome. Some lab tests were ordered. Not all of those are back. Patient's symptoms improved with symptomatic management. At this point, GI and oncology recommended outpatient follow-up. Patient is agreeable and will be discharged home with same symptomatic medications. Stable and asymptomatic on the day of discharge. Being discharged home. Aware of follow-up information. Physical Exam Performed:     /69   Pulse 68   Temp 97.9 °F (36.6 °C) (Oral)   Resp 16   Ht 5' 1\" (1.549 m)   Wt 167 lb (75.8 kg)   SpO2 97%   BMI 31.55 kg/m²       General appearance:  No apparent distress, appears stated age and cooperative. HEENT:  Normal cephalic, atraumatic without obvious deformity.  Pupils equal, round, and reactive to light. Extra ocular muscles intact. Conjunctivae/corneas clear. Neck: Supple, with full range of motion. No jugular venous distention. Trachea midline. Respiratory:  Normal respiratory effort. Clear to auscultation, bilaterally without Rales/Wheezes/Rhonchi. Cardiovascular:  Regular rate and rhythm with normal S1/S2 without murmurs, rubs or gallops. Abdomen: Soft, non-tender, non-distended with normal bowel sounds. Musculoskeletal:  No clubbing, cyanosis or edema bilaterally. Full range of motion without deformity. Skin: Skin color, texture, turgor normal.  No rashes or lesions. Neurologic:  Neurovascularly intact without any focal sensory/motor deficits. Cranial nerves: II-XII intact, grossly non-focal.  Psychiatric:  Alert and oriented, thought content appropriate, normal insight  Capillary Refill: Brisk,< 3 seconds   Peripheral Pulses: +2 palpable, equal bilaterally       Labs: For convenience and continuity at follow-up the following most recent labs are provided:      CBC:    Lab Results   Component Value Date/Time    WBC 4.4 10/21/2022 05:35 AM    HGB 12.9 10/21/2022 05:35 AM    HCT 39.8 10/21/2022 05:35 AM     10/21/2022 05:35 AM       Renal:    Lab Results   Component Value Date/Time     10/20/2022 09:09 AM    K 4.2 10/20/2022 09:09 AM    K 4.0 01/18/2021 06:42 AM    CL 98 10/20/2022 09:09 AM    CO2 20 10/20/2022 09:09 AM    BUN 17 10/20/2022 09:09 AM    CREATININE 0.9 10/20/2022 09:09 AM    CALCIUM 9.0 10/20/2022 09:09 AM         Significant Diagnostic Studies    Radiology:   MRI ABDOMEN W WO CONTRAST   Preliminary Result   1. 1.5 cm segment VII hepatic hemangioma. There is an indeterminate 5 mm   focus of arterial enhancement in segment IVb seen only on the arterial phase   which could reflect a vascular shunt or a flash filling hemangioma. However,   given history of carcinoid syndrome, a subtle metastasis cannot be entirely   excluded.   Recommend a short-term follow-up MRI in 3 months. 2. Questionable wall thickening along the 2nd portion of the duodenum   adjacent to the common bile duct. Recommend correlation with endoscopy. 3. Status post cholecystectomy. CT ABDOMEN PELVIS W IV CONTRAST Additional Contrast? None   Final Result   1. No CT evidence of an acute intra-abdominal or intrapelvic process. 2. Mild diverticulosis coli without CT evidence of acute diverticulitis. 3.  Mild intra and extrahepatic bile duct dilatation status post   cholecystectomy typical of reservoir effect. 4.  No findings to suggest acute appendicitis; no ureter calculus or   hydronephrosis. Consults:     IP CONSULT TO GI  IP CONSULT TO HEM/ONC    Disposition: Home    Condition at Discharge: Stable    Discharge Instructions/Follow-up: GI as outpatient. Oncology if needed.     Code Status:  Prior     Activity: activity as tolerated    Diet: regular diet      Discharge Medications:     Discharge Medication List as of 10/22/2022  2:52 PM             Details   cholestyramine (QUESTRAN) 4 g packet Take 0.5 packets by mouth 2 times daily (with meals), Disp-90 packet, R-3Normal      loperamide (IMODIUM) 2 MG capsule Take 1 capsule by mouth 4 times daily as needed for Diarrhea, Disp-30 capsule, R-0Normal      sucralfate (CARAFATE) 1 GM tablet Take 1 tablet by mouth 4 times daily, Disp-120 tablet, R-0Normal                Details   levothyroxine (SYNTHROID) 125 MCG tablet TAKE 1 TABLET BY MOUTH EVERY DAY, Disp-90 tablet, R-0Normal      atenolol (TENORMIN) 25 MG tablet Take 50 mg by mouth daily 50mgs in morning and 25mgs in eveningHistorical Med      pantoprazole sodium (PROTONIX) 40 MG PACK packet Take 40 mg by mouth every morning (before breakfast)Historical Med      rosuvastatin (CRESTOR) 40 MG tablet Take 40 mg by mouth every eveningHistorical Med      !! Ubrogepant 50 MG TABS Take 50 mg by mouthHistorical Med      !! Ubrogepant 100 MG TABS Take 100 mg by mouth as needed (up to one dose)Historical Med      clopidogrel (PLAVIX) 75 MG tablet Take 75 mg by mouth dailyHistorical Med      orphenadrine (NORFLEX) 100 MG extended release tablet Take 100 mg by mouth 2 times daily as needed Historical Med      promethazine (PHENERGAN) 25 MG tablet Take 25 mg by mouth every 6 hours as needed for NauseaHistorical Med      diclofenac sodium (VOLTAREN) 1 % GEL Apply 4 g topically 3 times daily as needed for Pain Apply to LEFT knee, Topical, 3 TIMES DAILY PRN Starting Mon 8/6/2018, Until Fri 10/19/2018, Disp-1 Tube, R-1, Normal      lamoTRIgine (LAMICTAL) 150 MG tablet Take 150 mg by mouth dailyHistorical Med      DULoxetine (CYMBALTA) 60 MG capsule Take 2 capsules by mouth daily, Disp-120 capsule, R-3      Cholecalciferol (VITAMIN D) 2000 UNITS CAPS capsule Take 1 capsule by mouth daily       ! ! - Potential duplicate medications found. Please discuss with provider. Time Spent on discharge is more than 45 minutes in the examination, evaluation, counseling and review of medications and discharge plan. Signed:    Tavia Orr MD   10/23/2022      Thank you Sonali Mike MD for the opportunity to be involved in this patient's care. If you have any questions or concerns, please feel free to contact me at [phone].

## 2022-10-25 LAB — PANCREATIC ELASTASE, FECAL: >800 UG/G

## 2022-10-26 LAB — MITOCHONDRIAL M2 AB, IGG: <0.5 U/ML (ref 0–4)

## 2022-10-31 ENCOUNTER — HOSPITAL ENCOUNTER (OUTPATIENT)
Dept: GENERAL RADIOLOGY | Age: 62
Discharge: HOME | End: 2022-10-31
Payer: MEDICARE

## 2022-10-31 DIAGNOSIS — R19.5 ELEVATED FECAL CALPROTECTIN: ICD-10-CM

## 2022-10-31 PROCEDURE — 74250 X-RAY XM SM INT 1CNTRST STD: CPT

## 2023-10-24 ENCOUNTER — ANESTHESIA EVENT (OUTPATIENT)
Dept: ENDOSCOPY | Age: 63
End: 2023-10-24
Payer: MEDICARE

## 2023-10-24 RX ORDER — LISINOPRIL 40 MG/1
40 TABLET ORAL DAILY
COMMUNITY
Start: 2022-08-25

## 2023-10-24 RX ORDER — MEMANTINE HYDROCHLORIDE 10 MG/1
10 TABLET ORAL 2 TIMES DAILY
COMMUNITY
Start: 2022-03-28

## 2023-10-24 NOTE — PROGRESS NOTES
..1.  Do not eat or drink anything after 12 midnight prior to surgery. This includes no water, chewing gum or mints, except for bowel prep complete per MD.  2.  Take the following pills with a small sip of water on the morning of surgery 10/24/2023.  3.  Aspirin, Ibuprofen, Advil, Naproxen, Vitamin E and other Anti-inflammatory products should be stopped for 5 days before surgery or as directed by your physician. 4.  Check with your doctor regarding stopping Plavix, Coumadin, Lovenox, Fragmin or other blood thinners. 5.  Do not smoke and do not drink alcoholic beverages 24 hours prior to surgery. This includes NA Beer. 6.  You may brush your teeth and gargle the morning of surgery. DO NOT SWALLOW WATER.  7.  You MUST make arrangements for a responsible adult to take you home after your surgery. You will not be allowed to leave alone or drive yourself home. It is strongly suggested someone stay with you the first 24 hours. Your surgery will be cancelled if you do not have a ride home. 8.  A parent/legal guardian must accompany a child scheduled for surgery and plan to stay at the hospital until the child is discharged. Please do not bring other children with you. 9.  Please wear simple, loose fitting clothing to the hospital.  Vinton Peels not bring valuables ( money, credit cards, checkbooks, etc.)  Do not wear any makeup (including no eye makeup) or nail polish on your fingers or toes. 10.  Do not wear any jewelry or piercing on the day of surgery. All body piercing jewelry must be removed. 11.  If you have dentures, they will be removed before going to the OR; we will provide you a container. If you wear contact lenses or glasses, they will be removed; please bring a case for them.   15.  Notify your Surgeon if you develop any illness between now and surgery time; cough, cold, fever, sore throat, nausea, vomiting, etc.  Please notify your surgeon if you experience dizziness, shortness of breath or blurred

## 2023-11-01 ENCOUNTER — ANESTHESIA (OUTPATIENT)
Dept: ENDOSCOPY | Age: 63
End: 2023-11-01
Payer: MEDICARE

## 2023-11-01 ENCOUNTER — HOSPITAL ENCOUNTER (OUTPATIENT)
Age: 63
Setting detail: OUTPATIENT SURGERY
Discharge: HOME | End: 2023-11-01
Attending: INTERNAL MEDICINE | Admitting: INTERNAL MEDICINE
Payer: MEDICARE

## 2023-11-01 VITALS
OXYGEN SATURATION: 94 % | DIASTOLIC BLOOD PRESSURE: 56 MMHG | RESPIRATION RATE: 14 BRPM | TEMPERATURE: 98.2 F | SYSTOLIC BLOOD PRESSURE: 94 MMHG | HEART RATE: 66 BPM | BODY MASS INDEX: 30.58 KG/M2 | WEIGHT: 162 LBS | HEIGHT: 61 IN

## 2023-11-01 LAB
ANION GAP SERPL CALCULATED.3IONS-SCNC: 10 MMOL/L (ref 3–16)
BUN SERPL-MCNC: 19 MG/DL (ref 7–20)
CALCIUM SERPL-MCNC: 9.8 MG/DL (ref 8.3–10.6)
CHLORIDE SERPL-SCNC: 102 MMOL/L (ref 99–110)
CO2 SERPL-SCNC: 28 MMOL/L (ref 21–32)
CREAT SERPL-MCNC: 0.8 MG/DL (ref 0.6–1.2)
DEPRECATED RDW RBC AUTO: 15.1 % (ref 12.4–15.4)
GFR SERPLBLD CREATININE-BSD FMLA CKD-EPI: >60 ML/MIN/{1.73_M2}
GLUCOSE SERPL-MCNC: 88 MG/DL (ref 70–99)
HCT VFR BLD AUTO: 41.5 % (ref 36–48)
HGB BLD-MCNC: 13.8 G/DL (ref 12–16)
MCH RBC QN AUTO: 30.9 PG (ref 26–34)
MCHC RBC AUTO-ENTMCNC: 33.3 G/DL (ref 31–36)
MCV RBC AUTO: 93 FL (ref 80–100)
PLATELET # BLD AUTO: 199 K/UL (ref 135–450)
PMV BLD AUTO: 6.6 FL (ref 5–10.5)
POTASSIUM SERPL-SCNC: 4.6 MMOL/L (ref 3.5–5.1)
RBC # BLD AUTO: 4.46 M/UL (ref 4–5.2)
SODIUM SERPL-SCNC: 140 MMOL/L (ref 136–145)
WBC # BLD AUTO: 8 K/UL (ref 4–11)

## 2023-11-01 PROCEDURE — 85027 COMPLETE CBC AUTOMATED: CPT

## 2023-11-01 PROCEDURE — 3700000001 HC ADD 15 MINUTES (ANESTHESIA): Performed by: INTERNAL MEDICINE

## 2023-11-01 PROCEDURE — 6360000002 HC RX W HCPCS: Performed by: NURSE ANESTHETIST, CERTIFIED REGISTERED

## 2023-11-01 PROCEDURE — 80048 BASIC METABOLIC PNL TOTAL CA: CPT

## 2023-11-01 PROCEDURE — 2500000003 HC RX 250 WO HCPCS: Performed by: ANESTHESIOLOGY

## 2023-11-01 PROCEDURE — 3609018500 HC EGD US SCOPE W/ADJACENT STRUCTURES: Performed by: INTERNAL MEDICINE

## 2023-11-01 PROCEDURE — 7100000011 HC PHASE II RECOVERY - ADDTL 15 MIN: Performed by: INTERNAL MEDICINE

## 2023-11-01 PROCEDURE — 7100000010 HC PHASE II RECOVERY - FIRST 15 MIN: Performed by: INTERNAL MEDICINE

## 2023-11-01 PROCEDURE — 2709999900 HC NON-CHARGEABLE SUPPLY: Performed by: INTERNAL MEDICINE

## 2023-11-01 PROCEDURE — 36415 COLL VENOUS BLD VENIPUNCTURE: CPT

## 2023-11-01 PROCEDURE — 2500000003 HC RX 250 WO HCPCS: Performed by: NURSE ANESTHETIST, CERTIFIED REGISTERED

## 2023-11-01 PROCEDURE — 6360000002 HC RX W HCPCS

## 2023-11-01 PROCEDURE — 3700000000 HC ANESTHESIA ATTENDED CARE: Performed by: INTERNAL MEDICINE

## 2023-11-01 RX ORDER — OXYCODONE HYDROCHLORIDE 5 MG/1
10 TABLET ORAL PRN
Status: DISCONTINUED | OUTPATIENT
Start: 2023-11-01 | End: 2023-11-01 | Stop reason: HOSPADM

## 2023-11-01 RX ORDER — SODIUM CHLORIDE 0.9 % (FLUSH) 0.9 %
5-40 SYRINGE (ML) INJECTION EVERY 12 HOURS SCHEDULED
Status: DISCONTINUED | OUTPATIENT
Start: 2023-11-01 | End: 2023-11-01 | Stop reason: HOSPADM

## 2023-11-01 RX ORDER — GLYCOPYRROLATE 0.2 MG/ML
INJECTION INTRAMUSCULAR; INTRAVENOUS PRN
Status: DISCONTINUED | OUTPATIENT
Start: 2023-11-01 | End: 2023-11-01 | Stop reason: SDUPTHER

## 2023-11-01 RX ORDER — LIDOCAINE HYDROCHLORIDE 20 MG/ML
INJECTION, SOLUTION INFILTRATION; PERINEURAL PRN
Status: DISCONTINUED | OUTPATIENT
Start: 2023-11-01 | End: 2023-11-01 | Stop reason: SDUPTHER

## 2023-11-01 RX ORDER — SODIUM CHLORIDE 9 MG/ML
INJECTION, SOLUTION INTRAVENOUS PRN
Status: DISCONTINUED | OUTPATIENT
Start: 2023-11-01 | End: 2023-11-01 | Stop reason: HOSPADM

## 2023-11-01 RX ORDER — FENTANYL CITRATE 50 UG/ML
INJECTION, SOLUTION INTRAMUSCULAR; INTRAVENOUS PRN
Status: DISCONTINUED | OUTPATIENT
Start: 2023-11-01 | End: 2023-11-01 | Stop reason: SDUPTHER

## 2023-11-01 RX ORDER — SODIUM CHLORIDE, SODIUM LACTATE, POTASSIUM CHLORIDE, CALCIUM CHLORIDE 600; 310; 30; 20 MG/100ML; MG/100ML; MG/100ML; MG/100ML
INJECTION, SOLUTION INTRAVENOUS CONTINUOUS
Status: DISCONTINUED | OUTPATIENT
Start: 2023-11-01 | End: 2023-11-01 | Stop reason: HOSPADM

## 2023-11-01 RX ORDER — MEPERIDINE HYDROCHLORIDE 25 MG/ML
12.5 INJECTION INTRAMUSCULAR; INTRAVENOUS; SUBCUTANEOUS EVERY 5 MIN PRN
Status: DISCONTINUED | OUTPATIENT
Start: 2023-11-01 | End: 2023-11-01 | Stop reason: HOSPADM

## 2023-11-01 RX ORDER — ONDANSETRON 2 MG/ML
4 INJECTION INTRAMUSCULAR; INTRAVENOUS
Status: COMPLETED | OUTPATIENT
Start: 2023-11-01 | End: 2023-11-01

## 2023-11-01 RX ORDER — ONDANSETRON 2 MG/ML
INJECTION INTRAMUSCULAR; INTRAVENOUS
Status: COMPLETED
Start: 2023-11-01 | End: 2023-11-01

## 2023-11-01 RX ORDER — MIDAZOLAM HYDROCHLORIDE 1 MG/ML
INJECTION INTRAMUSCULAR; INTRAVENOUS PRN
Status: DISCONTINUED | OUTPATIENT
Start: 2023-11-01 | End: 2023-11-01 | Stop reason: SDUPTHER

## 2023-11-01 RX ORDER — SODIUM CHLORIDE 0.9 % (FLUSH) 0.9 %
5-40 SYRINGE (ML) INJECTION PRN
Status: DISCONTINUED | OUTPATIENT
Start: 2023-11-01 | End: 2023-11-01 | Stop reason: HOSPADM

## 2023-11-01 RX ORDER — FAMOTIDINE 10 MG/ML
20 INJECTION, SOLUTION INTRAVENOUS ONCE
Status: COMPLETED | OUTPATIENT
Start: 2023-11-01 | End: 2023-11-01

## 2023-11-01 RX ORDER — OXYCODONE HYDROCHLORIDE 5 MG/1
5 TABLET ORAL PRN
Status: DISCONTINUED | OUTPATIENT
Start: 2023-11-01 | End: 2023-11-01 | Stop reason: HOSPADM

## 2023-11-01 RX ORDER — PROPOFOL 10 MG/ML
INJECTION, EMULSION INTRAVENOUS CONTINUOUS PRN
Status: DISCONTINUED | OUTPATIENT
Start: 2023-11-01 | End: 2023-11-01 | Stop reason: SDUPTHER

## 2023-11-01 RX ADMIN — MIDAZOLAM 1 MG: 1 INJECTION INTRAMUSCULAR; INTRAVENOUS at 10:16

## 2023-11-01 RX ADMIN — FAMOTIDINE 20 MG: 10 INJECTION, SOLUTION INTRAVENOUS at 10:03

## 2023-11-01 RX ADMIN — ONDANSETRON 4 MG: 2 INJECTION INTRAMUSCULAR; INTRAVENOUS at 10:49

## 2023-11-01 RX ADMIN — PROPOFOL 125 MCG/KG/MIN: 10 INJECTION, EMULSION INTRAVENOUS at 10:15

## 2023-11-01 RX ADMIN — FENTANYL CITRATE 50 MCG: 50 INJECTION INTRAMUSCULAR; INTRAVENOUS at 10:14

## 2023-11-01 RX ADMIN — LIDOCAINE HYDROCHLORIDE 60 MG: 20 INJECTION, SOLUTION INFILTRATION; PERINEURAL at 10:16

## 2023-11-01 RX ADMIN — GLYCOPYRROLATE 0.1 MG: 0.2 INJECTION, SOLUTION INTRAMUSCULAR; INTRAVENOUS at 10:14

## 2023-11-01 ASSESSMENT — PAIN - FUNCTIONAL ASSESSMENT: PAIN_FUNCTIONAL_ASSESSMENT: NONE - DENIES PAIN

## 2023-11-01 ASSESSMENT — LIFESTYLE VARIABLES: SMOKING_STATUS: 0

## 2023-11-01 ASSESSMENT — ENCOUNTER SYMPTOMS: SHORTNESS OF BREATH: 0

## 2023-11-01 NOTE — BRIEF OP NOTE
Brief Postoperative Note      Patient: Torey Osuna  YOB: 1960  MRN: [id number]    Date of Procedure: 11/1/2023    Pre-Op Diagnosis Codes:     * Low fecal elastase level [R19.5]    Post-Op Diagnosis:  normal pancreas       Procedure(s):  UPPER EUS W/ANES.  (9:30)    Surgeon(s):  Regino Benedict MD    Assistant:  * No surgical staff found *    Anesthesia: General    Estimated Blood Loss (mL): Minimal    Complications: None    Specimens:   * No specimens in log *    Implants:  * No implants in log *      Drains: * No LDAs found *    Findings: normal pancreas      Electronically signed by Regino Benedict MD on 11/1/2023 at 11:40 AM

## 2023-11-01 NOTE — PROGRESS NOTES
Pt. Arrived to PACU, report received from [facility] and [address]. Pt in phase II. Pt. Placed on monitor for cont. Blood pressures. No O2 requirements at this time. Pt. 94% on RA.  Fitz Otero RN

## 2023-11-01 NOTE — OP NOTE
pancreatitis, cystic neoplasm, or malignancy. 3.  No explanation for diarrhea on this exam.    RECOMMENDATIONS:  1. Discharge the patient to home when standard parameters are met. 2.  Follow up with Dr. Riky Ascencio for further management.     EBL: <5mL    Melvin Beck MD    D: 11/01/2023 10:54:53       T: 11/01/2023 10:57:39     [id number]  Job#: [id number]     Doc#: [id number]    CC:  _____ MD Tk Andrade MD

## 2023-11-01 NOTE — DISCHARGE INSTRUCTIONS
PATIENT INSTRUCTIONS  POST-SEDATION    Macy Bauer          IMMEDIATELY FOLLOWING PROCEDURE:    Do not drive or operate machinery for the first twenty four hours after surgery. Do not make any important decisions for twenty four hours after surgery or while taking narcotic pain medications or sedatives. You should NOT BE LEFT UNATTENDED OR ALONE. A responsible adult should be with you for the rest of the day of your procedure and also during the night for your protection and safety. You may experience some light headedness. Rest at home with activity as tolerated. You may not need to go to bed, but it is important to rest for the next 24 hours. You should not engage in athletic sports such as basketball, volleyball, jogging, skating, or activities requiring refined motor skills for 24 hours. If you develop intractable nausea and vomiting or a severe headache please notify your doctor immediately. You are not expected to have any fever, but if you feel warm, take your temperature. If you have a fever 101 degrees or higher, call your doctor. If you have had an Endoscopy:   *Eat lightly for your first meal and gradually resume your normal / prescribed diet. DO NOT eat or drink until your gag reflex returns. *If you have a sore throat you may use lozenges, or salt water gargles. *If you have had a colonoscopy, do not expect a normal bowel movement for approximately three days due to the cleansing of the large intestine prior to colonoscopy. ONCE YOU ARE HOME, IF YOU SHOULD HAVE:  Difficulty in breathing, persistent nausea or vomiting, bleeding you feel is excessive, or pain that is unusual, increased abdominal bloating, or any swelling, fever / chills, call your physician. If you cannot contact your physician, but feel that your signs and symptoms need a physician's attention, go to the Emergency Department. FOLLOW-UP:    Please follow up with Dr. Awa Wade as scheduled or needed.

## 2023-11-01 NOTE — H&P
History and Physical / Pre-Sedation Assessment    Patient:  Светлана Griffith   :   1960     Intended Procedure:  EUS    HPI: 58year old female with chronic diarrhea recently found to have low fecal elastase    Past Medical History:   Diagnosis Date    Anticoagulant long-term use     Anxiety     Arthritis     Carcinoid syndrome ([address]) 10/19/2022    Cerebrovascular disease 2014    stroke    Depression     Endometriosis     Fibromyalgia     Generalized osteoarthritis 3/6/2015    GERD (gastroesophageal reflux disease)     H/O suicide attempt     Headache(784.0)     Hyperlipidemia     Hypertension     Hypothyroidism     Irritable bowel syndrome     Migraines     Obesity     Patent foramen ovale 2014    PONV (postoperative nausea and vomiting)      Past Surgical History:   Procedure Laterality Date    CHOLECYSTECTOMY      laparoscopic    COLONOSCOPY      COLONOSCOPY  2017    random biopsies    COLONOSCOPY  2017    polyp    HYSTERECTOMY (CERVIX STATUS UNKNOWN)      total vaginal hysterectomy, retains both ovaries, menorrhagia, fibroid tumors    JOINT REPLACEMENT Left 2018    LTK     KNEE CARTILAGE SURGERY Right     KNEE SURGERY      THYROIDECTOMY         Medications reviewed  Prior to Admission medications    Medication Sig Start Date End Date Taking?  Authorizing Provider   memantine (NAMENDA) 10 MG tablet Take 1 tablet by mouth 2 times daily 3/28/22  Yes Lopez Simpson MD   lisinopril (PRINIVIL;ZESTRIL) 40 MG tablet Take 1 tablet by mouth daily 22  Yes ProviderLopez MD   levothyroxine (SYNTHROID) 125 MCG tablet TAKE 1 TABLET BY MOUTH EVERY DAY  Patient taking differently: Take 100 mcg by mouth Daily 12/10/20   Sriram Rhoades MD   atenolol (TENORMIN) 25 MG tablet Take 4 tablets by mouth in the morning and at bedtime 50mgs in morning and 25mgs in evening    ProviderLopez MD   rosuvastatin (CRESTOR) 40 MG tablet Take 1 tablet by mouth every evening

## 2023-11-08 ENCOUNTER — HOSPITAL ENCOUNTER (OUTPATIENT)
Age: 63
Discharge: HOME | End: 2023-11-08
Payer: MEDICARE

## 2023-11-08 PROCEDURE — 83519 RIA NONANTIBODY: CPT

## 2023-11-10 LAB — MISCELLANEOUS LAB TEST ORDER: NORMAL

## 2024-02-28 ENCOUNTER — APPOINTMENT (OUTPATIENT)
Dept: GENERAL RADIOLOGY | Age: 64
End: 2024-02-28
Payer: MEDICARE

## 2024-02-28 ENCOUNTER — APPOINTMENT (OUTPATIENT)
Dept: CT IMAGING | Age: 64
End: 2024-02-28
Payer: MEDICARE

## 2024-02-28 ENCOUNTER — HOSPITAL ENCOUNTER (INPATIENT)
Age: 64
LOS: 1 days | Discharge: HOME | End: 2024-03-01
Attending: EMERGENCY MEDICINE | Admitting: INTERNAL MEDICINE
Payer: MEDICARE

## 2024-02-28 DIAGNOSIS — N17.9 AKI (ACUTE KIDNEY INJURY) (HCC): ICD-10-CM

## 2024-02-28 DIAGNOSIS — E86.0 DEHYDRATION: ICD-10-CM

## 2024-02-28 DIAGNOSIS — R55 SYNCOPE AND COLLAPSE: ICD-10-CM

## 2024-02-28 DIAGNOSIS — N30.00 ACUTE CYSTITIS WITHOUT HEMATURIA: Primary | ICD-10-CM

## 2024-02-28 DIAGNOSIS — A41.9 SEPSIS, DUE TO UNSPECIFIED ORGANISM, UNSPECIFIED WHETHER ACUTE ORGAN DYSFUNCTION PRESENT (HCC): ICD-10-CM

## 2024-02-28 LAB
ALBUMIN SERPL-MCNC: 4.3 G/DL (ref 3.4–5)
ALBUMIN/GLOB SERPL: 1.7 {RATIO} (ref 1.1–2.2)
ALP SERPL-CCNC: 101 U/L (ref 40–129)
ALT SERPL-CCNC: 18 U/L (ref 10–40)
ANION GAP SERPL CALCULATED.3IONS-SCNC: 17 MMOL/L (ref 3–16)
AST SERPL-CCNC: 31 U/L (ref 15–37)
BASOPHILS # BLD: 0.1 K/UL (ref 0–0.2)
BASOPHILS NFR BLD: 1 %
BILIRUB SERPL-MCNC: <0.2 MG/DL (ref 0–1)
BUN SERPL-MCNC: 39 MG/DL (ref 7–20)
CALCIUM SERPL-MCNC: 9.5 MG/DL (ref 8.3–10.6)
CHLORIDE SERPL-SCNC: 96 MMOL/L (ref 99–110)
CO2 SERPL-SCNC: 19 MMOL/L (ref 21–32)
CREAT SERPL-MCNC: 2.1 MG/DL (ref 0.6–1.2)
DEPRECATED RDW RBC AUTO: 14.5 % (ref 12.4–15.4)
EOSINOPHIL # BLD: 0.1 K/UL (ref 0–0.6)
EOSINOPHIL NFR BLD: 1.4 %
GFR SERPLBLD CREATININE-BSD FMLA CKD-EPI: 26 ML/MIN/{1.73_M2}
GLUCOSE SERPL-MCNC: 99 MG/DL (ref 70–99)
HCT VFR BLD AUTO: 39.5 % (ref 36–48)
HGB BLD-MCNC: 13.3 G/DL (ref 12–16)
LACTATE BLDV-SCNC: 3.7 MMOL/L (ref 0.4–1.9)
LYMPHOCYTES # BLD: 3 K/UL (ref 1–5.1)
LYMPHOCYTES NFR BLD: 34.7 %
MCH RBC QN AUTO: 31.3 PG (ref 26–34)
MCHC RBC AUTO-ENTMCNC: 33.7 G/DL (ref 31–36)
MCV RBC AUTO: 92.9 FL (ref 80–100)
MONOCYTES # BLD: 0.6 K/UL (ref 0–1.3)
MONOCYTES NFR BLD: 7.3 %
NEUTROPHILS # BLD: 4.9 K/UL (ref 1.7–7.7)
NEUTROPHILS NFR BLD: 55.6 %
PLATELET # BLD AUTO: 275 K/UL (ref 135–450)
PMV BLD AUTO: 7.1 FL (ref 5–10.5)
POTASSIUM SERPL-SCNC: 4.3 MMOL/L (ref 3.5–5.1)
PROT SERPL-MCNC: 6.9 G/DL (ref 6.4–8.2)
RBC # BLD AUTO: 4.25 M/UL (ref 4–5.2)
SODIUM SERPL-SCNC: 132 MMOL/L (ref 136–145)
WBC # BLD AUTO: 8.8 K/UL (ref 4–11)

## 2024-02-28 PROCEDURE — 93005 ELECTROCARDIOGRAM TRACING: CPT | Performed by: EMERGENCY MEDICINE

## 2024-02-28 PROCEDURE — 87186 SC STD MICRODIL/AGAR DIL: CPT

## 2024-02-28 PROCEDURE — 87086 URINE CULTURE/COLONY COUNT: CPT

## 2024-02-28 PROCEDURE — 96361 HYDRATE IV INFUSION ADD-ON: CPT

## 2024-02-28 PROCEDURE — 71045 X-RAY EXAM CHEST 1 VIEW: CPT

## 2024-02-28 PROCEDURE — 72125 CT NECK SPINE W/O DYE: CPT

## 2024-02-28 PROCEDURE — 70450 CT HEAD/BRAIN W/O DYE: CPT

## 2024-02-28 PROCEDURE — 96375 TX/PRO/DX INJ NEW DRUG ADDON: CPT

## 2024-02-28 PROCEDURE — 80053 COMPREHEN METABOLIC PANEL: CPT

## 2024-02-28 PROCEDURE — 2580000003 HC RX 258: Performed by: EMERGENCY MEDICINE

## 2024-02-28 PROCEDURE — 99285 EMERGENCY DEPT VISIT HI MDM: CPT

## 2024-02-28 PROCEDURE — 83605 ASSAY OF LACTIC ACID: CPT

## 2024-02-28 PROCEDURE — 96365 THER/PROPH/DIAG IV INF INIT: CPT

## 2024-02-28 PROCEDURE — 87088 URINE BACTERIA CULTURE: CPT

## 2024-02-28 PROCEDURE — 85025 COMPLETE CBC W/AUTO DIFF WBC: CPT

## 2024-02-28 PROCEDURE — 81001 URINALYSIS AUTO W/SCOPE: CPT

## 2024-02-28 RX ORDER — 0.9 % SODIUM CHLORIDE 0.9 %
1000 INTRAVENOUS SOLUTION INTRAVENOUS ONCE
Status: COMPLETED | OUTPATIENT
Start: 2024-02-28 | End: 2024-02-29

## 2024-02-28 RX ADMIN — SODIUM CHLORIDE 1000 ML: 9 INJECTION, SOLUTION INTRAVENOUS at 22:57

## 2024-02-28 RX ADMIN — SODIUM CHLORIDE 1000 ML: 9 INJECTION, SOLUTION INTRAVENOUS at 22:17

## 2024-02-28 ASSESSMENT — LIFESTYLE VARIABLES
HOW MANY STANDARD DRINKS CONTAINING ALCOHOL DO YOU HAVE ON A TYPICAL DAY: 1 OR 2
HOW OFTEN DO YOU HAVE A DRINK CONTAINING ALCOHOL: MONTHLY OR LESS

## 2024-02-28 ASSESSMENT — PAIN DESCRIPTION - PAIN TYPE: TYPE: ACUTE PAIN

## 2024-02-28 ASSESSMENT — PAIN DESCRIPTION - ORIENTATION: ORIENTATION: LEFT

## 2024-02-28 ASSESSMENT — PAIN - FUNCTIONAL ASSESSMENT: PAIN_FUNCTIONAL_ASSESSMENT: 0-10

## 2024-02-28 ASSESSMENT — PAIN DESCRIPTION - DESCRIPTORS: DESCRIPTORS: ACHING

## 2024-02-28 ASSESSMENT — PAIN DESCRIPTION - FREQUENCY: FREQUENCY: CONTINUOUS

## 2024-02-28 ASSESSMENT — PAIN SCALES - GENERAL: PAINLEVEL_OUTOF10: 8

## 2024-02-28 ASSESSMENT — PAIN DESCRIPTION - LOCATION: LOCATION: FACE

## 2024-02-29 ENCOUNTER — APPOINTMENT (OUTPATIENT)
Dept: CT IMAGING | Age: 64
End: 2024-02-29
Payer: MEDICARE

## 2024-02-29 PROBLEM — R55 SYNCOPE AND COLLAPSE: Status: ACTIVE | Noted: 2024-02-29

## 2024-02-29 LAB
ANION GAP SERPL CALCULATED.3IONS-SCNC: 10 MMOL/L (ref 3–16)
BACTERIA URNS QL MICRO: ABNORMAL /HPF
BASOPHILS # BLD: 0 K/UL (ref 0–0.2)
BASOPHILS NFR BLD: 0.4 %
BILIRUB UR QL STRIP.AUTO: NEGATIVE
BUN SERPL-MCNC: 38 MG/DL (ref 7–20)
CALCIUM SERPL-MCNC: 8.6 MG/DL (ref 8.3–10.6)
CHLORIDE SERPL-SCNC: 107 MMOL/L (ref 99–110)
CLARITY UR: CLEAR
CO2 SERPL-SCNC: 22 MMOL/L (ref 21–32)
COLOR UR: YELLOW
CREAT SERPL-MCNC: 1.6 MG/DL (ref 0.6–1.2)
DEPRECATED RDW RBC AUTO: 14.5 % (ref 12.4–15.4)
EKG ATRIAL RATE: 65 BPM
EKG DIAGNOSIS: NORMAL
EKG P AXIS: 30 DEGREES
EKG P-R INTERVAL: 140 MS
EKG Q-T INTERVAL: 428 MS
EKG QRS DURATION: 84 MS
EKG QTC CALCULATION (BAZETT): 445 MS
EKG R AXIS: 30 DEGREES
EKG T AXIS: 38 DEGREES
EKG VENTRICULAR RATE: 65 BPM
EOSINOPHIL # BLD: 0.1 K/UL (ref 0–0.6)
EOSINOPHIL NFR BLD: 1.4 %
EPI CELLS #/AREA URNS HPF: ABNORMAL /HPF (ref 0–5)
GFR SERPLBLD CREATININE-BSD FMLA CKD-EPI: 36 ML/MIN/{1.73_M2}
GLUCOSE SERPL-MCNC: 76 MG/DL (ref 70–99)
GLUCOSE UR STRIP.AUTO-MCNC: NEGATIVE MG/DL
HCT VFR BLD AUTO: 35.5 % (ref 36–48)
HGB BLD-MCNC: 11.8 G/DL (ref 12–16)
HGB UR QL STRIP.AUTO: ABNORMAL
KETONES UR STRIP.AUTO-MCNC: NEGATIVE MG/DL
LACTATE BLDV-SCNC: 0.8 MMOL/L (ref 0.4–1.9)
LACTATE BLDV-SCNC: 2.6 MMOL/L (ref 0.4–1.9)
LEUKOCYTE ESTERASE UR QL STRIP.AUTO: ABNORMAL
LYMPHOCYTES # BLD: 2.5 K/UL (ref 1–5.1)
LYMPHOCYTES NFR BLD: 29.5 %
MCH RBC QN AUTO: 31 PG (ref 26–34)
MCHC RBC AUTO-ENTMCNC: 33.1 G/DL (ref 31–36)
MCV RBC AUTO: 93.5 FL (ref 80–100)
MONOCYTES # BLD: 0.8 K/UL (ref 0–1.3)
MONOCYTES NFR BLD: 9.3 %
NEUTROPHILS # BLD: 5 K/UL (ref 1.7–7.7)
NEUTROPHILS NFR BLD: 59.4 %
NITRITE UR QL STRIP.AUTO: NEGATIVE
PH UR STRIP.AUTO: 5.5 [PH] (ref 5–8)
PLATELET # BLD AUTO: 206 K/UL (ref 135–450)
PMV BLD AUTO: 6.8 FL (ref 5–10.5)
POTASSIUM SERPL-SCNC: 4.5 MMOL/L (ref 3.5–5.1)
PROT UR STRIP.AUTO-MCNC: NEGATIVE MG/DL
RBC # BLD AUTO: 3.8 M/UL (ref 4–5.2)
RBC #/AREA URNS HPF: ABNORMAL /HPF (ref 0–4)
SODIUM SERPL-SCNC: 139 MMOL/L (ref 136–145)
SP GR UR STRIP.AUTO: 1.01 (ref 1–1.03)
TROPONIN, HIGH SENSITIVITY: 12 NG/L (ref 0–14)
TROPONIN, HIGH SENSITIVITY: 13 NG/L (ref 0–14)
UA COMPLETE W REFLEX CULTURE PNL UR: YES
UA DIPSTICK W REFLEX MICRO PNL UR: YES
URN SPEC COLLECT METH UR: ABNORMAL
UROBILINOGEN UR STRIP-ACNC: 0.2 E.U./DL
WBC # BLD AUTO: 8.4 K/UL (ref 4–11)
WBC #/AREA URNS HPF: ABNORMAL /HPF (ref 0–5)

## 2024-02-29 PROCEDURE — 6360000002 HC RX W HCPCS: Performed by: EMERGENCY MEDICINE

## 2024-02-29 PROCEDURE — 1200000000 HC SEMI PRIVATE

## 2024-02-29 PROCEDURE — 87040 BLOOD CULTURE FOR BACTERIA: CPT

## 2024-02-29 PROCEDURE — 2580000003 HC RX 258: Performed by: HOSPITALIST

## 2024-02-29 PROCEDURE — 6370000000 HC RX 637 (ALT 250 FOR IP): Performed by: NURSE PRACTITIONER

## 2024-02-29 PROCEDURE — 2060000000 HC ICU INTERMEDIATE R&B

## 2024-02-29 PROCEDURE — 6360000002 HC RX W HCPCS: Performed by: HOSPITALIST

## 2024-02-29 PROCEDURE — 36415 COLL VENOUS BLD VENIPUNCTURE: CPT

## 2024-02-29 PROCEDURE — 74176 CT ABD & PELVIS W/O CONTRAST: CPT

## 2024-02-29 PROCEDURE — 2580000003 HC RX 258: Performed by: EMERGENCY MEDICINE

## 2024-02-29 PROCEDURE — 84484 ASSAY OF TROPONIN QUANT: CPT

## 2024-02-29 PROCEDURE — 6370000000 HC RX 637 (ALT 250 FOR IP): Performed by: HOSPITALIST

## 2024-02-29 PROCEDURE — 83605 ASSAY OF LACTIC ACID: CPT

## 2024-02-29 PROCEDURE — 93010 ELECTROCARDIOGRAM REPORT: CPT | Performed by: INTERNAL MEDICINE

## 2024-02-29 PROCEDURE — 85025 COMPLETE CBC W/AUTO DIFF WBC: CPT

## 2024-02-29 PROCEDURE — 80048 BASIC METABOLIC PNL TOTAL CA: CPT

## 2024-02-29 PROCEDURE — 6370000000 HC RX 637 (ALT 250 FOR IP): Performed by: EMERGENCY MEDICINE

## 2024-02-29 RX ORDER — PROCHLORPERAZINE EDISYLATE 5 MG/ML
5 INJECTION INTRAMUSCULAR; INTRAVENOUS ONCE
Status: COMPLETED | OUTPATIENT
Start: 2024-02-29 | End: 2024-02-29

## 2024-02-29 RX ORDER — METOCLOPRAMIDE HYDROCHLORIDE 5 MG/ML
10 INJECTION INTRAMUSCULAR; INTRAVENOUS ONCE
Status: COMPLETED | OUTPATIENT
Start: 2024-02-29 | End: 2024-02-29

## 2024-02-29 RX ORDER — ONDANSETRON 4 MG/1
4 TABLET, ORALLY DISINTEGRATING ORAL EVERY 8 HOURS PRN
Status: DISCONTINUED | OUTPATIENT
Start: 2024-02-29 | End: 2024-03-01 | Stop reason: HOSPADM

## 2024-02-29 RX ORDER — SODIUM CHLORIDE, SODIUM LACTATE, POTASSIUM CHLORIDE, AND CALCIUM CHLORIDE .6; .31; .03; .02 G/100ML; G/100ML; G/100ML; G/100ML
500 INJECTION, SOLUTION INTRAVENOUS ONCE
Status: COMPLETED | OUTPATIENT
Start: 2024-02-29 | End: 2024-02-29

## 2024-02-29 RX ORDER — LISINOPRIL 20 MG/1
40 TABLET ORAL DAILY
Status: DISCONTINUED | OUTPATIENT
Start: 2024-02-29 | End: 2024-03-01 | Stop reason: HOSPADM

## 2024-02-29 RX ORDER — SODIUM CHLORIDE 9 MG/ML
INJECTION, SOLUTION INTRAVENOUS PRN
Status: DISCONTINUED | OUTPATIENT
Start: 2024-02-29 | End: 2024-03-01 | Stop reason: HOSPADM

## 2024-02-29 RX ORDER — CLOPIDOGREL BISULFATE 75 MG/1
75 TABLET ORAL DAILY
Status: DISCONTINUED | OUTPATIENT
Start: 2024-02-29 | End: 2024-03-01 | Stop reason: HOSPADM

## 2024-02-29 RX ORDER — ONDANSETRON 2 MG/ML
4 INJECTION INTRAMUSCULAR; INTRAVENOUS EVERY 6 HOURS PRN
Status: DISCONTINUED | OUTPATIENT
Start: 2024-02-29 | End: 2024-03-01 | Stop reason: HOSPADM

## 2024-02-29 RX ORDER — LEVOFLOXACIN 5 MG/ML
750 INJECTION, SOLUTION INTRAVENOUS ONCE
Status: COMPLETED | OUTPATIENT
Start: 2024-02-29 | End: 2024-02-29

## 2024-02-29 RX ORDER — DULOXETIN HYDROCHLORIDE 60 MG/1
60 CAPSULE, DELAYED RELEASE ORAL DAILY
Status: DISCONTINUED | OUTPATIENT
Start: 2024-02-29 | End: 2024-03-01 | Stop reason: HOSPADM

## 2024-02-29 RX ORDER — POLYETHYLENE GLYCOL 3350 17 G/17G
17 POWDER, FOR SOLUTION ORAL DAILY PRN
Status: DISCONTINUED | OUTPATIENT
Start: 2024-02-29 | End: 2024-03-01 | Stop reason: HOSPADM

## 2024-02-29 RX ORDER — LEVOTHYROXINE SODIUM 0.1 MG/1
100 TABLET ORAL DAILY
Status: DISCONTINUED | OUTPATIENT
Start: 2024-02-29 | End: 2024-03-01 | Stop reason: HOSPADM

## 2024-02-29 RX ORDER — ACETAMINOPHEN 650 MG/1
650 SUPPOSITORY RECTAL EVERY 6 HOURS PRN
Status: DISCONTINUED | OUTPATIENT
Start: 2024-02-29 | End: 2024-03-01 | Stop reason: HOSPADM

## 2024-02-29 RX ORDER — ONDANSETRON 2 MG/ML
4 INJECTION INTRAMUSCULAR; INTRAVENOUS ONCE
Status: COMPLETED | OUTPATIENT
Start: 2024-02-29 | End: 2024-02-29

## 2024-02-29 RX ORDER — SODIUM CHLORIDE 0.9 % (FLUSH) 0.9 %
5-40 SYRINGE (ML) INJECTION EVERY 12 HOURS SCHEDULED
Status: DISCONTINUED | OUTPATIENT
Start: 2024-02-29 | End: 2024-03-01 | Stop reason: HOSPADM

## 2024-02-29 RX ORDER — ROSUVASTATIN CALCIUM 10 MG/1
40 TABLET, COATED ORAL EVERY EVENING
Status: DISCONTINUED | OUTPATIENT
Start: 2024-02-29 | End: 2024-03-01 | Stop reason: HOSPADM

## 2024-02-29 RX ORDER — SODIUM CHLORIDE 0.9 % (FLUSH) 0.9 %
5-40 SYRINGE (ML) INJECTION PRN
Status: DISCONTINUED | OUTPATIENT
Start: 2024-02-29 | End: 2024-03-01 | Stop reason: HOSPADM

## 2024-02-29 RX ORDER — DIPHENHYDRAMINE HCL 25 MG
12.5 TABLET ORAL ONCE
Status: COMPLETED | OUTPATIENT
Start: 2024-02-29 | End: 2024-02-29

## 2024-02-29 RX ORDER — ENOXAPARIN SODIUM 100 MG/ML
40 INJECTION SUBCUTANEOUS DAILY
Status: DISCONTINUED | OUTPATIENT
Start: 2024-02-29 | End: 2024-03-01 | Stop reason: HOSPADM

## 2024-02-29 RX ORDER — DIPHENHYDRAMINE HYDROCHLORIDE 50 MG/ML
25 INJECTION INTRAMUSCULAR; INTRAVENOUS ONCE
Status: COMPLETED | OUTPATIENT
Start: 2024-02-29 | End: 2024-02-29

## 2024-02-29 RX ORDER — SODIUM CHLORIDE 9 MG/ML
1000 INJECTION, SOLUTION INTRAVENOUS CONTINUOUS
Status: DISCONTINUED | OUTPATIENT
Start: 2024-02-29 | End: 2024-03-01 | Stop reason: HOSPADM

## 2024-02-29 RX ORDER — ATENOLOL 25 MG/1
100 TABLET ORAL 2 TIMES DAILY
Status: DISCONTINUED | OUTPATIENT
Start: 2024-02-29 | End: 2024-03-01 | Stop reason: HOSPADM

## 2024-02-29 RX ORDER — LEVOFLOXACIN 5 MG/ML
750 INJECTION, SOLUTION INTRAVENOUS
Status: DISCONTINUED | OUTPATIENT
Start: 2024-03-02 | End: 2024-03-01 | Stop reason: HOSPADM

## 2024-02-29 RX ORDER — ACETAMINOPHEN 500 MG
1000 TABLET ORAL ONCE
Status: COMPLETED | OUTPATIENT
Start: 2024-02-29 | End: 2024-02-29

## 2024-02-29 RX ORDER — ACETAMINOPHEN 325 MG/1
650 TABLET ORAL EVERY 6 HOURS PRN
Status: DISCONTINUED | OUTPATIENT
Start: 2024-02-29 | End: 2024-03-01 | Stop reason: HOSPADM

## 2024-02-29 RX ORDER — MEMANTINE HYDROCHLORIDE 5 MG/1
10 TABLET ORAL 2 TIMES DAILY
Status: DISCONTINUED | OUTPATIENT
Start: 2024-02-29 | End: 2024-03-01 | Stop reason: HOSPADM

## 2024-02-29 RX ORDER — ENOXAPARIN SODIUM 100 MG/ML
30 INJECTION SUBCUTANEOUS DAILY
Status: DISCONTINUED | OUTPATIENT
Start: 2024-02-29 | End: 2024-02-29

## 2024-02-29 RX ORDER — UBROGEPANT 100 MG/1
1 TABLET ORAL DAILY PRN
COMMUNITY
Start: 2021-03-09

## 2024-02-29 RX ADMIN — SODIUM CHLORIDE 1000 ML: 9 INJECTION, SOLUTION INTRAVENOUS at 12:47

## 2024-02-29 RX ADMIN — DIPHENHYDRAMINE HYDROCHLORIDE 12.5 MG: 25 TABLET ORAL at 14:40

## 2024-02-29 RX ADMIN — MEMANTINE 10 MG: 5 TABLET ORAL at 20:29

## 2024-02-29 RX ADMIN — DULOXETINE HYDROCHLORIDE 60 MG: 60 CAPSULE, DELAYED RELEASE ORAL at 08:56

## 2024-02-29 RX ADMIN — MEMANTINE 10 MG: 5 TABLET ORAL at 08:56

## 2024-02-29 RX ADMIN — CLOPIDOGREL BISULFATE 75 MG: 75 TABLET ORAL at 08:56

## 2024-02-29 RX ADMIN — ACETAMINOPHEN 325MG 650 MG: 325 TABLET ORAL at 10:48

## 2024-02-29 RX ADMIN — SODIUM CHLORIDE 1000 ML: 9 INJECTION, SOLUTION INTRAVENOUS at 21:58

## 2024-02-29 RX ADMIN — LEVOTHYROXINE SODIUM 100 MCG: 0.1 TABLET ORAL at 05:17

## 2024-02-29 RX ADMIN — METOCLOPRAMIDE HYDROCHLORIDE 10 MG: 5 INJECTION INTRAMUSCULAR; INTRAVENOUS at 04:17

## 2024-02-29 RX ADMIN — PROCHLORPERAZINE EDISYLATE 5 MG: 5 INJECTION INTRAMUSCULAR; INTRAVENOUS at 14:40

## 2024-02-29 RX ADMIN — SODIUM CHLORIDE 1000 ML: 9 INJECTION, SOLUTION INTRAVENOUS at 02:42

## 2024-02-29 RX ADMIN — ROSUVASTATIN 40 MG: 10 TABLET, FILM COATED ORAL at 17:21

## 2024-02-29 RX ADMIN — ENOXAPARIN SODIUM 40 MG: 100 INJECTION SUBCUTANEOUS at 08:56

## 2024-02-29 RX ADMIN — LEVOFLOXACIN 750 MG: 5 INJECTION, SOLUTION INTRAVENOUS at 01:11

## 2024-02-29 RX ADMIN — DIPHENHYDRAMINE HYDROCHLORIDE 25 MG: 50 INJECTION INTRAMUSCULAR; INTRAVENOUS at 04:16

## 2024-02-29 RX ADMIN — ACETAMINOPHEN 1000 MG: 500 TABLET ORAL at 04:16

## 2024-02-29 RX ADMIN — SODIUM CHLORIDE, POTASSIUM CHLORIDE, SODIUM LACTATE AND CALCIUM CHLORIDE 500 ML: 600; 310; 30; 20 INJECTION, SOLUTION INTRAVENOUS at 16:15

## 2024-02-29 RX ADMIN — ONDANSETRON 4 MG: 2 INJECTION INTRAMUSCULAR; INTRAVENOUS at 02:12

## 2024-02-29 RX ADMIN — SODIUM CHLORIDE 1000 ML: 9 INJECTION, SOLUTION INTRAVENOUS at 04:56

## 2024-02-29 ASSESSMENT — PAIN DESCRIPTION - PAIN TYPE
TYPE: ACUTE PAIN
TYPE: ACUTE PAIN

## 2024-02-29 ASSESSMENT — PAIN DESCRIPTION - DESCRIPTORS
DESCRIPTORS: ACHING
DESCRIPTORS: ACHING

## 2024-02-29 ASSESSMENT — PAIN DESCRIPTION - FREQUENCY
FREQUENCY: CONTINUOUS
FREQUENCY: CONTINUOUS

## 2024-02-29 ASSESSMENT — PAIN SCALES - GENERAL
PAINLEVEL_OUTOF10: 3
PAINLEVEL_OUTOF10: 8
PAINLEVEL_OUTOF10: 3

## 2024-02-29 ASSESSMENT — PAIN - FUNCTIONAL ASSESSMENT
PAIN_FUNCTIONAL_ASSESSMENT: ACTIVITIES ARE NOT PREVENTED
PAIN_FUNCTIONAL_ASSESSMENT: PREVENTS OR INTERFERES SOME ACTIVE ACTIVITIES AND ADLS

## 2024-02-29 ASSESSMENT — PAIN DESCRIPTION - ONSET
ONSET: ON-GOING
ONSET: ON-GOING

## 2024-02-29 ASSESSMENT — PAIN DESCRIPTION - LOCATION
LOCATION: HEAD
LOCATION: HEAD

## 2024-02-29 ASSESSMENT — PAIN DESCRIPTION - ORIENTATION
ORIENTATION: MID
ORIENTATION: MID

## 2024-02-29 NOTE — PLAN OF CARE
Problem: Safety - Adult  Goal: Free from fall injury  Outcome: Progressing   Patient remains free from falls during this shift. Oriented to call light. Verbalizes understanding. Alert and oriented x4. Bedside table and call light within reach.  Bed in lowest position, brakes locked. Nonskid footwear in place. Will continue to monitor.

## 2024-02-29 NOTE — ED PROVIDER NOTES
Emergency Department Provider Note  Location: [facility]  ED  2/28/2024     Patient Identification  Deandra Whipple is a 63 y.o. female    Chief Complaint  Loss of Consciousness (Came by squad from home, states she was sitting on the couch and started feeling dizzy, states she got up to go lay down and woke up on the floor. Pt hit face on something but unsure of what. Hx stroke about 10 years ago. States this hasd happened before. Just got over a stomach virus 2 weeks ago. On plavix.) and Head Injury (Passed out and hit face on something. )          HPI  (History provided by patient)  Patient is a 63-year-old female who presents after a syncopal episode that occurred just prior to arrival.  Patient reports that she felt a little lightheaded when she was sitting down this evening she had a late dinner she took her evening medications prior to dinner as she normally does.  Felt lightheaded and decided she was going to get up and walk to her bed so she could lay down however after getting up and walking a few steps became more lightheaded and syncopized.  She fell and hit her head.  She does not recall the event.   did not witness fall but was present when she came back to consciousness and no reported seizure activity.  No postictal state.  Patient denies any chest pain shortness of breath palpitations prior to the event.  She reports that she recently got over a \"stomach virus\" 2 weeks ago and has been symptom-free for the past week.      Nursing Notes were all reviewed and agreed with, or any disagreements were addressed in the HPI:  Allergies:   Allergies   Allergen Reactions    Latex Other (See Comments)     Turns skin really red with use of latex gloves    Codeine Hives     Pt can take Norco with Benadryl    Ketorolac     Ketorolac Tromethamine     Kiwi Extract Itching    Nsaids Other (See Comments)     \"tear up my stomach\"    Penicillins Hives    Tolmetin        Past medical history:  has a  0111 115/64 -- 78 16 98 % -- -- -- --   02/29/24 0157 102/60 -- 77 -- 96 % -- -- -- --   02/29/24 0212 105/62 -- 81 -- 96 % -- -- -- --      Recent Labs     02/28/24  2200   WBC 8.8   CREATININE 2.1*   BILITOT <0.2            Time Severe Sepsis Identified: Lactic acid result time    Fluid Resuscitation Rational: at least 30mL/kg based on ideal body weight due to obesity defined as BMI >30 (patient's BMI is Body mass index is 30.23 kg/m². and IBW is Ideal body weight: 47.8 kg (105 lb 6.1 oz)Adjusted ideal body weight: 57.7 kg (127 lb 3.7 oz))      Repeat lactate level: improving    Reassessment Exam:   I have reassessed tissue perfusion and hemodynamic status after fluid bolus at this time: Improved, hypotension resolved    - Patient was given    ED Medication Orders (From admission, onward)      Start Ordered     Status Ordering Provider    02/29/24 0215 02/29/24 0209  ondansetron (ZOFRAN) injection 4 mg  ONCE         Last MAR action: Given - by RUSTY OG on 02/29/24 at 0212 AARON LUND    02/29/24 0045 02/29/24 0032  levoFLOXacin (LEVAQUIN) 750 MG/150ML infusion 750 mg  ONCE        Question:  Antimicrobial Indications  Answer:  Urinary Tract Infection    Last MAR action: New Bag - by RUSTY OG on 02/29/24 at 0111 AARON LUND    02/28/24 2230 02/28/24 2218  sodium chloride 0.9 % bolus 1,000 mL  ONCE         Last MAR action: Stopped - by RUSTY OG on 02/29/24 at 0031 AARON LUND    02/28/24 2215 02/28/24 2209  sodium chloride 0.9 % bolus 1,000 mL  ONCE         Last MAR action: Stopped - by RUSTY OG on 02/29/24 at 0031 AARON LUND              - I discussed the results with patient/family including incidental findings.  - At this point I do not see indication for further work-up in the ER, as it is unlikely  and poses more risk than benefit.  - Follow up plan and return precautions also discussed.  Patient/family verbalized understanding of

## 2024-02-29 NOTE — H&P
vomiting)      Past Surgical History:        Procedure Laterality Date    CHOLECYSTECTOMY  1990's    laparoscopic    COLONOSCOPY  2014    COLONOSCOPY  06/29/2017    random biopsies    COLONOSCOPY  01/23/2017    polyp    HYSTERECTOMY (CERVIX STATUS UNKNOWN)  1980's    total vaginal hysterectomy, retains both ovaries, menorrhagia, fibroid tumors    JOINT REPLACEMENT Left 06/28/2018    LTK     KNEE CARTILAGE SURGERY Right     KNEE SURGERY      THYROIDECTOMY      UPPER GASTROINTESTINAL ENDOSCOPY N/A 11/1/2023    UPPER EUS W/ANES. (9:30) [facility]     Medications Prior to Admission:   Prior to Admission medications    Medication Sig Start Date End Date Taking? Authorizing Provider   memantine (NAMENDA) 10 MG tablet Take 1 tablet by mouth 2 times daily 3/28/22   Lopez Simpson MD   lisinopril (PRINIVIL;ZESTRIL) 40 MG tablet Take 1 tablet by mouth daily 8/25/22   Lopez Simpson MD   levothyroxine (SYNTHROID) 125 MCG tablet TAKE 1 TABLET BY MOUTH EVERY DAY  Patient taking differently: Take 100 mcg by mouth Daily 12/10/20   Anupam Alexander MD   atenolol (TENORMIN) 25 MG tablet Take 4 tablets by mouth in the morning and at bedtime 50mgs in morning and 25mgs in evening    Lopez Simpson MD   rosuvastatin (CRESTOR) 40 MG tablet Take 1 tablet by mouth every evening    Lopez Simpson MD   clopidogrel (PLAVIX) 75 MG tablet Take 1 tablet by mouth daily    Lopez Simpson MD   promethazine (PHENERGAN) 25 MG tablet Take 1 tablet by mouth every 6 hours as needed for Nausea    Lopez Simpson MD   DULoxetine (CYMBALTA) 60 MG capsule Take 2 capsules by mouth daily  Patient taking differently: Take 1 capsule by mouth daily 6/28/16   Rubin Oswald MD   Cholecalciferol (VITAMIN D) 2000 UNITS CAPS capsule Take 1 capsule by mouth daily    ProviderLopez MD     Labs: Personally reviewed and interpreted for clinical significance.    Recent Labs     02/28/24 2200   WBC 8.8   HGB 13.3   HCT 39.5        Recent Labs     02/28/24 2200   *   K 4.3   CL 96*   CO2 19*   BUN 39*   CREATININE 2.1*   CALCIUM 9.5     Recent Labs     02/28/24 2200   AST 31   ALT 18   BILITOT <0.2   ALKPHOS 101     Thank you Orlando Bsuh MD for the opportunity to be involved in this patient's care. If you have any questions or concerns please feel free to contact me at [phone].     Brittnee Crane, EVANGELINA - ERICA  ------------------Anticipated Dr. Earnest galvan-----------------------

## 2024-02-29 NOTE — PROGRESS NOTES
Vitals:    02/29/24 1603   BP: (!) 87/57   Pulse: 71   Resp: 18   Temp: 98.2 °F (36.8 °C)   SpO2: 98%        Orders for 500 mL LR bolus given per MD Earnest.

## 2024-02-29 NOTE — PROGRESS NOTES
Paged Royce CNP regarding pt's BP this morning. Pt denies symptoms.     Notified MD Earnest. No new orders at this time. Holding scheduled BP meds.

## 2024-02-29 NOTE — ED NOTES
Patient assisted to bedside commode to urinate. Patient tolerated well and did not get dizzy. Urine sample sent to lab. Patient back in bed and call light within reach.

## 2024-03-01 VITALS
HEIGHT: 61 IN | OXYGEN SATURATION: 97 % | WEIGHT: 164.3 LBS | BODY MASS INDEX: 31.02 KG/M2 | RESPIRATION RATE: 18 BRPM | HEART RATE: 72 BPM | TEMPERATURE: 98.1 F | SYSTOLIC BLOOD PRESSURE: 113 MMHG | DIASTOLIC BLOOD PRESSURE: 66 MMHG

## 2024-03-01 LAB
ALBUMIN SERPL-MCNC: 3.8 G/DL (ref 3.4–5)
ANION GAP SERPL CALCULATED.3IONS-SCNC: 13 MMOL/L (ref 3–16)
BACTERIA BLD CULT ORG #2: NORMAL
BACTERIA BLD CULT: NORMAL
BACTERIA UR CULT: ABNORMAL
BASOPHILS # BLD: 0 K/UL (ref 0–0.2)
BASOPHILS NFR BLD: 0.5 %
BUN SERPL-MCNC: 17 MG/DL (ref 7–20)
CALCIUM SERPL-MCNC: 9.2 MG/DL (ref 8.3–10.6)
CHLORIDE SERPL-SCNC: 107 MMOL/L (ref 99–110)
CO2 SERPL-SCNC: 19 MMOL/L (ref 21–32)
CREAT SERPL-MCNC: 1.1 MG/DL (ref 0.6–1.2)
DEPRECATED RDW RBC AUTO: 14.4 % (ref 12.4–15.4)
EOSINOPHIL # BLD: 0.1 K/UL (ref 0–0.6)
EOSINOPHIL NFR BLD: 1.8 %
GFR SERPLBLD CREATININE-BSD FMLA CKD-EPI: 56 ML/MIN/{1.73_M2}
GLUCOSE SERPL-MCNC: 133 MG/DL (ref 70–99)
HCT VFR BLD AUTO: 38.6 % (ref 36–48)
HGB BLD-MCNC: 12.7 G/DL (ref 12–16)
LYMPHOCYTES # BLD: 1.8 K/UL (ref 1–5.1)
LYMPHOCYTES NFR BLD: 26.9 %
MCH RBC QN AUTO: 30.8 PG (ref 26–34)
MCHC RBC AUTO-ENTMCNC: 33 G/DL (ref 31–36)
MCV RBC AUTO: 93.2 FL (ref 80–100)
MONOCYTES # BLD: 0.4 K/UL (ref 0–1.3)
MONOCYTES NFR BLD: 6.4 %
NEUTROPHILS # BLD: 4.3 K/UL (ref 1.7–7.7)
NEUTROPHILS NFR BLD: 64.4 %
ORGANISM: ABNORMAL
PHOSPHATE SERPL-MCNC: 2.5 MG/DL (ref 2.5–4.9)
PLATELET # BLD AUTO: 218 K/UL (ref 135–450)
PMV BLD AUTO: 7 FL (ref 5–10.5)
POTASSIUM SERPL-SCNC: 4.1 MMOL/L (ref 3.5–5.1)
RBC # BLD AUTO: 4.14 M/UL (ref 4–5.2)
SODIUM SERPL-SCNC: 139 MMOL/L (ref 136–145)
WBC # BLD AUTO: 6.8 K/UL (ref 4–11)

## 2024-03-01 PROCEDURE — 36415 COLL VENOUS BLD VENIPUNCTURE: CPT

## 2024-03-01 PROCEDURE — 2580000003 HC RX 258: Performed by: NURSE PRACTITIONER

## 2024-03-01 PROCEDURE — 85025 COMPLETE CBC W/AUTO DIFF WBC: CPT

## 2024-03-01 PROCEDURE — 80069 RENAL FUNCTION PANEL: CPT

## 2024-03-01 PROCEDURE — 6370000000 HC RX 637 (ALT 250 FOR IP): Performed by: NURSE PRACTITIONER

## 2024-03-01 RX ORDER — CEPHALEXIN 500 MG/1
500 CAPSULE ORAL 3 TIMES DAILY
Qty: 9 CAPSULE | Refills: 0 | Status: SHIPPED | OUTPATIENT
Start: 2024-03-01 | End: 2024-03-04

## 2024-03-01 RX ADMIN — CLOPIDOGREL BISULFATE 75 MG: 75 TABLET ORAL at 08:42

## 2024-03-01 RX ADMIN — ATENOLOL 100 MG: 25 TABLET ORAL at 08:43

## 2024-03-01 RX ADMIN — MEMANTINE 10 MG: 5 TABLET ORAL at 08:42

## 2024-03-01 RX ADMIN — DULOXETINE HYDROCHLORIDE 60 MG: 60 CAPSULE, DELAYED RELEASE ORAL at 08:42

## 2024-03-01 RX ADMIN — SODIUM CHLORIDE, PRESERVATIVE FREE 10 ML: 5 INJECTION INTRAVENOUS at 08:45

## 2024-03-01 RX ADMIN — LEVOTHYROXINE SODIUM 100 MCG: 0.1 TABLET ORAL at 06:29

## 2024-03-01 NOTE — PROGRESS NOTES
Shift assessment completed and documented. Pt A/Ox4, VSS. Denies pain. Pt up to BR with SB assist. Pt in bed with call light within reach and bed alarm on. Pt denies needs at this time.

## 2024-03-01 NOTE — PROGRESS NOTES
Pt is A&Ox4. VSS. Shift assessment completed. Wants to go home, feels well. Call light within reach, bed in lowest position, wheels locked.

## 2024-03-01 NOTE — DISCHARGE SUMMARY
V2.0  Discharge Summary    Name:  Deandra Whipple /Age/Sex: 1960 (63 y.o. female)   Admit Date: 2024  Discharge Date:  3/1/24     MRN & CSN:  [id number] & [id number] Encounter Date and Time 3/1/24 11:09 AM EST    Attending:  Magdaleno Ace MD Discharging Provider: Magdaleno Ace MD       Hospital Course:     Brief HPI:   Expand All Collapse All               Hospital Medicine History & Physical       Date of Admission: 2024     Date of Service:  Pt seen/examined on 2024      [x]Admitted to Inpatient with expected LOS greater than two midnights due to medical therapy.     []Placed in Observation status.     Chief Admission Complaint:  Loss of consciousness     Presenting Admission History:       63 y.o. female, with PMH of obesity, hypertension, hyperlipidemia and hypothyroidism, who presented to [facility] with LOC. History obtained from the patient and review of EMR.  The patient stated this evening she was sitting on the couch when she had a sudden onset of dizziness.  She stated she got up to go lay down and woke up on the floor.  Patient reported that she did hit her face when she fell, but is unsure on what.  She stated she has had this happen to her before and she had does have a history of a stroke 10 years ago.  Patient stated she did just get over a stomach virus 2 weeks ago.  Patient reports that she does not remember anything that happened and  did not witness the fall but was present when the patient regained consciousness.  There was no seizure activity reported.  Patient does not appear to be in postictal state. In the emergency department a CT head was obtained and revealed no acute intracranial abnormality.  Old left MCA territory infarction.  CT cervical spine was also obtained that revealed no acute abnormality of cervical spine.  Upon further workup, the patient was found to be in acute renal failure with a creatinine of 2.1 and GFR of 26.  The

## 2024-03-01 NOTE — PROGRESS NOTES
Pt provided with discharge instructions. All questions answered. Pt is leaving in stable condition.  to provide transportation.

## 2024-03-04 LAB
BACTERIA BLD CULT ORG #2: NORMAL
BACTERIA BLD CULT: NORMAL

## 2025-03-25 ENCOUNTER — OFFICE VISIT (OUTPATIENT)
Age: 65
End: 2025-03-25

## 2025-03-25 VITALS
OXYGEN SATURATION: 98 % | DIASTOLIC BLOOD PRESSURE: 78 MMHG | TEMPERATURE: 98.2 F | SYSTOLIC BLOOD PRESSURE: 112 MMHG | HEART RATE: 81 BPM

## 2025-03-25 DIAGNOSIS — H66.92 LEFT ACUTE OTITIS MEDIA: Primary | ICD-10-CM

## 2025-03-25 DIAGNOSIS — L03.031 CELLULITIS OF TOE OF RIGHT FOOT: ICD-10-CM

## 2025-03-25 DIAGNOSIS — J02.9 SORE THROAT: ICD-10-CM

## 2025-03-25 LAB — S PYO AG THROAT QL: NORMAL

## 2025-03-25 RX ORDER — CEPHALEXIN 500 MG/1
500 CAPSULE ORAL 2 TIMES DAILY
Qty: 20 CAPSULE | Refills: 0 | Status: SHIPPED | OUTPATIENT
Start: 2025-03-25 | End: 2025-04-04

## 2025-03-25 ASSESSMENT — ENCOUNTER SYMPTOMS: SORE THROAT: 1

## 2025-03-25 NOTE — PATIENT INSTRUCTIONS
Deandra,    Thank you for trusting [facility] with your care. Your decision to come to us means a lot and we are honored to be part of your healthcare journey. We value your trust and hope your experience with us was positive and met your expectations.    We're always looking for ways to improve, and your feedback is incredibly important to us. You will receive a text or email soon asking you how your visit went. for If you could take a moment to share your thoughts, it would mean the world to us. Your input helps us better serve you and others in the community.     Thank you again for choosing us. We're grateful for the opportunity to care for you and your loved ones. We hope to see you again - though we always wish you health and wellness!    Warm regards,    [facility] Team    Yudy Culp PSR/MA, Kylah Butler RT, Marysol Arceo NP-C, and El Us MA

## 2025-03-25 NOTE — PROGRESS NOTES
Deandra Whipple (: 1960) is a 64 y.o. female, New patient, here for evaluation of the following chief complaint(s):  Pharyngitis (Started 7 days ago ) and Toe Pain (Infected right toe, started about 7 days ago)      ASSESSMENT/PLAN:    ICD-10-CM    1. Left acute otitis media  H66.92 cephALEXin (KEFLEX) 500 MG capsule      2. Cellulitis of toe of right foot  L03.031 cephALEXin (KEFLEX) 500 MG capsule      3. Sore throat  J02.9 POCT rapid strep A        - Otitis Media of the Left Ear:   Due to exam concerning for a bulging, erythematous TM, with purulent appearing effusion, concern for otitis media noted of the left ear complicating an underlying viral URI.   Low concern for otitis externa, TM perforation, cerumen impaction, foreign body within the ear canal, mastoiditis, respiratory distress, pneumonia, bronchitis, and PE.  Cephalexin is prescribed for antibiotic treatment of the ear infection  Recommended OTC medication and home remedy treatments for symptomatic relief  Strict ED follow up instructions provided.  -Cellulitis of right great toe:  Cephalexin is prescribed for antibiotic treatment of the skin infection  Take the antibiotics until completed, do not stop unless otherwise directed by a healthcare provider.  Recommend daily yogurt or other probiotics while on antibiotics.  Use hot compresses/warm soaks several times per day over the site, as well as any wound care.  Ibuprofen and/or acetaminophen for pain/swelling.  If swelling persists despite antibiotic treatment, to return to the clinic for further evaluation.  If fevers, body aches, nausea, vomiting, or shivering develop, or if swelling continues to expand, follow up with the ED for further evaluation.    New Prescriptions    CEPHALEXIN (KEFLEX) 500 MG CAPSULE    Take 1 capsule by mouth 2 times daily for 10 days       Discussed PCP follow up for persisting or worsening symptoms, or to return to the clinic if unable to obtain PCP follow up for

## 2025-08-02 ENCOUNTER — OFFICE VISIT (OUTPATIENT)
Age: 65
End: 2025-08-02

## 2025-08-02 VITALS
HEART RATE: 71 BPM | SYSTOLIC BLOOD PRESSURE: 110 MMHG | BODY MASS INDEX: 30.96 KG/M2 | DIASTOLIC BLOOD PRESSURE: 62 MMHG | TEMPERATURE: 97.1 F | OXYGEN SATURATION: 98 % | HEIGHT: 61 IN | WEIGHT: 164 LBS

## 2025-08-02 DIAGNOSIS — L03.011: Primary | ICD-10-CM

## 2025-08-02 RX ORDER — CEPHALEXIN 500 MG/1
500 CAPSULE ORAL 2 TIMES DAILY
Qty: 14 CAPSULE | Refills: 0 | Status: SHIPPED | OUTPATIENT
Start: 2025-08-02 | End: 2025-08-09

## 2025-08-02 ASSESSMENT — ENCOUNTER SYMPTOMS: COLOR CHANGE: 1

## 2025-08-02 NOTE — PROGRESS NOTES
Deandra Whipple (: 1960) is a 65 y.o. female, Established patient, here for evaluation of the following chief complaint(s):  Nail Problem (Right hand, middle fingernail-started last week)      ASSESSMENT/PLAN:    ICD-10-CM    1. Cellulitis of fingernail, right  L03.011 cephALEXin (KEFLEX) 500 MG capsule        Cephalexin is prescribed for antibiotic treatment of the skin infection  Take the antibiotics until completed, do not stop unless otherwise directed by a healthcare provider.  Recommend daily yogurt or other probiotics while on antibiotics.  Use hot compresses/warm soaks several times per day over the site, as well as any wound care.  Ibuprofen and/or acetaminophen for pain/swelling.  If swelling persists despite antibiotic treatment, to return to the clinic for further evaluation.  If fevers, body aches, nausea, vomiting, or shivering develop, or if swelling continues to expand, follow up with the ED for further evaluation.  Discussed PCP follow up for persisting or worsening symptoms, or to return to the clinic if unable to obtain PCP follow up for worsening symptoms.  The patient tolerated their visit well. A time was given to answer questions and a plan was established, proposed, and was agreed upon. Reviewed AVS with treatment instructions and answered questions - patient acknowledges understanding and agreement with the discussed treatment plan and AVS instructions.              SUBJECTIVE/OBJECTIVE:  HPI:   65 y.o. female presents for complaint of infected fingernail x 1 week.    Admits first noticed pain and swelling on right middle fingernail one week ago. A few days ago, it started oozing green drainage. Very painful to touch and warm to touch.   Denies fever, nausea, vomiting, chills, body aches    Nothing makes symptoms better.  Touching finger makes symptoms worse.    Has attempted neosporin, bandages for symptoms     Pt provided HPI by themself - pt considered to be a reliable

## 2025-08-02 NOTE — PATIENT INSTRUCTIONS
Cephalexin is prescribed for antibiotic treatment of the skin infection  Take the antibiotics until completed, do not stop unless otherwise directed by a healthcare provider.  Recommend daily yogurt or other probiotics while on antibiotics.  Use hot compresses/warm soaks several times per day over the site, as well as any wound care.  Ibuprofen and/or acetaminophen for pain/swelling.  If swelling persists despite antibiotic treatment, to return to the clinic for further evaluation.  If fevers, body aches, nausea, vomiting, or shivering develop, or if swelling continues to expand, follow up with the ED for further evaluation.    New Prescriptions    CEPHALEXIN (KEFLEX) 500 MG CAPSULE    Take 1 capsule by mouth 2 times daily for 7 days

## 2025-08-11 ENCOUNTER — TELEPHONE (OUTPATIENT)
Age: 65
End: 2025-08-11

## 2025-08-11 RX ORDER — SULFAMETHOXAZOLE AND TRIMETHOPRIM 800; 160 MG/1; MG/1
1 TABLET ORAL 2 TIMES DAILY
Qty: 20 TABLET | Refills: 0 | Status: SHIPPED | OUTPATIENT
Start: 2025-08-11 | End: 2025-08-21

## (undated) DEVICE — ELECTRODE ECG MONITR FOAM TEAR DROP ADLT RED

## (undated) DEVICE — CONMED SCOPE SAVER BITE BLOCK, 20X27 MM: Brand: SCOPE SAVER

## (undated) DEVICE — ENDO CARRY-ON PROCEDURE KIT INCLUDES SUCTION TUBING, LUBRICANT, GAUZE, BIOHAZARD STICKER, TRANSPORT PAD AND INTERCEPT BEDSIDE KIT.: Brand: ENDO CARRY-ON PROCEDURE KIT